# Patient Record
Sex: FEMALE | Race: WHITE | Employment: FULL TIME | URBAN - NONMETROPOLITAN AREA
[De-identification: names, ages, dates, MRNs, and addresses within clinical notes are randomized per-mention and may not be internally consistent; named-entity substitution may affect disease eponyms.]

---

## 2017-01-19 ENCOUNTER — TELEPHONE (OUTPATIENT)
Dept: FAMILY MEDICINE CLINIC | Age: 32
End: 2017-01-19

## 2017-02-01 ENCOUNTER — OFFICE VISIT (OUTPATIENT)
Dept: FAMILY MEDICINE CLINIC | Age: 32
End: 2017-02-01

## 2017-02-01 VITALS
TEMPERATURE: 98.2 F | HEART RATE: 80 BPM | BODY MASS INDEX: 21.9 KG/M2 | DIASTOLIC BLOOD PRESSURE: 61 MMHG | SYSTOLIC BLOOD PRESSURE: 128 MMHG | WEIGHT: 116 LBS | HEIGHT: 61 IN | RESPIRATION RATE: 12 BRPM

## 2017-02-01 DIAGNOSIS — J45.50 UNCOMPLICATED SEVERE PERSISTENT ASTHMA: Primary | ICD-10-CM

## 2017-02-01 DIAGNOSIS — F41.1 GAD (GENERALIZED ANXIETY DISORDER): ICD-10-CM

## 2017-02-01 PROCEDURE — 99214 OFFICE O/P EST MOD 30 MIN: CPT | Performed by: NURSE PRACTITIONER

## 2017-02-01 RX ORDER — BUDESONIDE AND FORMOTEROL FUMARATE DIHYDRATE 160; 4.5 UG/1; UG/1
2 AEROSOL RESPIRATORY (INHALATION) 2 TIMES DAILY
Qty: 1 INHALER | Refills: 3 | Status: SHIPPED | OUTPATIENT
Start: 2017-02-01 | End: 2017-07-13 | Stop reason: SDUPTHER

## 2017-02-01 RX ORDER — ESCITALOPRAM OXALATE 10 MG/1
10 TABLET ORAL DAILY
Qty: 90 TABLET | Refills: 3 | Status: SHIPPED | OUTPATIENT
Start: 2017-02-01 | End: 2017-10-24 | Stop reason: SDUPTHER

## 2017-02-16 ENCOUNTER — TELEPHONE (OUTPATIENT)
Dept: FAMILY MEDICINE CLINIC | Age: 32
End: 2017-02-16

## 2017-02-17 ENCOUNTER — OFFICE VISIT (OUTPATIENT)
Dept: PULMONOLOGY | Age: 32
End: 2017-02-17

## 2017-02-17 VITALS
HEIGHT: 61 IN | TEMPERATURE: 97.6 F | WEIGHT: 116.2 LBS | DIASTOLIC BLOOD PRESSURE: 76 MMHG | SYSTOLIC BLOOD PRESSURE: 110 MMHG | BODY MASS INDEX: 21.94 KG/M2 | HEART RATE: 87 BPM | OXYGEN SATURATION: 98 %

## 2017-02-17 DIAGNOSIS — K21.9 GASTROESOPHAGEAL REFLUX DISEASE WITHOUT ESOPHAGITIS: ICD-10-CM

## 2017-02-17 DIAGNOSIS — F17.218 CIGARETTE NICOTINE DEPENDENCE WITH OTHER NICOTINE-INDUCED DISORDER: ICD-10-CM

## 2017-02-17 DIAGNOSIS — R05.9 COUGH: Primary | ICD-10-CM

## 2017-02-17 DIAGNOSIS — J44.9 CHRONIC OBSTRUCTIVE PULMONARY DISEASE, UNSPECIFIED COPD TYPE (HCC): ICD-10-CM

## 2017-02-17 PROCEDURE — 99204 OFFICE O/P NEW MOD 45 MIN: CPT | Performed by: INTERNAL MEDICINE

## 2017-02-17 RX ORDER — PANTOPRAZOLE SODIUM 40 MG/1
40 TABLET, DELAYED RELEASE ORAL DAILY
Qty: 30 TABLET | Refills: 3 | Status: SHIPPED | OUTPATIENT
Start: 2017-02-17 | End: 2017-07-13 | Stop reason: SDUPTHER

## 2017-02-17 RX ORDER — VARENICLINE TARTRATE 0.5 MG/1
0.5 TABLET, FILM COATED ORAL 2 TIMES DAILY
Qty: 60 TABLET | Refills: 3 | Status: SHIPPED | OUTPATIENT
Start: 2017-02-17 | End: 2017-08-15 | Stop reason: ALTCHOICE

## 2017-02-17 ASSESSMENT — ENCOUNTER SYMPTOMS
CHOKING: 0
WHEEZING: 0
SHORTNESS OF BREATH: 1
RHINORRHEA: 0
CONSTIPATION: 0
PHOTOPHOBIA: 0
VOMITING: 0
FACIAL SWELLING: 0
BACK PAIN: 0
VOICE CHANGE: 0
NAUSEA: 0
SINUS PRESSURE: 0
STRIDOR: 0
ABDOMINAL DISTENTION: 0
APNEA: 0
CHEST TIGHTNESS: 0
ABDOMINAL PAIN: 0
COUGH: 1
BLOOD IN STOOL: 0

## 2017-03-23 LAB
IGE: 111 KU/L
Lab: NORMAL

## 2017-05-12 ENCOUNTER — OFFICE VISIT (OUTPATIENT)
Dept: PULMONOLOGY | Age: 32
End: 2017-05-12

## 2017-05-12 VITALS
OXYGEN SATURATION: 97 % | WEIGHT: 113 LBS | TEMPERATURE: 98.2 F | SYSTOLIC BLOOD PRESSURE: 121 MMHG | HEIGHT: 60 IN | HEART RATE: 90 BPM | DIASTOLIC BLOOD PRESSURE: 76 MMHG | BODY MASS INDEX: 22.19 KG/M2 | RESPIRATION RATE: 12 BRPM

## 2017-05-12 DIAGNOSIS — J45.20 MILD INTERMITTENT ASTHMA WITHOUT COMPLICATION: Primary | ICD-10-CM

## 2017-05-12 PROBLEM — J45.909 ASTHMA: Status: ACTIVE | Noted: 2017-05-12

## 2017-05-12 PROCEDURE — 99213 OFFICE O/P EST LOW 20 MIN: CPT | Performed by: INTERNAL MEDICINE

## 2017-05-12 RX ORDER — ALBUTEROL SULFATE 90 UG/1
2 AEROSOL, METERED RESPIRATORY (INHALATION) EVERY 6 HOURS PRN
Qty: 1 INHALER | Refills: 3 | Status: SHIPPED | OUTPATIENT
Start: 2017-05-12 | End: 2018-04-30 | Stop reason: SDUPTHER

## 2017-07-13 DIAGNOSIS — J45.50 UNCOMPLICATED SEVERE PERSISTENT ASTHMA: ICD-10-CM

## 2017-07-13 DIAGNOSIS — R05.9 COUGH: ICD-10-CM

## 2017-07-13 RX ORDER — BUDESONIDE AND FORMOTEROL FUMARATE DIHYDRATE 160; 4.5 UG/1; UG/1
2 AEROSOL RESPIRATORY (INHALATION) 2 TIMES DAILY
Qty: 1 INHALER | Refills: 3 | Status: SHIPPED | OUTPATIENT
Start: 2017-07-13 | End: 2017-10-30 | Stop reason: SDUPTHER

## 2017-07-13 RX ORDER — PANTOPRAZOLE SODIUM 40 MG/1
40 TABLET, DELAYED RELEASE ORAL DAILY
Qty: 30 TABLET | Refills: 3 | Status: SHIPPED | OUTPATIENT
Start: 2017-07-13 | End: 2017-10-24 | Stop reason: SDUPTHER

## 2017-08-15 ENCOUNTER — OFFICE VISIT (OUTPATIENT)
Dept: FAMILY MEDICINE CLINIC | Age: 32
End: 2017-08-15
Payer: COMMERCIAL

## 2017-08-15 VITALS
SYSTOLIC BLOOD PRESSURE: 118 MMHG | DIASTOLIC BLOOD PRESSURE: 78 MMHG | HEART RATE: 68 BPM | TEMPERATURE: 98.7 F | HEIGHT: 61 IN | WEIGHT: 115.8 LBS | RESPIRATION RATE: 16 BRPM | BODY MASS INDEX: 21.86 KG/M2

## 2017-08-15 DIAGNOSIS — R10.10 UPPER ABDOMINAL PAIN: Primary | ICD-10-CM

## 2017-08-15 DIAGNOSIS — R19.00 ABDOMINAL WALL BULGE: ICD-10-CM

## 2017-08-15 DIAGNOSIS — R11.15 NON-INTRACTABLE CYCLICAL VOMITING WITH NAUSEA: ICD-10-CM

## 2017-08-15 PROCEDURE — 99214 OFFICE O/P EST MOD 30 MIN: CPT | Performed by: NURSE PRACTITIONER

## 2017-08-15 RX ORDER — ONDANSETRON 4 MG/1
4 TABLET, FILM COATED ORAL EVERY 8 HOURS PRN
Qty: 30 TABLET | Refills: 0 | Status: SHIPPED | OUTPATIENT
Start: 2017-08-15 | End: 2017-08-31 | Stop reason: SDUPTHER

## 2017-08-21 ENCOUNTER — TELEPHONE (OUTPATIENT)
Dept: FAMILY MEDICINE CLINIC | Age: 32
End: 2017-08-21

## 2017-08-21 ENCOUNTER — HOSPITAL ENCOUNTER (OUTPATIENT)
Dept: CT IMAGING | Age: 32
Discharge: HOME | End: 2017-08-21
Payer: COMMERCIAL

## 2017-08-21 DIAGNOSIS — R10.13 EPIGASTRIC ABDOMINAL PAIN: Primary | ICD-10-CM

## 2017-08-21 DIAGNOSIS — R11.15 NON-INTRACTABLE CYCLICAL VOMITING WITH NAUSEA: ICD-10-CM

## 2017-08-21 DIAGNOSIS — R10.10 UPPER ABDOMINAL PAIN: ICD-10-CM

## 2017-08-21 DIAGNOSIS — R19.00 ABDOMINAL WALL BULGE: ICD-10-CM

## 2017-08-21 DIAGNOSIS — K21.9 GASTROESOPHAGEAL REFLUX DISEASE, ESOPHAGITIS PRESENCE NOT SPECIFIED: ICD-10-CM

## 2017-08-21 PROCEDURE — 6360000004 HC RX CONTRAST MEDICATION: Performed by: NURSE PRACTITIONER

## 2017-08-21 PROCEDURE — 74177 CT ABD & PELVIS W/CONTRAST: CPT

## 2017-08-21 RX ADMIN — IOPAMIDOL 85 ML: 755 INJECTION, SOLUTION INTRAVENOUS at 07:29

## 2017-08-21 RX ADMIN — IOHEXOL 50 ML: 240 INJECTION, SOLUTION INTRATHECAL; INTRAVASCULAR; INTRAVENOUS; ORAL at 07:30

## 2017-08-31 DIAGNOSIS — R11.15 NON-INTRACTABLE CYCLICAL VOMITING WITH NAUSEA: ICD-10-CM

## 2017-08-31 RX ORDER — ONDANSETRON 4 MG/1
4 TABLET, FILM COATED ORAL EVERY 8 HOURS PRN
Qty: 30 TABLET | Refills: 0 | Status: SHIPPED | OUTPATIENT
Start: 2017-08-31 | End: 2019-03-26 | Stop reason: ALTCHOICE

## 2017-09-18 ENCOUNTER — OFFICE VISIT (OUTPATIENT)
Dept: FAMILY MEDICINE CLINIC | Age: 32
End: 2017-09-18
Payer: COMMERCIAL

## 2017-09-18 ENCOUNTER — TELEPHONE (OUTPATIENT)
Dept: FAMILY MEDICINE CLINIC | Age: 32
End: 2017-09-18

## 2017-09-18 VITALS
WEIGHT: 114 LBS | TEMPERATURE: 99.6 F | DIASTOLIC BLOOD PRESSURE: 82 MMHG | HEIGHT: 60 IN | SYSTOLIC BLOOD PRESSURE: 110 MMHG | BODY MASS INDEX: 22.38 KG/M2 | HEART RATE: 122 BPM

## 2017-09-18 DIAGNOSIS — R50.9 FEVER IN ADULT: Primary | ICD-10-CM

## 2017-09-18 DIAGNOSIS — R52 BODY ACHES: ICD-10-CM

## 2017-09-18 DIAGNOSIS — J40 BRONCHITIS: ICD-10-CM

## 2017-09-18 LAB
INFLUENZA A ANTIBODY: NORMAL
INFLUENZA B ANTIBODY: NORMAL

## 2017-09-18 PROCEDURE — 99213 OFFICE O/P EST LOW 20 MIN: CPT | Performed by: NURSE PRACTITIONER

## 2017-09-18 PROCEDURE — 87804 INFLUENZA ASSAY W/OPTIC: CPT | Performed by: NURSE PRACTITIONER

## 2017-09-18 RX ORDER — DOXYCYCLINE HYCLATE 100 MG
100 TABLET ORAL 2 TIMES DAILY
Qty: 20 TABLET | Refills: 0 | Status: SHIPPED | OUTPATIENT
Start: 2017-09-18 | End: 2017-09-28

## 2017-09-19 ENCOUNTER — TELEPHONE (OUTPATIENT)
Dept: FAMILY MEDICINE CLINIC | Age: 32
End: 2017-09-19

## 2017-09-21 ENCOUNTER — TELEPHONE (OUTPATIENT)
Dept: FAMILY MEDICINE CLINIC | Age: 32
End: 2017-09-21

## 2017-09-21 NOTE — TELEPHONE ENCOUNTER
Patient called and is not feeling much better. Very tired, temp low grade , which is better, still coughing but not coughing anything up. Any instructions?

## 2017-09-22 ENCOUNTER — OFFICE VISIT (OUTPATIENT)
Dept: FAMILY MEDICINE CLINIC | Age: 32
End: 2017-09-22
Payer: COMMERCIAL

## 2017-09-22 VITALS
TEMPERATURE: 98.2 F | SYSTOLIC BLOOD PRESSURE: 106 MMHG | BODY MASS INDEX: 20.43 KG/M2 | HEART RATE: 80 BPM | DIASTOLIC BLOOD PRESSURE: 74 MMHG | WEIGHT: 111 LBS | RESPIRATION RATE: 14 BRPM | HEIGHT: 62 IN

## 2017-09-22 DIAGNOSIS — F41.1 GAD (GENERALIZED ANXIETY DISORDER): ICD-10-CM

## 2017-09-22 DIAGNOSIS — J30.1 SEASONAL ALLERGIC RHINITIS DUE TO POLLEN: ICD-10-CM

## 2017-09-22 DIAGNOSIS — J18.9 LINGULAR PNEUMONIA: Primary | ICD-10-CM

## 2017-09-22 PROCEDURE — 99214 OFFICE O/P EST MOD 30 MIN: CPT | Performed by: NURSE PRACTITIONER

## 2017-09-22 RX ORDER — GUAIFENESIN AND CODEINE PHOSPHATE 100; 10 MG/5ML; MG/5ML
5 SOLUTION ORAL 4 TIMES DAILY PRN
Qty: 120 ML | Refills: 0 | Status: SHIPPED | OUTPATIENT
Start: 2017-09-22 | End: 2017-09-29

## 2017-09-22 RX ORDER — FEXOFENADINE HCL AND PSEUDOEPHEDRINE HCI 180; 240 MG/1; MG/1
1 TABLET, EXTENDED RELEASE ORAL DAILY
Qty: 30 TABLET | Refills: 5 | Status: SHIPPED | OUTPATIENT
Start: 2017-09-22 | End: 2018-04-30 | Stop reason: SDUPTHER

## 2017-09-26 ENCOUNTER — TELEPHONE (OUTPATIENT)
Dept: FAMILY MEDICINE CLINIC | Age: 32
End: 2017-09-26

## 2017-09-26 DIAGNOSIS — M62.838 NECK MUSCLE SPASM: Primary | ICD-10-CM

## 2017-09-26 RX ORDER — DIAZEPAM 2 MG/1
2 TABLET ORAL EVERY 8 HOURS PRN
Qty: 21 TABLET | Refills: 0 | Status: SHIPPED | OUTPATIENT
Start: 2017-09-26 | End: 2017-10-24 | Stop reason: ALTCHOICE

## 2017-09-26 RX ORDER — TIZANIDINE 4 MG/1
4 TABLET ORAL EVERY 8 HOURS PRN
Qty: 30 TABLET | Refills: 3 | Status: SHIPPED | OUTPATIENT
Start: 2017-09-26 | End: 2017-10-24 | Stop reason: ALTCHOICE

## 2017-10-18 ENCOUNTER — PATIENT MESSAGE (OUTPATIENT)
Dept: FAMILY MEDICINE CLINIC | Age: 32
End: 2017-10-18

## 2017-10-24 ENCOUNTER — OFFICE VISIT (OUTPATIENT)
Dept: FAMILY MEDICINE CLINIC | Age: 32
End: 2017-10-24
Payer: COMMERCIAL

## 2017-10-24 VITALS
RESPIRATION RATE: 12 BRPM | SYSTOLIC BLOOD PRESSURE: 104 MMHG | HEART RATE: 88 BPM | WEIGHT: 112 LBS | HEIGHT: 61 IN | TEMPERATURE: 98.6 F | DIASTOLIC BLOOD PRESSURE: 72 MMHG | BODY MASS INDEX: 21.14 KG/M2

## 2017-10-24 DIAGNOSIS — K21.9 GASTROESOPHAGEAL REFLUX DISEASE, ESOPHAGITIS PRESENCE NOT SPECIFIED: Primary | ICD-10-CM

## 2017-10-24 DIAGNOSIS — F41.1 GAD (GENERALIZED ANXIETY DISORDER): ICD-10-CM

## 2017-10-24 DIAGNOSIS — Z23 NEED FOR INFLUENZA VACCINATION: ICD-10-CM

## 2017-10-24 DIAGNOSIS — Z23 NEED FOR PNEUMOCOCCAL VACCINATION: ICD-10-CM

## 2017-10-24 PROCEDURE — 99214 OFFICE O/P EST MOD 30 MIN: CPT | Performed by: NURSE PRACTITIONER

## 2017-10-24 PROCEDURE — G8420 CALC BMI NORM PARAMETERS: HCPCS | Performed by: NURSE PRACTITIONER

## 2017-10-24 PROCEDURE — 90688 IIV4 VACCINE SPLT 0.5 ML IM: CPT | Performed by: NURSE PRACTITIONER

## 2017-10-24 PROCEDURE — 90471 IMMUNIZATION ADMIN: CPT | Performed by: NURSE PRACTITIONER

## 2017-10-24 PROCEDURE — G8484 FLU IMMUNIZE NO ADMIN: HCPCS | Performed by: NURSE PRACTITIONER

## 2017-10-24 PROCEDURE — 90472 IMMUNIZATION ADMIN EACH ADD: CPT | Performed by: NURSE PRACTITIONER

## 2017-10-24 PROCEDURE — 90732 PPSV23 VACC 2 YRS+ SUBQ/IM: CPT | Performed by: NURSE PRACTITIONER

## 2017-10-24 PROCEDURE — 1036F TOBACCO NON-USER: CPT | Performed by: NURSE PRACTITIONER

## 2017-10-24 PROCEDURE — G8427 DOCREV CUR MEDS BY ELIG CLIN: HCPCS | Performed by: NURSE PRACTITIONER

## 2017-10-24 RX ORDER — ESCITALOPRAM OXALATE 10 MG/1
10 TABLET ORAL DAILY
Qty: 90 TABLET | Refills: 3 | Status: SHIPPED | OUTPATIENT
Start: 2017-10-24 | End: 2018-04-30 | Stop reason: SDUPTHER

## 2017-10-24 RX ORDER — PANTOPRAZOLE SODIUM 40 MG/1
40 TABLET, DELAYED RELEASE ORAL DAILY
Qty: 30 TABLET | Refills: 3 | Status: SHIPPED | OUTPATIENT
Start: 2017-10-24 | End: 2017-10-24 | Stop reason: ALTCHOICE

## 2017-10-24 RX ORDER — PANTOPRAZOLE SODIUM 40 MG/1
40 TABLET, DELAYED RELEASE ORAL DAILY
Qty: 90 TABLET | Refills: 3 | Status: SHIPPED | OUTPATIENT
Start: 2017-10-24 | End: 2018-04-30 | Stop reason: SDUPTHER

## 2017-10-24 RX ORDER — CLONAZEPAM 0.5 MG/1
0.5 TABLET ORAL 2 TIMES DAILY PRN
Qty: 60 TABLET | Refills: 0 | Status: SHIPPED | OUTPATIENT
Start: 2017-10-24 | End: 2018-03-14 | Stop reason: SDUPTHER

## 2017-10-24 NOTE — PROGRESS NOTES
After obtaining consent, and per orders of Elly Foster CNP , injection of  Flu Vaccine . 5 ml and Pneumovax 23 .5 ml given in the right deltoid by Christi Ruiz. Patient instructed to report any adverse reaction to me immediately. Most recent Vaccine Information sheet dated 8/7/15 and 4/24/15 given to patient.
Visit Information    Have you changed or started any medications since your last visit including any over-the-counter medicines, vitamins, or herbal medicines? no   Are you having any side effects from any of your medications? -  no  Have you stopped taking any of your medications? Is so, why? -  no    Have you seen any other physician or provider since your last visit? Yes - Records Obtained  Have you had any other diagnostic tests since your last visit? Yes - Records Obtained  Have you been seen in the emergency room and/or had an admission to a hospital since we last saw you? Yes - Records Obtained  Have you had your routine dental cleaning in the past 6 months? yes    Have you activated your [employer] account? If not, what are your barriers? Yes     Patient Care Team:  Kellen Oscar CNP as PCP - General (Family Medicine)    Medical History Review  Past Medical, Family, and Social History     Defer to provider.
abuse or diversion identified. Sebastian Carmen CNP)    DISPOSITION    Return if symptoms worsen or fail to improve. Anna Yousif released without restrictions. PATIENT COUNSELING    Counseling was provided today regarding the following topics: Healthy eating habits, Regular exercise, substance abuse and healthy sleep habits. Anna Yousif received counseling on the following healthy behaviors: tobacco cessation    Patient given educational materials on: See Attached    I have instructed Anna Yousif to complete a self tracking handout on none and instructed them to bring it with them to her next appointment. Barriers to learning and self management: none    Discussed use, benefit, and side effects of prescribed medications. Barriers to medication compliance addressed. All patient questions answered. Pt voiced understanding.        Electronically signed by Sebastian Carmen CNP on 10/24/2017 at 12:01 PM

## 2017-10-30 DIAGNOSIS — J45.50 UNCOMPLICATED SEVERE PERSISTENT ASTHMA: ICD-10-CM

## 2017-10-30 RX ORDER — BUDESONIDE AND FORMOTEROL FUMARATE DIHYDRATE 160; 4.5 UG/1; UG/1
2 AEROSOL RESPIRATORY (INHALATION) 2 TIMES DAILY
Qty: 1 INHALER | Refills: 1 | Status: SHIPPED | OUTPATIENT
Start: 2017-10-30 | End: 2017-12-13 | Stop reason: SDUPTHER

## 2017-12-12 DIAGNOSIS — J44.9 CHRONIC OBSTRUCTIVE PULMONARY DISEASE, UNSPECIFIED COPD TYPE (HCC): ICD-10-CM

## 2017-12-12 RX ORDER — IPRATROPIUM BROMIDE AND ALBUTEROL SULFATE 2.5; .5 MG/3ML; MG/3ML
1 SOLUTION RESPIRATORY (INHALATION) EVERY 4 HOURS PRN
Qty: 360 ML | Refills: 3 | Status: SHIPPED | OUTPATIENT
Start: 2017-12-12 | End: 2018-03-15 | Stop reason: SDUPTHER

## 2017-12-12 NOTE — TELEPHONE ENCOUNTER
From: Devante Wu  Sent: 12/12/2017 10:28 AM EST  Subject: Medication Renewal Request    Lucy Caruso.  Conradocailinamaury would like a refill of the following medications:  ipratropium-albuterol (DUONEB) 0.5-2.5 (3) MG/3ML SOLN nebulizer solution Mikie Ly CNP]    Preferred pharmacy: [address] [place] [address], [address] [place] [address] [phone] - f [phone]    Comment:      Medication renewals requested in this message routed separately:  budesonide-formoterol (SYMBICORT) 160-4.5 MCG/ACT KEVINO Roddy Sharif MD]

## 2017-12-13 DIAGNOSIS — J45.50 UNCOMPLICATED SEVERE PERSISTENT ASTHMA: ICD-10-CM

## 2017-12-14 RX ORDER — BUDESONIDE AND FORMOTEROL FUMARATE DIHYDRATE 160; 4.5 UG/1; UG/1
2 AEROSOL RESPIRATORY (INHALATION) 2 TIMES DAILY
Qty: 1 INHALER | Refills: 1 | Status: SHIPPED | OUTPATIENT
Start: 2017-12-14 | End: 2018-03-15 | Stop reason: SDUPTHER

## 2018-03-14 DIAGNOSIS — F41.1 GAD (GENERALIZED ANXIETY DISORDER): ICD-10-CM

## 2018-03-14 RX ORDER — CLONAZEPAM 0.5 MG/1
0.5 TABLET ORAL 2 TIMES DAILY PRN
Qty: 60 TABLET | Refills: 0 | Status: SHIPPED | OUTPATIENT
Start: 2018-03-14 | End: 2018-09-18 | Stop reason: SDUPTHER

## 2018-03-14 NOTE — TELEPHONE ENCOUNTER
From: Vikas Esqueda  Sent: 3/14/2018 12:00 PM EDT  Subject: Medication Renewal Request    Yeni Patel would like a refill of the following medications:     clonazePAM (KLONOPIN) 0.5 MG tablet Hue Esquivel CNP]    Preferred pharmacy: [address] [place] [address] [phone] - F [phone]    Comment:      Medication renewals requested in this message routed separately:     budesonide-formoterol (SYMBICORT) 160-4.5 MCG/ACT KEVINO Nelly Olguin MD]

## 2018-03-15 DIAGNOSIS — J45.50 UNCOMPLICATED SEVERE PERSISTENT ASTHMA: ICD-10-CM

## 2018-03-15 DIAGNOSIS — R05.9 COUGH: ICD-10-CM

## 2018-03-15 DIAGNOSIS — J44.9 CHRONIC OBSTRUCTIVE PULMONARY DISEASE, UNSPECIFIED COPD TYPE (HCC): ICD-10-CM

## 2018-03-15 RX ORDER — IPRATROPIUM BROMIDE AND ALBUTEROL SULFATE 2.5; .5 MG/3ML; MG/3ML
1 SOLUTION RESPIRATORY (INHALATION) EVERY 4 HOURS PRN
Qty: 360 ML | Refills: 3 | Status: SHIPPED | OUTPATIENT
Start: 2018-03-15 | End: 2019-06-30 | Stop reason: SDUPTHER

## 2018-03-20 RX ORDER — BUDESONIDE AND FORMOTEROL FUMARATE DIHYDRATE 160; 4.5 UG/1; UG/1
2 AEROSOL RESPIRATORY (INHALATION) 2 TIMES DAILY
Qty: 1 INHALER | Refills: 0 | Status: SHIPPED | OUTPATIENT
Start: 2018-03-20 | End: 2018-04-30 | Stop reason: SDUPTHER

## 2018-04-30 DIAGNOSIS — J45.20 MILD INTERMITTENT ASTHMA WITHOUT COMPLICATION: ICD-10-CM

## 2018-04-30 DIAGNOSIS — K21.9 GASTROESOPHAGEAL REFLUX DISEASE, ESOPHAGITIS PRESENCE NOT SPECIFIED: ICD-10-CM

## 2018-04-30 DIAGNOSIS — J30.1 SEASONAL ALLERGIC RHINITIS DUE TO POLLEN: ICD-10-CM

## 2018-04-30 DIAGNOSIS — J45.50 UNCOMPLICATED SEVERE PERSISTENT ASTHMA: ICD-10-CM

## 2018-04-30 DIAGNOSIS — F41.1 GAD (GENERALIZED ANXIETY DISORDER): ICD-10-CM

## 2018-04-30 RX ORDER — BUDESONIDE AND FORMOTEROL FUMARATE DIHYDRATE 160; 4.5 UG/1; UG/1
2 AEROSOL RESPIRATORY (INHALATION) 2 TIMES DAILY
Qty: 1 INHALER | Refills: 0 | Status: SHIPPED | OUTPATIENT
Start: 2018-04-30 | End: 2019-03-26

## 2018-04-30 RX ORDER — ALBUTEROL SULFATE 90 UG/1
2 AEROSOL, METERED RESPIRATORY (INHALATION) EVERY 6 HOURS PRN
Qty: 1 INHALER | Refills: 2 | Status: SHIPPED | OUTPATIENT
Start: 2018-04-30 | End: 2019-07-03 | Stop reason: SDUPTHER

## 2018-04-30 RX ORDER — FEXOFENADINE HCL AND PSEUDOEPHEDRINE HCI 180; 240 MG/1; MG/1
1 TABLET, EXTENDED RELEASE ORAL DAILY
Qty: 30 TABLET | Refills: 5 | Status: SHIPPED | OUTPATIENT
Start: 2018-04-30 | End: 2019-06-30 | Stop reason: SDUPTHER

## 2018-04-30 RX ORDER — ESCITALOPRAM OXALATE 10 MG/1
10 TABLET ORAL DAILY
Qty: 90 TABLET | Refills: 3 | Status: SHIPPED | OUTPATIENT
Start: 2018-04-30 | End: 2019-03-26 | Stop reason: SDUPTHER

## 2018-04-30 RX ORDER — PANTOPRAZOLE SODIUM 40 MG/1
40 TABLET, DELAYED RELEASE ORAL DAILY
Qty: 90 TABLET | Refills: 3 | Status: SHIPPED | OUTPATIENT
Start: 2018-04-30 | End: 2019-03-26 | Stop reason: SDUPTHER

## 2018-06-29 ENCOUNTER — TELEPHONE (OUTPATIENT)
Dept: FAMILY MEDICINE CLINIC | Age: 33
End: 2018-06-29

## 2018-08-01 ENCOUNTER — HOSPITAL ENCOUNTER (OUTPATIENT)
Dept: CT IMAGING | Age: 33
Discharge: HOME | End: 2018-08-01
Payer: COMMERCIAL

## 2018-08-01 DIAGNOSIS — S42.021A CLOSED DISPLACED FRACTURE OF SHAFT OF RIGHT CLAVICLE, INITIAL ENCOUNTER: ICD-10-CM

## 2018-08-01 PROCEDURE — 73200 CT UPPER EXTREMITY W/O DYE: CPT

## 2018-09-10 ENCOUNTER — OFFICE VISIT (OUTPATIENT)
Dept: FAMILY MEDICINE CLINIC | Age: 33
End: 2018-09-10
Payer: COMMERCIAL

## 2018-09-10 VITALS
BODY MASS INDEX: 21.71 KG/M2 | RESPIRATION RATE: 24 BRPM | DIASTOLIC BLOOD PRESSURE: 84 MMHG | WEIGHT: 118 LBS | SYSTOLIC BLOOD PRESSURE: 126 MMHG | HEIGHT: 62 IN | TEMPERATURE: 99 F | HEART RATE: 100 BPM

## 2018-09-10 DIAGNOSIS — J01.90 ACUTE RHINOSINUSITIS: Primary | ICD-10-CM

## 2018-09-10 PROCEDURE — 99213 OFFICE O/P EST LOW 20 MIN: CPT | Performed by: NURSE PRACTITIONER

## 2018-09-10 RX ORDER — FLUCONAZOLE 150 MG/1
150 TABLET ORAL ONCE
Qty: 2 TABLET | Refills: 0 | Status: SHIPPED | OUTPATIENT
Start: 2018-09-10 | End: 2018-09-10

## 2018-09-10 RX ORDER — AMOXICILLIN AND CLAVULANATE POTASSIUM 875; 125 MG/1; MG/1
1 TABLET, FILM COATED ORAL 2 TIMES DAILY
Qty: 20 TABLET | Refills: 0 | Status: SHIPPED | OUTPATIENT
Start: 2018-09-10 | End: 2018-09-20

## 2018-09-10 RX ORDER — FLUTICASONE PROPIONATE 50 MCG
2 SPRAY, SUSPENSION (ML) NASAL DAILY
Qty: 1 BOTTLE | Refills: 3 | Status: SHIPPED | OUTPATIENT
Start: 2018-09-10 | End: 2019-09-11 | Stop reason: ALTCHOICE

## 2018-09-10 ASSESSMENT — PATIENT HEALTH QUESTIONNAIRE - PHQ9
2. FEELING DOWN, DEPRESSED OR HOPELESS: 0
SUM OF ALL RESPONSES TO PHQ QUESTIONS 1-9: 0
SUM OF ALL RESPONSES TO PHQ9 QUESTIONS 1 & 2: 0
SUM OF ALL RESPONSES TO PHQ QUESTIONS 1-9: 0
1. LITTLE INTEREST OR PLEASURE IN DOING THINGS: 0

## 2018-09-10 NOTE — PROGRESS NOTES
Have you changed or started any medications since your last visit including any over-the-counter medicines, vitamins, or herbal medicines? no   Are you having any side effects from any of your medications? -  no  Have you stopped taking any of your medications? Is so, why? -  no    Have you seen any other physician or provider since your last visit? Yes - Records Obtained  Have you had any other diagnostic tests since your last visit? No  Have you been seen in the emergency room and/or had an admission to a hospital since we last saw you? No  Have you had your routine dental cleaning in the past 6 months? no    Have you activated your [employer] account? If not, what are your barriers? Yes     Patient Care Team:  AMY Tyson CNP as PCP - General (Family Medicine)  AMY Tyson CNP as PCP - [facility] Attributed Provider    Medical History Review  Past Medical, Family, and Social History     Defer to provider.

## 2018-09-10 NOTE — PROGRESS NOTES
01/06/2010   BMI 21.93 kg/m²   General appearance: alert, well appearing, and in no distress. ENT exam reveals - right TM fluid noted, neck has right anterior cervical nodes enlarged, throat normal without erythema or exudate and nasal mucosa congested. CVS exam: normal rate, regular rhythm, normal S1, S2, no murmurs, rubs, clicks or gallops. Chest:clear to auscultation, no wheezes, rales or rhonchi, symmetric air entry. Abdominal exam: soft, nontender, nondistended, no masses or organomegaly. Extremities:  No clubbing, cyanosis or edema  Skin exam - normal coloration and turgor, no rashes, no suspicious skin lesions noted. Psych -  Affect appropriate. Thought process is normal without evidence of depression or psychosis. Good insight and appropriae interaction. Cognition and memory appear to be intact. ASSESSMENT & PLAN  Italia Merrill was seen today for sinus problem, otalgia and pharyngitis. Diagnoses and all orders for this visit:    Acute rhinosinusitis  -     amoxicillin-clavulanate (AUGMENTIN) 875-125 MG per tablet; Take 1 tablet by mouth 2 times daily for 10 days  -     fluticasone (FLONASE) 50 MCG/ACT nasal spray; 2 sprays by Nasal route daily    Other orders  -     fluconazole (DIFLUCAN) 150 MG tablet; Take 1 tablet by mouth once for 1 dose And take 1 tablet by mouth once again in 10 days      - con't Allegra D   - add Flonase, discussed administration technique  - diflucan for vaginal yeast prevention    Return if symptoms worsen or fail to improve.     -Patient advised to call immediately or go to ER if any worsening of symptoms  -Patient counseled on conservative care including fluids, rest and OTC meds    Italia Merrill received counseling on the following healthy behaviors: medication adherence  Reviewed prior labs and health maintenance. Continue current medications, diet and exercise. Discussed use, benefit, and side effects of prescribed medications.  Barriers to medication compliance addressed. Patient given educational materials - see patient instructions. All patient questions answered. Patient voiced understanding. I have reviewed this patient's history, habits, and medication list and have updated the chart where appropriate.

## 2018-09-11 ENCOUNTER — TELEPHONE (OUTPATIENT)
Dept: FAMILY MEDICINE CLINIC | Age: 33
End: 2018-09-11

## 2018-09-11 DIAGNOSIS — B37.0 THRUSH: Primary | ICD-10-CM

## 2018-09-18 DIAGNOSIS — F41.1 GAD (GENERALIZED ANXIETY DISORDER): ICD-10-CM

## 2018-09-18 RX ORDER — CLONAZEPAM 0.5 MG/1
0.5 TABLET ORAL 2 TIMES DAILY PRN
Qty: 60 TABLET | Refills: 0 | Status: SHIPPED | OUTPATIENT
Start: 2018-09-18 | End: 2018-12-03 | Stop reason: SDUPTHER

## 2018-09-18 NOTE — TELEPHONE ENCOUNTER
From: Sultana Lugo  Sent: 9/18/2018 10:20 AM EDT  Subject: Medication Renewal Request    Taylor Powell.  Jesus would like a refill of the following medications:     clonazePAM (KLONOPIN) 0.5 MG tablet AMY Robb - CNP]    Preferred pharmacy: [address] [place] [address] [phone] - F [phone]    Comment:

## 2018-11-19 ENCOUNTER — HOSPITAL ENCOUNTER (OUTPATIENT)
Dept: CT IMAGING | Age: 33
Discharge: HOME | End: 2018-11-19
Payer: COMMERCIAL

## 2018-12-03 DIAGNOSIS — F41.1 GAD (GENERALIZED ANXIETY DISORDER): ICD-10-CM

## 2018-12-04 RX ORDER — CLONAZEPAM 0.5 MG/1
0.5 TABLET ORAL 2 TIMES DAILY PRN
Qty: 60 TABLET | Refills: 0 | Status: SHIPPED | OUTPATIENT
Start: 2018-12-04 | End: 2019-02-07 | Stop reason: SDUPTHER

## 2019-02-07 DIAGNOSIS — F41.1 GAD (GENERALIZED ANXIETY DISORDER): ICD-10-CM

## 2019-02-07 RX ORDER — CLONAZEPAM 0.5 MG/1
0.5 TABLET ORAL 2 TIMES DAILY PRN
Qty: 60 TABLET | Refills: 0 | Status: SHIPPED | OUTPATIENT
Start: 2019-02-07 | End: 2019-02-26 | Stop reason: SDUPTHER

## 2019-02-22 ENCOUNTER — TELEPHONE (OUTPATIENT)
Dept: FAMILY MEDICINE CLINIC | Age: 34
End: 2019-02-22

## 2019-02-26 DIAGNOSIS — F41.1 GAD (GENERALIZED ANXIETY DISORDER): ICD-10-CM

## 2019-02-26 RX ORDER — CLONAZEPAM 0.5 MG/1
0.5 TABLET ORAL 2 TIMES DAILY PRN
Qty: 60 TABLET | Refills: 0 | OUTPATIENT
Start: 2019-02-26 | End: 2019-04-27

## 2019-02-26 RX ORDER — CLONAZEPAM 0.5 MG/1
1 TABLET ORAL 2 TIMES DAILY PRN
Qty: 120 TABLET | Refills: 0 | Status: SHIPPED | OUTPATIENT
Start: 2019-02-26 | End: 2019-03-26 | Stop reason: ALTCHOICE

## 2019-03-26 ENCOUNTER — OFFICE VISIT (OUTPATIENT)
Dept: FAMILY MEDICINE CLINIC | Age: 34
End: 2019-03-26
Payer: COMMERCIAL

## 2019-03-26 VITALS
DIASTOLIC BLOOD PRESSURE: 74 MMHG | HEIGHT: 61 IN | TEMPERATURE: 98.3 F | RESPIRATION RATE: 16 BRPM | HEART RATE: 80 BPM | BODY MASS INDEX: 22.13 KG/M2 | WEIGHT: 117.2 LBS | SYSTOLIC BLOOD PRESSURE: 116 MMHG

## 2019-03-26 DIAGNOSIS — A60.04 HERPES SIMPLEX VULVOVAGINITIS: Primary | ICD-10-CM

## 2019-03-26 DIAGNOSIS — F41.1 GAD (GENERALIZED ANXIETY DISORDER): ICD-10-CM

## 2019-03-26 DIAGNOSIS — F43.21 GRIEF: ICD-10-CM

## 2019-03-26 DIAGNOSIS — K21.9 GASTROESOPHAGEAL REFLUX DISEASE, ESOPHAGITIS PRESENCE NOT SPECIFIED: ICD-10-CM

## 2019-03-26 PROCEDURE — 99214 OFFICE O/P EST MOD 30 MIN: CPT | Performed by: NURSE PRACTITIONER

## 2019-03-26 RX ORDER — VALACYCLOVIR HYDROCHLORIDE 1 G/1
1000 TABLET, FILM COATED ORAL DAILY
COMMUNITY
End: 2019-03-26 | Stop reason: SDUPTHER

## 2019-03-26 RX ORDER — VALACYCLOVIR HYDROCHLORIDE 1 G/1
1000 TABLET, FILM COATED ORAL DAILY
Qty: 90 TABLET | Refills: 1 | Status: SHIPPED | OUTPATIENT
Start: 2019-03-26 | End: 2019-10-16 | Stop reason: SDUPTHER

## 2019-03-26 RX ORDER — PANTOPRAZOLE SODIUM 40 MG/1
40 TABLET, DELAYED RELEASE ORAL DAILY
Qty: 90 TABLET | Refills: 3 | Status: SHIPPED | OUTPATIENT
Start: 2019-03-26 | End: 2019-07-25 | Stop reason: SDUPTHER

## 2019-03-26 RX ORDER — CLONAZEPAM 1 MG/1
1 TABLET ORAL 2 TIMES DAILY PRN
Qty: 60 TABLET | Refills: 1 | Status: SHIPPED | OUTPATIENT
Start: 2019-03-26 | End: 2019-06-10 | Stop reason: SDUPTHER

## 2019-03-26 RX ORDER — ESCITALOPRAM OXALATE 20 MG/1
20 TABLET ORAL DAILY
Qty: 90 TABLET | Refills: 1 | Status: SHIPPED | OUTPATIENT
Start: 2019-03-26 | End: 2019-09-11 | Stop reason: SDUPTHER

## 2019-03-26 ASSESSMENT — PATIENT HEALTH QUESTIONNAIRE - PHQ9: DEPRESSION UNABLE TO ASSESS: URGENT/EMERGENT SITUATION

## 2019-05-08 ENCOUNTER — PATIENT MESSAGE (OUTPATIENT)
Dept: FAMILY MEDICINE CLINIC | Age: 34
End: 2019-05-08

## 2019-05-08 NOTE — TELEPHONE ENCOUNTER
From: Ramona Cadena  To: Briseida Vigil APRN - CNP  Sent: 5/8/2019 10:48 AM EDT  Subject: Prescription Question    I take advil cold and sinus behind the counter and allegra D and my drug test for my employer read positive because of the psedoepherine could you fax my employer stating those medications could make that positive if taken daily there fax number is [phone]    Thank you   [facility]

## 2019-05-08 NOTE — LETTER
[address]  [address] [address]  Phone: [phone]  Fax: [phone] [facility], AMY - CNP        May 8, 2019     Patient: Sandra Duckworth   YOB: 1985   Date of Visit: 5/8/2019       To Whom It May Concern: It is my medical opinion that Shelby Murphy does take OTC decongestants (pseudoephedrine) to help with her seasonal allergies which can show up in a urine drug screen as amphetamines. If you have any questions or concerns, please don't hesitate to call.     Sincerely,        Tanvir Lara, AMY - CNP

## 2019-06-10 ENCOUNTER — TELEPHONE (OUTPATIENT)
Dept: FAMILY MEDICINE CLINIC | Age: 34
End: 2019-06-10

## 2019-06-10 ENCOUNTER — OFFICE VISIT (OUTPATIENT)
Dept: FAMILY MEDICINE CLINIC | Age: 34
End: 2019-06-10
Payer: COMMERCIAL

## 2019-06-10 ENCOUNTER — NURSE ONLY (OUTPATIENT)
Dept: LAB | Age: 34
End: 2019-06-10

## 2019-06-10 VITALS
SYSTOLIC BLOOD PRESSURE: 118 MMHG | RESPIRATION RATE: 16 BRPM | DIASTOLIC BLOOD PRESSURE: 88 MMHG | BODY MASS INDEX: 20.24 KG/M2 | WEIGHT: 110 LBS | HEIGHT: 62 IN | HEART RATE: 84 BPM | TEMPERATURE: 98.7 F

## 2019-06-10 DIAGNOSIS — K21.9 GASTROESOPHAGEAL REFLUX DISEASE, ESOPHAGITIS PRESENCE NOT SPECIFIED: ICD-10-CM

## 2019-06-10 DIAGNOSIS — R10.10 UPPER ABDOMINAL PAIN: ICD-10-CM

## 2019-06-10 DIAGNOSIS — F41.1 GAD (GENERALIZED ANXIETY DISORDER): Primary | ICD-10-CM

## 2019-06-10 LAB
ALBUMIN SERPL-MCNC: 4.7 G/DL (ref 3.5–5.1)
ALP BLD-CCNC: 67 U/L (ref 38–126)
ALT SERPL-CCNC: 14 U/L (ref 11–66)
AMYLASE: 39 U/L (ref 20–104)
ANION GAP SERPL CALCULATED.3IONS-SCNC: 14 MEQ/L (ref 8–16)
AST SERPL-CCNC: 22 U/L (ref 5–40)
BILIRUB SERPL-MCNC: 0.2 MG/DL (ref 0.3–1.2)
BUN BLDV-MCNC: 7 MG/DL (ref 7–22)
CALCIUM SERPL-MCNC: 9.3 MG/DL (ref 8.5–10.5)
CHLORIDE BLD-SCNC: 101 MEQ/L (ref 98–111)
CO2: 22 MEQ/L (ref 23–33)
CREAT SERPL-MCNC: 0.5 MG/DL (ref 0.4–1.2)
GFR SERPL CREATININE-BSD FRML MDRD: > 90 ML/MIN/1.73M2
GLUCOSE BLD-MCNC: 78 MG/DL (ref 70–108)
LIPASE: 27.2 U/L (ref 5.6–51.3)
POTASSIUM SERPL-SCNC: 4.6 MEQ/L (ref 3.5–5.2)
SODIUM BLD-SCNC: 137 MEQ/L (ref 135–145)
TOTAL PROTEIN: 7.4 G/DL (ref 6.1–8)

## 2019-06-10 PROCEDURE — 99214 OFFICE O/P EST MOD 30 MIN: CPT | Performed by: NURSE PRACTITIONER

## 2019-06-10 RX ORDER — CLONAZEPAM 1 MG/1
1 TABLET ORAL 2 TIMES DAILY PRN
Qty: 60 TABLET | Refills: 2 | Status: SHIPPED | OUTPATIENT
Start: 2019-06-10 | End: 2019-09-11 | Stop reason: SDUPTHER

## 2019-06-10 ASSESSMENT — PATIENT HEALTH QUESTIONNAIRE - PHQ9
SUM OF ALL RESPONSES TO PHQ QUESTIONS 1-9: 0
1. LITTLE INTEREST OR PLEASURE IN DOING THINGS: 0
2. FEELING DOWN, DEPRESSED OR HOPELESS: 0
SUM OF ALL RESPONSES TO PHQ QUESTIONS 1-9: 0
SUM OF ALL RESPONSES TO PHQ9 QUESTIONS 1 & 2: 0

## 2019-06-10 NOTE — TELEPHONE ENCOUNTER
----- Message from AMY Edwards CNP sent at 6/10/2019  1:22 PM EDT -----  Let Jam Seals know her kidney and liver function is normal and her pancreatic enzymes are also normal.

## 2019-06-10 NOTE — PROGRESS NOTES
Visit Information    Have you changed or started any medications since your last visit including any over-the-counter medicines, vitamins, or herbal medicines? no   Are you having any side effects from any of your medications? -  no  Have you stopped taking any of your medications? Is so, why? -  no    Have you seen any other physician or provider since your last visit? No  Have you had any other diagnostic tests since your last visit? No  Have you been seen in the emergency room and/or had an admission to a hospital since we last saw you? No  Have you had your routine dental cleaning in the past 6 months? yes - routine    Have you activated your [employer] account? If not, what are your barriers?  Yes     Patient Care Team:  AMY Valenzuela CNP as PCP - General (Family Medicine)  AMY Valenzuela CNP as PCP - [facility] Provider    Medical History Review  Past Medical, Family, and Social History reviewed and does contribute to the patient presenting condition    Health Maintenance   Topic Date Due    Varicella Vaccine (1 of 2 - 13+ 2-dose series) 07/24/1997    HIV screen  07/24/1999    Cervical cancer screen  06/21/2019    Flu vaccine (Season Ended) 09/01/2019    DTaP/Tdap/Td vaccine (2 - Td) 01/01/2022    Pneumococcal 0-64 years Vaccine  Completed

## 2019-06-10 NOTE — PROGRESS NOTES
Chief Complaint   Patient presents with    Follow-up     Pt presents for a 2 month f/u for anxiety. Pt states she is doing about the same. She has been going to group therapy once a week and has seen some improvement.  Abdominal Pain     Pt is c/o epigastric pain, diarrhea, and bloating that started about 2 weeks ago. She states this occurs after she eats greasy foods. She believes it may be rel;ated to her gallbladder as she has an extensice FHx of gallbladder removal.        History obtained from chart review and the patient. SUBJECTIVE:  Hodan Daniel is a 28 y.o. female that presents today for follow up anxiety     Anxiety     HPI:  Nat Sprague has been doing grief counseling since last visit. She feels like it helps a little. Anxiety is doing a little better. She does feel like the Lexapro increased dose does help. She is taking the klonopin twice a day and it does help. Inciting events or triggers for anxiety - anything and everything   Frequency of anxiety - daily  Panic attacks?  no  Symptoms of panic attacks -  feelings of losing control, psychomotor agitation and shortness of breath  Sleep Disturbances? Yes - has a hard time falling asleep and staying asleep  Impaired concentration? Yes - mind is just racing  Substance abuse? No  Suicidal/Homicidal Ideation? No    Compliant with meds: yes  Med side effects: No    Sees therapist?: no  Family History of Mental Illness? Yes    Abdominal Pain  Has been having epigastric abdominal pain for the past 1-2 weeks. She only gets the pain when she eats greasy fatty food. Pain is sharp and shooting and it does radiate through to her back. She also gets horrible diarrhea, is very liquidy. No melena, no bright red blood in her stools. She is taking Protonix 40 mg daily and she is still complaining of heartburn. She will take TUMS as well.  She has seen Dr. Spring Mixon in the past.    Age/Gender Health Maintenance    Lipid -   Lab Results   Component Value Date    CHOL 139 11/02/2015     Lab Results   Component Value Date    TRIG 132 11/02/2015     Lab Results   Component Value Date    HDL 30 (L) 11/02/2015     No results found for: Deann Manrique [address]  Lab Results   Component Value Date    VLDL 26 11/02/2015     DM Screen - 2015  Colon Cancer Screening - 2012  Lung Cancer Screening (Age 54 to [de-identified] with 30 pack year hx, current smoker or quit within past 15 years) - n/a    Tetanus - needs  Influenza Vaccine - next due 8/16  Pneumonia Vaccine - 65  Zostavax - 50  HPV Vaccine - n/a    Breast Cancer Screening - 50  Cervical Cancer Screening - n/a, has had hysterectomy  Osteoporosis Screening - 71  Chlamydia Screen - n/a    AAA Screening - n/a    Falls screening - n/a    Current Outpatient Medications   Medication Sig Dispense Refill    clonazePAM (KLONOPIN) 1 MG tablet Take 1 tablet by mouth 2 times daily as needed for Anxiety (panic attacks) for up to 60 days.  60 tablet 2    pantoprazole (PROTONIX) 40 MG tablet Take 1 tablet by mouth daily 90 tablet 3    escitalopram (LEXAPRO) 20 MG tablet Take 1 tablet by mouth daily 90 tablet 1    valACYclovir (VALTREX) 1 g tablet Take 1 tablet by mouth daily 90 tablet 1    fluticasone (FLONASE) 50 MCG/ACT nasal spray 2 sprays by Nasal route daily (Patient taking differently: 2 sprays by Nasal route daily as needed ) 1 Bottle 3    fexofenadine-pseudoephedrine (ALLEGRA-D ALLERGY & CONGESTION) 180-240 MG per extended release tablet Take 1 tablet by mouth daily (Patient taking differently: Take 1 tablet by mouth daily as needed ) 30 tablet 5    albuterol sulfate HFA (PROAIR HFA) 108 (90 Base) MCG/ACT inhaler Inhale 2 puffs into the lungs every 6 hours as needed for Wheezing 1 Inhaler 2    ipratropium-albuterol (DUONEB) 0.5-2.5 (3) MG/3ML SOLN nebulizer solution Inhale 3 mLs into the lungs every 4 hours as needed for Shortness of Breath 360 mL 3    Respiratory Therapy Supplies (NEBULIZER/TUBING/MOUTHPIECE) KIT 1 kit by Does not apply route Occupational History    Not on file   Social Needs    Financial resource strain: Not on file    Food insecurity:     Worry: Not on file     Inability: Not on file    Transportation needs:     Medical: Not on file     Non-medical: Not on file   Tobacco Use    Smoking status: Former Smoker     Packs/day: 0.50     Years: 15.00     Pack years: 7.50     Types: Cigarettes     Last attempt to quit: 2016     Years since quittin.4    Smokeless tobacco: Never Used    Tobacco comment: 5 cigarettes per day   Substance and Sexual Activity    Alcohol use: Yes     Comment: occasional    Drug use: No    Sexual activity: Never   Lifestyle    Physical activity:     Days per week: Not on file     Minutes per session: Not on file    Stress: Not on file   Relationships    Social connections:     Talks on phone: Not on file     Gets together: Not on file     Attends Confucianist service: Not on file     Active member of club or organization: Not on file     Attends meetings of clubs or organizations: Not on file     Relationship status: Not on file    Intimate partner violence:     Fear of current or ex partner: Not on file     Emotionally abused: Not on file     Physically abused: Not on file     Forced sexual activity: Not on file   Other Topics Concern    Not on file   Social History Narrative    Not on file       Family History   Problem Relation Age of Onset    Diabetes Mother     High Blood Pressure Mother     Other Mother         Lactic Acidosis    Cancer Father         lymphoma    High Blood Pressure Maternal Grandmother     Heart Disease Maternal Grandfather         CABG    High Blood Pressure Maternal Grandfather     Colon Cancer Maternal Grandfather     Stroke Neg Hx          I have reviewed the patient's past medical history, past surgical history, allergies, medications, social and family history and I have made updates where appropriate.       Review of Systems  Positive responses are spleen without enlargement. Extremities: no cyanosis, clubbing or edema of the lower extremities  Musculoskeletal: No joint swelling or gross deformity   Neuro:  Alert, 5/5 strength globally and symmetrically  Psych: Affect appropriate. Mood normal. Thought process is normal without evidence of depression or psychosis. Good insight and appropriate interaction. Cognition and memory appear to be intact. Skin: warm and dry, no rash  Lymph:  No cervical, auricular or supraclavicular lymph nodes palpated    ASSESSMENT & PLAN  Samina Apodaca was seen today for follow-up and abdominal pain. Diagnoses and all orders for this visit:    JESUSITA (generalized anxiety disorder)  -     clonazePAM (KLONOPIN) 1 MG tablet; Take 1 tablet by mouth 2 times daily as needed for Anxiety (panic attacks) for up to 60 days. Upper abdominal pain  -     US GALLBLADDER RUQ; Future  -     Comprehensive Metabolic Panel; Future  -     Amylase; Future  -     Lipase; Future    Gastroesophageal reflux disease, esophagitis presence not specified       - con't Lexapro 20 mg  - con't Klonopin  - con't grief counseling  - con't PPI  - obtain labs and US gallbladder  - discussed if US normal, she needs to see GI, also consider HIDA scan      Controlled Substances Monitoring:     Periodic Controlled Substance Monitoring: No signs of potential drug abuse or diversion identified. Krystal Laws, APRN - CNP)    DISPOSITION    Return in about 3 months (around 9/10/2019) for JESUSITA. Samina Apodaca released without restrictions. PATIENT COUNSELING    Counseling was provided today regarding the following topics: Healthy eating habits, Regular exercise, substance abuse and healthy sleep habits.     Samina Apodaca received counseling on the following healthy behaviors: tobacco cessation    Patient given educational materials on: See Attached    I have instructed Samina Apodaca to complete a self tracking handout on none and instructed them to bring it with them to her next appointment. Barriers to learning and self management: none    Discussed use, benefit, and side effects of prescribed medications. Barriers to medication compliance addressed. All patient questions answered. Pt voiced understanding.        Electronically signed by AMY Samaniego CNP on 6/10/2019 at 11:23 AM

## 2019-06-12 ENCOUNTER — PATIENT MESSAGE (OUTPATIENT)
Dept: FAMILY MEDICINE CLINIC | Age: 34
End: 2019-06-12

## 2019-06-12 ENCOUNTER — TELEPHONE (OUTPATIENT)
Dept: FAMILY MEDICINE CLINIC | Age: 34
End: 2019-06-12

## 2019-06-12 ENCOUNTER — HOSPITAL ENCOUNTER (OUTPATIENT)
Dept: INTERVENTIONAL RADIOLOGY/VASCULAR | Age: 34
Discharge: HOME | End: 2019-06-14
Payer: COMMERCIAL

## 2019-06-12 DIAGNOSIS — R11.0 NAUSEA WITHOUT VOMITING: ICD-10-CM

## 2019-06-12 DIAGNOSIS — R10.10 UPPER ABDOMINAL PAIN: ICD-10-CM

## 2019-06-12 DIAGNOSIS — R10.10 UPPER ABDOMINAL PAIN: Primary | ICD-10-CM

## 2019-06-12 PROCEDURE — 76705 ECHO EXAM OF ABDOMEN: CPT

## 2019-06-12 NOTE — TELEPHONE ENCOUNTER
RUMA, ok per [facility].   Notified of future testing, advised to call back for any additional questions or concerns       **orders placed up front in tray

## 2019-06-12 NOTE — TELEPHONE ENCOUNTER
From: Dequan Harris  To: Keshia Woods, APRN - CNP  Sent: 6/12/2019 1:31 PM EDT  Subject: Test Results Question    Since my gallbladder came back as normal could you refer me to a GI doctor in [place] with [facility].     Thank you   Marcos Arriaga

## 2019-06-12 NOTE — TELEPHONE ENCOUNTER
----- Message from AMY Valenzuela CNP sent at 6/12/2019 12:30 PM EDT -----  Let Sherly know the US of her gallbladder is normal,no evidence of gallstones. I would like to get a HIDA scan, which is a nuclear medicine test which measures the function of the gallbladder. If this comes back normal, then I'd recommend she f/u with GI.  Order placed

## 2019-06-30 DIAGNOSIS — J30.1 SEASONAL ALLERGIC RHINITIS DUE TO POLLEN: ICD-10-CM

## 2019-06-30 DIAGNOSIS — J44.9 CHRONIC OBSTRUCTIVE PULMONARY DISEASE, UNSPECIFIED COPD TYPE (HCC): ICD-10-CM

## 2019-07-01 RX ORDER — FEXOFENADINE HCL AND PSEUDOEPHEDRINE HCI 180; 240 MG/1; MG/1
1 TABLET, EXTENDED RELEASE ORAL DAILY
Qty: 30 TABLET | Refills: 5 | Status: SHIPPED | OUTPATIENT
Start: 2019-07-01 | End: 2020-03-13 | Stop reason: SDUPTHER

## 2019-07-01 RX ORDER — IPRATROPIUM BROMIDE AND ALBUTEROL SULFATE 2.5; .5 MG/3ML; MG/3ML
1 SOLUTION RESPIRATORY (INHALATION) EVERY 4 HOURS PRN
Qty: 360 ML | Refills: 3 | Status: SHIPPED | OUTPATIENT
Start: 2019-07-01 | End: 2019-09-11 | Stop reason: ALTCHOICE

## 2019-07-03 DIAGNOSIS — J45.20 MILD INTERMITTENT ASTHMA WITHOUT COMPLICATION: ICD-10-CM

## 2019-07-10 RX ORDER — ALBUTEROL SULFATE 90 UG/1
2 AEROSOL, METERED RESPIRATORY (INHALATION) EVERY 6 HOURS PRN
Qty: 1 INHALER | Refills: 2 | Status: SHIPPED | OUTPATIENT
Start: 2019-07-10 | End: 2020-02-10 | Stop reason: SDUPTHER

## 2019-07-12 ENCOUNTER — HOSPITAL ENCOUNTER (OUTPATIENT)
Dept: NUCLEAR MEDICINE | Age: 34
Discharge: HOME | End: 2019-07-14
Payer: COMMERCIAL

## 2019-07-12 ENCOUNTER — TELEPHONE (OUTPATIENT)
Dept: FAMILY MEDICINE CLINIC | Age: 34
End: 2019-07-12

## 2019-07-12 DIAGNOSIS — K21.9 GASTROESOPHAGEAL REFLUX DISEASE, ESOPHAGITIS PRESENCE NOT SPECIFIED: ICD-10-CM

## 2019-07-12 DIAGNOSIS — R11.0 NAUSEA WITHOUT VOMITING: ICD-10-CM

## 2019-07-12 DIAGNOSIS — R10.10 UPPER ABDOMINAL PAIN: Primary | ICD-10-CM

## 2019-07-12 DIAGNOSIS — R10.10 UPPER ABDOMINAL PAIN: ICD-10-CM

## 2019-07-12 PROCEDURE — 78227 HEPATOBIL SYST IMAGE W/DRUG: CPT

## 2019-07-12 PROCEDURE — A9537 TC99M MEBROFENIN: HCPCS | Performed by: NURSE PRACTITIONER

## 2019-07-12 PROCEDURE — 3430000000 HC RX DIAGNOSTIC RADIOPHARMACEUTICAL: Performed by: NURSE PRACTITIONER

## 2019-07-12 RX ADMIN — Medication 6 MILLICURIE: at 12:23

## 2019-07-25 DIAGNOSIS — K21.9 GASTROESOPHAGEAL REFLUX DISEASE, ESOPHAGITIS PRESENCE NOT SPECIFIED: ICD-10-CM

## 2019-07-25 RX ORDER — PANTOPRAZOLE SODIUM 40 MG/1
TABLET, DELAYED RELEASE ORAL
Qty: 90 TABLET | Refills: 0 | Status: SHIPPED | OUTPATIENT
Start: 2019-07-25 | End: 2019-09-11 | Stop reason: SDUPTHER

## 2019-08-09 ENCOUNTER — HOSPITAL ENCOUNTER (OUTPATIENT)
Dept: LAB | Age: 34
Discharge: HOME | End: 2019-08-09
Payer: COMMERCIAL

## 2019-08-09 LAB
ABSOLUTE EOS #: 0.1 K/UL (ref 0–0.4)
ABSOLUTE IMMATURE GRANULOCYTE: ABNORMAL K/UL (ref 0–0.3)
ABSOLUTE LYMPH #: 2.4 K/UL (ref 1–4.8)
ABSOLUTE MONO #: 0.8 K/UL (ref 0.1–1.2)
ALBUMIN SERPL-MCNC: 4.5 G/DL (ref 3.5–5.2)
ALBUMIN/GLOBULIN RATIO: 1.8 (ref 1–2.5)
ALP BLD-CCNC: 57 U/L (ref 35–104)
ALT SERPL-CCNC: 14 U/L (ref 5–33)
ANION GAP SERPL CALCULATED.3IONS-SCNC: 10 MMOL/L (ref 9–17)
AST SERPL-CCNC: 16 U/L
BASOPHILS # BLD: 1 % (ref 0–1)
BASOPHILS ABSOLUTE: 0.1 K/UL (ref 0–0.2)
BILIRUB SERPL-MCNC: 0.39 MG/DL (ref 0.3–1.2)
BUN BLDV-MCNC: 9 MG/DL (ref 6–20)
BUN/CREAT BLD: 15 (ref 9–20)
CALCIUM SERPL-MCNC: 9.1 MG/DL (ref 8.6–10.4)
CHLORIDE BLD-SCNC: 101 MMOL/L (ref 98–107)
CO2: 25 MMOL/L (ref 20–31)
CREAT SERPL-MCNC: 0.59 MG/DL (ref 0.5–0.9)
DIFFERENTIAL TYPE: ABNORMAL
EOSINOPHILS RELATIVE PERCENT: 1 % (ref 1–7)
GFR AFRICAN AMERICAN: >60 ML/MIN
GFR NON-AFRICAN AMERICAN: >60 ML/MIN
GFR SERPL CREATININE-BSD FRML MDRD: NORMAL ML/MIN/{1.73_M2}
GFR SERPL CREATININE-BSD FRML MDRD: NORMAL ML/MIN/{1.73_M2}
GLUCOSE BLD-MCNC: 81 MG/DL (ref 70–99)
HCT VFR BLD CALC: 39.4 % (ref 36–46)
HEMOGLOBIN: 13.6 G/DL (ref 12–16)
IGA: 194 MG/DL (ref 70–400)
IMMATURE GRANULOCYTES: ABNORMAL %
LYMPHOCYTES # BLD: 26 % (ref 16–46)
MCH RBC QN AUTO: 34.4 PG (ref 26–34)
MCHC RBC AUTO-ENTMCNC: 34.5 G/DL (ref 31–37)
MCV RBC AUTO: 99.5 FL (ref 80–100)
MONOCYTES # BLD: 9 % (ref 4–11)
NRBC AUTOMATED: ABNORMAL PER 100 WBC
PDW BLD-RTO: 13 % (ref 11–14.5)
PLATELET # BLD: 292 K/UL (ref 140–450)
PLATELET ESTIMATE: ABNORMAL
PMV BLD AUTO: 7.5 FL (ref 6–12)
POTASSIUM SERPL-SCNC: 4.1 MMOL/L (ref 3.7–5.3)
RBC # BLD: 3.95 M/UL (ref 4–5.2)
RBC # BLD: ABNORMAL 10*6/UL
SEG NEUTROPHILS: 63 % (ref 43–77)
SEGMENTED NEUTROPHILS ABSOLUTE COUNT: 5.8 K/UL (ref 1.8–7.7)
SODIUM BLD-SCNC: 136 MMOL/L (ref 135–144)
TOTAL PROTEIN: 7 G/DL (ref 6.4–8.3)
TSH SERPL DL<=0.05 MIU/L-ACNC: 1 MIU/L (ref 0.3–5)
WBC # BLD: 9.1 K/UL (ref 3.5–11)
WBC # BLD: ABNORMAL 10*3/UL

## 2019-08-09 PROCEDURE — 85025 COMPLETE CBC W/AUTO DIFF WBC: CPT

## 2019-08-09 PROCEDURE — 82784 ASSAY IGA/IGD/IGG/IGM EACH: CPT

## 2019-08-09 PROCEDURE — 80053 COMPREHEN METABOLIC PANEL: CPT

## 2019-08-09 PROCEDURE — 84443 ASSAY THYROID STIM HORMONE: CPT

## 2019-08-09 PROCEDURE — 83516 IMMUNOASSAY NONANTIBODY: CPT

## 2019-08-09 PROCEDURE — 36415 COLL VENOUS BLD VENIPUNCTURE: CPT

## 2019-08-12 LAB
TISSUE TRANSGLUTAMINASE ANTIBODY IGG: <0.6 U/ML
TISSUE TRANSGLUTAMINASE IGA: 15 U/ML

## 2019-08-14 ENCOUNTER — HOSPITAL ENCOUNTER (OUTPATIENT)
Age: 34
Setting detail: SPECIMEN
Discharge: HOME | End: 2019-08-14
Payer: COMMERCIAL

## 2019-08-14 PROCEDURE — 83520 IMMUNOASSAY QUANT NOS NONAB: CPT

## 2019-08-14 PROCEDURE — 83630 LACTOFERRIN FECAL (QUAL): CPT

## 2019-08-15 LAB — LACTOFERRIN, QUAL: NORMAL

## 2019-08-17 LAB — FECAL PANCREATIC ELASTASE-1: >500 UG/G

## 2019-09-01 ENCOUNTER — OFFICE VISIT (OUTPATIENT)
Dept: PRIMARY CARE CLINIC | Age: 34
End: 2019-09-01

## 2019-09-01 ENCOUNTER — HOSPITAL ENCOUNTER (EMERGENCY)
Age: 34
Discharge: HOME | End: 2019-09-01
Attending: SPECIALIST
Payer: COMMERCIAL

## 2019-09-01 ENCOUNTER — APPOINTMENT (OUTPATIENT)
Dept: GENERAL RADIOLOGY | Age: 34
End: 2019-09-01
Payer: COMMERCIAL

## 2019-09-01 VITALS
HEART RATE: 88 BPM | WEIGHT: 108 LBS | RESPIRATION RATE: 16 BRPM | HEIGHT: 60 IN | BODY MASS INDEX: 21.2 KG/M2 | TEMPERATURE: 97.9 F | OXYGEN SATURATION: 98 %

## 2019-09-01 VITALS
BODY MASS INDEX: 21.77 KG/M2 | RESPIRATION RATE: 16 BRPM | TEMPERATURE: 97.2 F | HEIGHT: 59 IN | OXYGEN SATURATION: 100 % | HEART RATE: 71 BPM | WEIGHT: 108 LBS | SYSTOLIC BLOOD PRESSURE: 124 MMHG | DIASTOLIC BLOOD PRESSURE: 77 MMHG

## 2019-09-01 DIAGNOSIS — S61.412A LACERATION OF LEFT HAND WITHOUT FOREIGN BODY, INITIAL ENCOUNTER: Primary | ICD-10-CM

## 2019-09-01 PROCEDURE — 2500000003 HC RX 250 WO HCPCS: Performed by: SPECIALIST

## 2019-09-01 PROCEDURE — 99283 EMERGENCY DEPT VISIT LOW MDM: CPT

## 2019-09-01 PROCEDURE — 73130 X-RAY EXAM OF HAND: CPT

## 2019-09-01 PROCEDURE — 12001 RPR S/N/AX/GEN/TRNK 2.5CM/<: CPT

## 2019-09-01 PROCEDURE — 99999 PR OFFICE/OUTPT VISIT,PROCEDURE ONLY: CPT | Performed by: FAMILY MEDICINE

## 2019-09-01 PROCEDURE — 6370000000 HC RX 637 (ALT 250 FOR IP): Performed by: SPECIALIST

## 2019-09-01 RX ORDER — DIAPER,BRIEF,INFANT-TODD,DISP
EACH MISCELLANEOUS ONCE
Status: COMPLETED | OUTPATIENT
Start: 2019-09-01 | End: 2019-09-01

## 2019-09-01 RX ORDER — LIDOCAINE HYDROCHLORIDE 10 MG/ML
5 INJECTION, SOLUTION INFILTRATION; PERINEURAL ONCE
Status: COMPLETED | OUTPATIENT
Start: 2019-09-01 | End: 2019-09-01

## 2019-09-01 RX ADMIN — LIDOCAINE HYDROCHLORIDE 5 ML: 10 INJECTION, SOLUTION INFILTRATION; PERINEURAL at 18:36

## 2019-09-01 RX ADMIN — BACITRACIN ZINC 1 G: 500 OINTMENT TOPICAL at 18:37

## 2019-09-01 ASSESSMENT — PAIN SCALES - GENERAL
PAINLEVEL_OUTOF10: 8

## 2019-09-01 ASSESSMENT — PAIN DESCRIPTION - DESCRIPTORS: DESCRIPTORS: SHARP

## 2019-09-01 ASSESSMENT — PAIN DESCRIPTION - ORIENTATION: ORIENTATION: LEFT

## 2019-09-01 ASSESSMENT — PAIN DESCRIPTION - PAIN TYPE: TYPE: ACUTE PAIN

## 2019-09-01 ASSESSMENT — PAIN DESCRIPTION - LOCATION: LOCATION: HAND

## 2019-09-01 NOTE — PROGRESS NOTES
1735 P/W/D, easy RR, NAD noted, Pt politely refuses BP d/t holding pressure on hand lac. & being in great pain.

## 2019-09-01 NOTE — ED PROVIDER NOTES
Notes    Patient is feeling much better and resting comfortably. CRITICAL CARE:   None        CONSULTS:      PROCEDURES:  Laceration Repair Procedure Note    Indication: Laceration    Procedure: The patient was placed in the appropriate position and anesthesia around the laceration was obtained by infiltration using 1% Lidocaine without epinephrine. The area was then cleansed with betadine and draped in a sterile fashion. The laceration was closed with 4-0 Prolene using interrupted sutures. There were no additional lacerations requiring repair. The wound area was then dressed with bacitracin and a sterile dressing. Total repaired wound length: 2 cm. Other Items: Suture count: 4    The patient tolerated the procedure well. Complications: None        FINAL IMPRESSION      1. Laceration of left hand without foreign body, initial encounter          DISPOSITION/PLAN   DISPOSITION Decision To Discharge    Condition on Disposition    Stable    PATIENT REFERRED TO:  AMY Padilla CNP  [address] [address] [phone]    Call in 2 days  For wound re-check    AMY Padilla CNP  [address] [facility] [address]   [phone]    Call in 10 days  For suture removal    [address] [place] [address].  [phone]    If symptoms worsen      DISCHARGE MEDICATIONS:  New Prescriptions    No medications on file       (Please note that portions of this note were completed with a voice recognition program.  Efforts were made to edit the dictations but occasionally words are mis-transcribed.)    Alejandro MD, F.A.C.E.P.   Attending Emergency Physician                         Odilon Gustafson MD  09/01/19 [id number]

## 2019-09-11 ENCOUNTER — OFFICE VISIT (OUTPATIENT)
Dept: FAMILY MEDICINE CLINIC | Age: 34
End: 2019-09-11
Payer: COMMERCIAL

## 2019-09-11 VITALS
RESPIRATION RATE: 12 BRPM | TEMPERATURE: 99.4 F | BODY MASS INDEX: 20.06 KG/M2 | WEIGHT: 109 LBS | HEIGHT: 62 IN | DIASTOLIC BLOOD PRESSURE: 54 MMHG | HEART RATE: 94 BPM | SYSTOLIC BLOOD PRESSURE: 108 MMHG

## 2019-09-11 DIAGNOSIS — K21.9 GASTROESOPHAGEAL REFLUX DISEASE, ESOPHAGITIS PRESENCE NOT SPECIFIED: ICD-10-CM

## 2019-09-11 DIAGNOSIS — Z23 NEED FOR INFLUENZA VACCINATION: Primary | ICD-10-CM

## 2019-09-11 DIAGNOSIS — Z48.02 ENCOUNTER FOR REMOVAL OF SUTURES: ICD-10-CM

## 2019-09-11 DIAGNOSIS — S61.412A LACERATION OF LEFT HAND WITHOUT FOREIGN BODY, INITIAL ENCOUNTER: ICD-10-CM

## 2019-09-11 DIAGNOSIS — F41.1 GAD (GENERALIZED ANXIETY DISORDER): ICD-10-CM

## 2019-09-11 PROCEDURE — 90471 IMMUNIZATION ADMIN: CPT | Performed by: NURSE PRACTITIONER

## 2019-09-11 PROCEDURE — 99213 OFFICE O/P EST LOW 20 MIN: CPT | Performed by: NURSE PRACTITIONER

## 2019-09-11 PROCEDURE — 90688 IIV4 VACCINE SPLT 0.5 ML IM: CPT | Performed by: NURSE PRACTITIONER

## 2019-09-11 RX ORDER — PANTOPRAZOLE SODIUM 40 MG/1
TABLET, DELAYED RELEASE ORAL
Qty: 90 TABLET | Refills: 3 | Status: SHIPPED | OUTPATIENT
Start: 2019-09-11 | End: 2019-12-26 | Stop reason: SDUPTHER

## 2019-09-11 RX ORDER — VANCOMYCIN HYDROCHLORIDE 250 MG/1
500 CAPSULE ORAL EVERY OTHER DAY
COMMUNITY
End: 2020-03-11

## 2019-09-11 RX ORDER — CLONAZEPAM 1 MG/1
1 TABLET ORAL 2 TIMES DAILY PRN
Qty: 60 TABLET | Refills: 2 | Status: SHIPPED | OUTPATIENT
Start: 2019-09-11 | End: 2019-12-26 | Stop reason: SDUPTHER

## 2019-09-11 RX ORDER — ESCITALOPRAM OXALATE 20 MG/1
20 TABLET ORAL DAILY
Qty: 90 TABLET | Refills: 1 | Status: SHIPPED | OUTPATIENT
Start: 2019-09-11 | End: 2020-03-13 | Stop reason: SDUPTHER

## 2019-10-04 ENCOUNTER — HOSPITAL ENCOUNTER (OUTPATIENT)
Dept: CT IMAGING | Age: 34
Discharge: HOME | End: 2019-10-06
Payer: COMMERCIAL

## 2019-10-04 DIAGNOSIS — K90.0 CELIAC DISEASE: ICD-10-CM

## 2019-10-04 PROCEDURE — 2709999900 CT ABDOMEN PELVIS W IV CONTRAST

## 2019-10-04 PROCEDURE — 6360000004 HC RX CONTRAST MEDICATION: Performed by: INTERNAL MEDICINE

## 2019-10-04 RX ADMIN — IOHEXOL 50 ML: 240 INJECTION, SOLUTION INTRATHECAL; INTRAVASCULAR; INTRAVENOUS; ORAL at 09:35

## 2019-10-04 RX ADMIN — IOPAMIDOL 100 ML: 755 INJECTION, SOLUTION INTRAVENOUS at 09:36

## 2019-10-15 ENCOUNTER — PATIENT MESSAGE (OUTPATIENT)
Dept: FAMILY MEDICINE CLINIC | Age: 34
End: 2019-10-15

## 2019-10-15 DIAGNOSIS — A60.04 HERPES SIMPLEX VULVOVAGINITIS: ICD-10-CM

## 2019-10-16 RX ORDER — VALACYCLOVIR HYDROCHLORIDE 1 G/1
1000 TABLET, FILM COATED ORAL DAILY
Qty: 90 TABLET | Refills: 1 | Status: SHIPPED | OUTPATIENT
Start: 2019-10-16 | End: 2020-03-13 | Stop reason: SDUPTHER

## 2019-10-18 ENCOUNTER — HOSPITAL ENCOUNTER (OUTPATIENT)
Dept: LAB | Age: 34
Discharge: HOME | End: 2019-10-18
Payer: COMMERCIAL

## 2019-10-18 LAB
HCT VFR BLD CALC: 39.7 % (ref 36–46)
HEMOGLOBIN: 13.5 G/DL (ref 12–16)
IRON SATURATION: 46 % (ref 20–55)
IRON: 119 UG/DL (ref 37–145)
MCH RBC QN AUTO: 33.8 PG (ref 26–34)
MCHC RBC AUTO-ENTMCNC: 34 G/DL (ref 31–37)
MCV RBC AUTO: 99.5 FL (ref 80–100)
NRBC AUTOMATED: ABNORMAL PER 100 WBC
PDW BLD-RTO: 12.6 % (ref 11–14.5)
PLATELET # BLD: 361 K/UL (ref 140–450)
PMV BLD AUTO: 7.4 FL (ref 6–12)
RBC # BLD: 3.99 M/UL (ref 4–5.2)
TOTAL IRON BINDING CAPACITY: 260 UG/DL (ref 250–450)
UNSATURATED IRON BINDING CAPACITY: 141 UG/DL (ref 112–347)
WBC # BLD: 7.5 K/UL (ref 3.5–11)

## 2019-10-18 PROCEDURE — 85027 COMPLETE CBC AUTOMATED: CPT

## 2019-10-18 PROCEDURE — 36415 COLL VENOUS BLD VENIPUNCTURE: CPT

## 2019-10-18 PROCEDURE — 83540 ASSAY OF IRON: CPT

## 2019-10-18 PROCEDURE — 83550 IRON BINDING TEST: CPT

## 2019-12-26 DIAGNOSIS — K21.9 GASTROESOPHAGEAL REFLUX DISEASE, ESOPHAGITIS PRESENCE NOT SPECIFIED: ICD-10-CM

## 2019-12-26 DIAGNOSIS — F41.1 GAD (GENERALIZED ANXIETY DISORDER): ICD-10-CM

## 2019-12-26 RX ORDER — PANTOPRAZOLE SODIUM 40 MG/1
TABLET, DELAYED RELEASE ORAL
Qty: 90 TABLET | Refills: 3 | Status: SHIPPED | OUTPATIENT
Start: 2019-12-26 | End: 2020-03-13 | Stop reason: SDUPTHER

## 2019-12-26 RX ORDER — CLONAZEPAM 1 MG/1
1 TABLET ORAL 2 TIMES DAILY PRN
Qty: 60 TABLET | Refills: 2 | Status: SHIPPED | OUTPATIENT
Start: 2019-12-26 | End: 2020-03-28

## 2020-01-21 ENCOUNTER — TELEPHONE (OUTPATIENT)
Dept: FAMILY MEDICINE CLINIC | Age: 35
End: 2020-01-21

## 2020-02-13 RX ORDER — ALBUTEROL SULFATE 90 UG/1
2 AEROSOL, METERED RESPIRATORY (INHALATION) EVERY 6 HOURS PRN
Qty: 1 INHALER | Refills: 2 | Status: SHIPPED | OUTPATIENT
Start: 2020-02-13

## 2020-03-11 ENCOUNTER — OFFICE VISIT (OUTPATIENT)
Dept: PRIMARY CARE CLINIC | Age: 35
End: 2020-03-11
Payer: COMMERCIAL

## 2020-03-11 VITALS
SYSTOLIC BLOOD PRESSURE: 102 MMHG | WEIGHT: 117.4 LBS | HEIGHT: 60 IN | BODY MASS INDEX: 23.05 KG/M2 | DIASTOLIC BLOOD PRESSURE: 60 MMHG | OXYGEN SATURATION: 98 % | RESPIRATION RATE: 16 BRPM | TEMPERATURE: 97.2 F | HEART RATE: 78 BPM

## 2020-03-11 LAB
INFLUENZA A ANTIBODY: NORMAL
INFLUENZA B ANTIBODY: NORMAL

## 2020-03-11 PROCEDURE — 99213 OFFICE O/P EST LOW 20 MIN: CPT | Performed by: FAMILY MEDICINE

## 2020-03-11 PROCEDURE — 87804 INFLUENZA ASSAY W/OPTIC: CPT | Performed by: FAMILY MEDICINE

## 2020-03-11 RX ORDER — BUDESONIDE 3 MG/1
CAPSULE, COATED PELLETS ORAL
COMMUNITY
Start: 2019-12-26 | End: 2020-03-25

## 2020-03-11 NOTE — PROGRESS NOTES
[facility]             [address] [place] [address] [place], [address]                        Telephone [phone]             Fax [phone]       Steve Blanco  1985  MRN:  [id number]  Date of visit:  3/11/2020     Subjective:    Steve Blanco is a 27 y.o.  female who presents to [facility] today (3/11/2020) for evaluation of:  Fever (saturday- fever, diarrhea, fatigue, cough, congestion, sore throat, dizzy, body aches)      She states that she has had fever, diarrhea, cough, and fatigue for the past 3-4 days. She also reports sore throat and body aches. She states that several of her co-workers have been ill. She denies recent travel. Current medications are:  Current Outpatient Medications   Medication Sig Dispense Refill    budesonide (ENTOCORT EC) 3 MG extended release capsule Take by mouth      albuterol sulfate HFA (PROAIR HFA) 108 (90 Base) MCG/ACT inhaler Inhale 2 puffs into the lungs every 6 hours as needed for Wheezing 1 Inhaler 2    clonazePAM (KLONOPIN) 1 MG tablet Take 1 tablet by mouth 2 times daily as needed for Anxiety (panic attacks) for up to 93 days. 60 tablet 2    pantoprazole (PROTONIX) 40 MG tablet take 1 tablet by mouth once daily 90 tablet 3    valACYclovir (VALTREX) 1 g tablet Take 1 tablet by mouth daily 90 tablet 1    escitalopram (LEXAPRO) 20 MG tablet Take 1 tablet by mouth daily 90 tablet 1    fexofenadine-pseudoephedrine (ALLEGRA-D ALLERGY & CONGESTION) 180-240 MG per extended release tablet Take 1 tablet by mouth daily 30 tablet 5    Respiratory Therapy Supplies (NEBULIZER/TUBING/MOUTHPIECE) KIT 1 kit by Does not apply route daily as needed (shortness of breath) 1 kit 0    EPINEPHrine HCl, Anaphylaxis, (EPIPEN IM) Inject  into the muscle as needed. No current facility-administered medications for this visit.         She is allergic to latex; peanuts [peanut oil];

## 2020-03-13 ENCOUNTER — OFFICE VISIT (OUTPATIENT)
Dept: FAMILY MEDICINE CLINIC | Age: 35
End: 2020-03-13
Payer: COMMERCIAL

## 2020-03-13 VITALS
RESPIRATION RATE: 16 BRPM | TEMPERATURE: 98.4 F | HEIGHT: 61 IN | DIASTOLIC BLOOD PRESSURE: 70 MMHG | WEIGHT: 122.6 LBS | SYSTOLIC BLOOD PRESSURE: 102 MMHG | BODY MASS INDEX: 23.15 KG/M2 | HEART RATE: 95 BPM

## 2020-03-13 PROBLEM — Z79.899 CONTROLLED SUBSTANCE AGREEMENT SIGNED: Status: ACTIVE | Noted: 2020-03-13

## 2020-03-13 LAB
AMPHETAMINE+METHAMPHETAMINE URINE SCREEN: NEGATIVE
BARBITURATE QUANTITATIVE URINE: NEGATIVE
BENZODIAZEPINE QUANTITATIVE URINE: NEGATIVE
CANNABINOID QUANTITATIVE URINE: POSITIVE
COCAINE METABOLITE QUANTITATIVE URINE: NEGATIVE
OPIATES, URINE: NEGATIVE
OXYCODONE: POSITIVE
PHENCYCLIDINE QUANTITATIVE URINE: NEGATIVE

## 2020-03-13 PROCEDURE — 99214 OFFICE O/P EST MOD 30 MIN: CPT | Performed by: NURSE PRACTITIONER

## 2020-03-13 RX ORDER — BUSPIRONE HYDROCHLORIDE 7.5 MG/1
7.5 TABLET ORAL 2 TIMES DAILY
Qty: 60 TABLET | Refills: 3 | Status: SHIPPED | OUTPATIENT
Start: 2020-03-13

## 2020-03-13 RX ORDER — FEXOFENADINE HCL AND PSEUDOEPHEDRINE HCI 180; 240 MG/1; MG/1
1 TABLET, EXTENDED RELEASE ORAL DAILY
Qty: 30 TABLET | Refills: 5 | Status: SHIPPED | OUTPATIENT
Start: 2020-03-13

## 2020-03-13 RX ORDER — ESCITALOPRAM OXALATE 20 MG/1
20 TABLET ORAL DAILY
Qty: 90 TABLET | Refills: 1 | Status: SHIPPED | OUTPATIENT
Start: 2020-03-13

## 2020-03-13 RX ORDER — PANTOPRAZOLE SODIUM 40 MG/1
TABLET, DELAYED RELEASE ORAL
Qty: 90 TABLET | Refills: 3 | Status: SHIPPED | OUTPATIENT
Start: 2020-03-13

## 2020-03-13 RX ORDER — VALACYCLOVIR HYDROCHLORIDE 1 G/1
1000 TABLET, FILM COATED ORAL DAILY
Qty: 90 TABLET | Refills: 1 | Status: SHIPPED | OUTPATIENT
Start: 2020-03-13

## 2020-03-13 RX ORDER — CLONAZEPAM 1 MG/1
1 TABLET ORAL 2 TIMES DAILY PRN
Qty: 60 TABLET | Refills: 2 | Status: CANCELLED | OUTPATIENT
Start: 2020-03-13 | End: 2020-06-14

## 2020-03-13 ASSESSMENT — PATIENT HEALTH QUESTIONNAIRE - PHQ9
SUM OF ALL RESPONSES TO PHQ QUESTIONS 1-9: 0
2. FEELING DOWN, DEPRESSED OR HOPELESS: 0
SUM OF ALL RESPONSES TO PHQ QUESTIONS 1-9: 0
1. LITTLE INTEREST OR PLEASURE IN DOING THINGS: 0
SUM OF ALL RESPONSES TO PHQ9 QUESTIONS 1 & 2: 0

## 2020-03-13 NOTE — PROGRESS NOTES
Cancer Screening (Age 54 to [de-identified] with 27 pack year hx, current smoker or quit within past 15 years) - n/a    Tetanus - needs  Influenza Vaccine - next due 8/16  Pneumonia Vaccine - 64  Zostavax - 49  HPV Vaccine - n/a    Breast Cancer Screening - 49  Cervical Cancer Screening - n/a, has had hysterectomy  Osteoporosis Screening - 71  Chlamydia Screen - n/a    AAA Screening - n/a    Falls screening - n/a    Current Outpatient Medications   Medication Sig Dispense Refill    escitalopram (LEXAPRO) 20 MG tablet Take 1 tablet by mouth daily 90 tablet 1    fexofenadine-pseudoephedrine (ALLEGRA-D ALLERGY & CONGESTION) 180-240 MG per extended release tablet Take 1 tablet by mouth daily 30 tablet 5    pantoprazole (PROTONIX) 40 MG tablet take 1 tablet by mouth once daily 90 tablet 3    valACYclovir (VALTREX) 1 g tablet Take 1 tablet by mouth daily 90 tablet 1    busPIRone (BUSPAR) 7.5 MG tablet Take 1 tablet by mouth 2 times daily 60 tablet 3    albuterol sulfate HFA (PROAIR HFA) 108 (90 Base) MCG/ACT inhaler Inhale 2 puffs into the lungs every 6 hours as needed for Wheezing 1 Inhaler 2    clonazePAM (KLONOPIN) 1 MG tablet Take 1 tablet by mouth 2 times daily as needed for Anxiety (panic attacks) for up to 93 days. 60 tablet 2    Respiratory Therapy Supplies (NEBULIZER/TUBING/MOUTHPIECE) KIT 1 kit by Does not apply route daily as needed (shortness of breath) 1 kit 0    EPINEPHrine HCl, Anaphylaxis, (EPIPEN IM) Inject  into the muscle as needed.  budesonide (ENTOCORT EC) 3 MG extended release capsule Take by mouth       No current facility-administered medications for this visit.       Orders Placed This Encounter   Medications    escitalopram (LEXAPRO) 20 MG tablet     Sig: Take 1 tablet by mouth daily     Dispense:  90 tablet     Refill:  1    fexofenadine-pseudoephedrine (ALLEGRA-D ALLERGY & CONGESTION) 180-240 MG per extended release tablet     Sig: Take 1 tablet by mouth daily     Dispense:  30 tablet restrictions. PATIENT COUNSELING    Counseling was provided today regarding the following topics: Healthy eating habits, Regular exercise, substance abuse and healthy sleep habits. Sherly received counseling on the following healthy behaviors: tobacco cessation    Patient given educational materials on: See Attached    I have instructed Sherly to complete a self tracking handout on none and instructed them to bring it with them to her next appointment. Barriers to learning and self management: none    Discussed use, benefit, and side effects of prescribed medications. Barriers to medication compliance addressed. All patient questions answered. Pt voiced understanding.        Electronically signed by AMY Jolley CNP on 3/13/2020 at 12:03 PM

## 2020-03-16 ENCOUNTER — TELEPHONE (OUTPATIENT)
Dept: FAMILY MEDICINE CLINIC | Age: 35
End: 2020-03-16

## 2020-03-16 NOTE — TELEPHONE ENCOUNTER
----- Message from AMY Loera CNP sent at 3/16/2020  7:40 AM EDT -----  Let Mike Arechiga know her UDS was NEGATIVE for klonopin, and POSITIVE for oxycodone and marijuana. This violates her controlled substance agreement. I will not be prescribing the klonopin further and she will be dismissed as a patient.  Letter written

## 2020-04-25 RX ORDER — CLONAZEPAM 1 MG/1
TABLET ORAL
Qty: 60 TABLET | OUTPATIENT
Start: 2020-04-25

## 2024-09-06 ENCOUNTER — APPOINTMENT (OUTPATIENT)
Dept: CT IMAGING | Age: 39
DRG: 947 | End: 2024-09-06
Payer: COMMERCIAL

## 2024-09-06 ENCOUNTER — HOSPITAL ENCOUNTER (EMERGENCY)
Age: 39
Discharge: HOME | DRG: 947 | End: 2024-09-06
Attending: EMERGENCY MEDICINE
Payer: COMMERCIAL

## 2024-09-06 VITALS
TEMPERATURE: 98.8 F | HEIGHT: 61 IN | HEART RATE: 81 BPM | DIASTOLIC BLOOD PRESSURE: 77 MMHG | RESPIRATION RATE: 16 BRPM | WEIGHT: 107 LBS | SYSTOLIC BLOOD PRESSURE: 114 MMHG | BODY MASS INDEX: 20.2 KG/M2 | OXYGEN SATURATION: 100 %

## 2024-09-06 DIAGNOSIS — R10.9 ABDOMINAL PAIN, UNSPECIFIED ABDOMINAL LOCATION: Primary | ICD-10-CM

## 2024-09-06 LAB
ALBUMIN SERPL BCG-MCNC: 4.5 G/DL (ref 3.5–5.1)
ALP SERPL-CCNC: 64 U/L (ref 38–126)
ALT SERPL W/O P-5'-P-CCNC: 38 U/L (ref 11–66)
ANION GAP SERPL CALC-SCNC: 8 MEQ/L (ref 8–16)
AST SERPL-CCNC: 24 U/L (ref 5–40)
BASOPHILS ABSOLUTE: 0 THOU/MM3 (ref 0–0.1)
BASOPHILS NFR BLD AUTO: 0.6 %
BILIRUB SERPL-MCNC: 0.4 MG/DL (ref 0.3–1.2)
BILIRUB UR QL STRIP.AUTO: NEGATIVE
BUN SERPL-MCNC: 9 MG/DL (ref 7–22)
CALCIUM SERPL-MCNC: 8.9 MG/DL (ref 8.5–10.5)
CHARACTER UR: CLEAR
CHLORIDE SERPL-SCNC: 106 MEQ/L (ref 98–111)
CO2 SERPL-SCNC: 23 MEQ/L (ref 23–33)
COLOR, UA: YELLOW
CREAT SERPL-MCNC: 0.8 MG/DL (ref 0.4–1.2)
DEPRECATED RDW RBC AUTO: 39 FL (ref 35–45)
EOSINOPHIL NFR BLD AUTO: 0.5 %
EOSINOPHILS ABSOLUTE: 0 THOU/MM3 (ref 0–0.4)
ERYTHROCYTE [DISTWIDTH] IN BLOOD BY AUTOMATED COUNT: 11.7 % (ref 11.5–14.5)
GFR SERPL CREATININE-BSD FRML MDRD: > 90 ML/MIN/1.73M2
GLUCOSE SERPL-MCNC: 91 MG/DL (ref 70–108)
GLUCOSE UR QL STRIP.AUTO: NEGATIVE MG/DL
HCT VFR BLD AUTO: 38.7 % (ref 37–47)
HGB BLD-MCNC: 13.2 GM/DL (ref 12–16)
HGB UR QL STRIP.AUTO: NEGATIVE
IMM GRANULOCYTES # BLD AUTO: 0.02 THOU/MM3 (ref 0–0.07)
IMM GRANULOCYTES NFR BLD AUTO: 0.3 %
KETONES UR QL STRIP.AUTO: NEGATIVE
LIPASE SERPL-CCNC: 28.5 U/L (ref 5.6–51.3)
LYMPHOCYTES ABSOLUTE: 2.6 THOU/MM3 (ref 1–4.8)
LYMPHOCYTES NFR BLD AUTO: 33.8 %
MCH RBC QN AUTO: 31.1 PG (ref 26–33)
MCHC RBC AUTO-ENTMCNC: 34.1 GM/DL (ref 32.2–35.5)
MCV RBC AUTO: 91.1 FL (ref 81–99)
MONOCYTES ABSOLUTE: 0.5 THOU/MM3 (ref 0.4–1.3)
MONOCYTES NFR BLD AUTO: 6.9 %
NEUTROPHILS ABSOLUTE: 4.5 THOU/MM3 (ref 1.8–7.7)
NEUTROPHILS NFR BLD AUTO: 57.9 %
NITRITE UR QL STRIP: NEGATIVE
NRBC BLD AUTO-RTO: 0 /100 WBC
OSMOLALITY SERPL CALC.SUM OF ELEC: 272.1 MOSMOL/KG (ref 275–300)
PH UR STRIP.AUTO: 6 [PH] (ref 5–9)
PLATELET # BLD AUTO: 271 THOU/MM3 (ref 130–400)
PMV BLD AUTO: 9.2 FL (ref 9.4–12.4)
POTASSIUM SERPL-SCNC: 3.9 MEQ/L (ref 3.5–5.2)
PROT SERPL-MCNC: 6.6 G/DL (ref 6.1–8)
PROT UR STRIP.AUTO-MCNC: NEGATIVE MG/DL
RBC # BLD AUTO: 4.25 MILL/MM3 (ref 4.2–5.4)
SODIUM SERPL-SCNC: 137 MEQ/L (ref 135–145)
SP GR UR REFRACT.AUTO: 1.01 (ref 1–1.03)
UROBILINOGEN, URINE: 0.2 EU/DL (ref 0–1)
WBC # BLD AUTO: 7.7 THOU/MM3 (ref 4.8–10.8)
WBC #/AREA URNS HPF: NEGATIVE /[HPF]

## 2024-09-06 PROCEDURE — 36415 COLL VENOUS BLD VENIPUNCTURE: CPT

## 2024-09-06 PROCEDURE — 74177 CT ABD & PELVIS W/CONTRAST: CPT

## 2024-09-06 PROCEDURE — 6360000004 HC RX CONTRAST MEDICATION: Performed by: EMERGENCY MEDICINE

## 2024-09-06 PROCEDURE — 81003 URINALYSIS AUTO W/O SCOPE: CPT

## 2024-09-06 PROCEDURE — 80053 COMPREHEN METABOLIC PANEL: CPT

## 2024-09-06 PROCEDURE — 96375 TX/PRO/DX INJ NEW DRUG ADDON: CPT

## 2024-09-06 PROCEDURE — 96374 THER/PROPH/DIAG INJ IV PUSH: CPT

## 2024-09-06 PROCEDURE — 6370000000 HC RX 637 (ALT 250 FOR IP)

## 2024-09-06 PROCEDURE — 83690 ASSAY OF LIPASE: CPT

## 2024-09-06 PROCEDURE — 2580000003 HC RX 258

## 2024-09-06 PROCEDURE — 6360000002 HC RX W HCPCS

## 2024-09-06 PROCEDURE — 85025 COMPLETE CBC W/AUTO DIFF WBC: CPT

## 2024-09-06 PROCEDURE — 99285 EMERGENCY DEPT VISIT HI MDM: CPT

## 2024-09-06 RX ORDER — HYDROCODONE BITARTRATE AND ACETAMINOPHEN 5; 325 MG/1; MG/1
1 TABLET ORAL EVERY 12 HOURS
Qty: 4 TABLET | Refills: 0 | Status: SHIPPED | OUTPATIENT
Start: 2024-09-06 | End: 2024-09-07 | Stop reason: ALTCHOICE

## 2024-09-06 RX ORDER — MORPHINE SULFATE 2 MG/ML
2 INJECTION, SOLUTION INTRAMUSCULAR; INTRAVENOUS ONCE
Status: COMPLETED | OUTPATIENT
Start: 2024-09-06 | End: 2024-09-06

## 2024-09-06 RX ORDER — 0.9 % SODIUM CHLORIDE 0.9 %
1000 INTRAVENOUS SOLUTION INTRAVENOUS ONCE
Status: COMPLETED | OUTPATIENT
Start: 2024-09-06 | End: 2024-09-06

## 2024-09-06 RX ORDER — FENTANYL CITRATE 50 UG/ML
50 INJECTION, SOLUTION INTRAMUSCULAR; INTRAVENOUS ONCE
Status: COMPLETED | OUTPATIENT
Start: 2024-09-06 | End: 2024-09-06

## 2024-09-06 RX ORDER — IOPAMIDOL 755 MG/ML
80 INJECTION, SOLUTION INTRAVASCULAR
Status: COMPLETED | OUTPATIENT
Start: 2024-09-06 | End: 2024-09-06

## 2024-09-06 RX ORDER — ONDANSETRON 2 MG/ML
4 INJECTION INTRAMUSCULAR; INTRAVENOUS ONCE
Status: COMPLETED | OUTPATIENT
Start: 2024-09-06 | End: 2024-09-06

## 2024-09-06 RX ORDER — IOPAMIDOL 755 MG/ML
80 INJECTION, SOLUTION INTRAVASCULAR
Status: DISCONTINUED | OUTPATIENT
Start: 2024-09-06 | End: 2024-09-06

## 2024-09-06 RX ORDER — HYDROCODONE BITARTRATE AND ACETAMINOPHEN 5; 325 MG/1; MG/1
1 TABLET ORAL ONCE
Status: COMPLETED | OUTPATIENT
Start: 2024-09-06 | End: 2024-09-06

## 2024-09-06 RX ADMIN — SODIUM CHLORIDE 1000 ML: 9 INJECTION, SOLUTION INTRAVENOUS at 18:49

## 2024-09-06 RX ADMIN — FENTANYL CITRATE 50 MCG: 50 INJECTION, SOLUTION INTRAMUSCULAR; INTRAVENOUS at 20:11

## 2024-09-06 RX ADMIN — ONDANSETRON 4 MG: 2 INJECTION INTRAMUSCULAR; INTRAVENOUS at 18:50

## 2024-09-06 RX ADMIN — MORPHINE SULFATE 2 MG: 2 INJECTION, SOLUTION INTRAMUSCULAR; INTRAVENOUS at 18:50

## 2024-09-06 RX ADMIN — HYDROCODONE BITARTRATE AND ACETAMINOPHEN 1 TABLET: 5; 325 TABLET ORAL at 21:58

## 2024-09-06 RX ADMIN — IOPAMIDOL 80 ML: 755 INJECTION, SOLUTION INTRAVENOUS at 19:21

## 2024-09-06 ASSESSMENT — PAIN SCALES - GENERAL
PAINLEVEL_OUTOF10: 7
PAINLEVEL_OUTOF10: 9
PAINLEVEL_OUTOF10: 7
PAINLEVEL_OUTOF10: 6
PAINLEVEL_OUTOF10: 9

## 2024-09-06 ASSESSMENT — PAIN - FUNCTIONAL ASSESSMENT
PAIN_FUNCTIONAL_ASSESSMENT: 0-10

## 2024-09-06 ASSESSMENT — PAIN DESCRIPTION - ORIENTATION: ORIENTATION: LEFT

## 2024-09-06 ASSESSMENT — PAIN DESCRIPTION - LOCATION: LOCATION: ABDOMEN

## 2024-09-06 NOTE — ED PROVIDER NOTES

## 2024-09-06 NOTE — ED NOTES
ED nurse-to-nurse bedside report    Chief Complaint   Patient presents with    Abdominal Pain      LOC: alert and orientated to name, place, date  Vital signs   Vitals:    09/06/24 1706 09/06/24 1812 09/06/24 1849 09/06/24 1909   BP: 122/84 124/79 112/72 113/78   Pulse: 75 77 65 69   Resp: 18 18 18 16   Temp: 98.8 °F (37.1 °C)      TempSrc: Oral      SpO2: 99% 99% 98% 97%   Weight: 48.5 kg (107 lb)      Height: 1.549 m (5' 1\")         Pain:    Pain Interventions: morphine  Pain Goal: 3  Oxygen: No    Current needs required RA   Telemetry: No  LDAs:   Peripheral IV 09/06/24 Left Antecubital (Active)     Continuous Infusions:   Mobility: Independent  Gomez Fall Risk Score:        No data to display              Fall Interventions: side rails up, call light in reach, wheels locked  Report given to: Lakshmi PAYNE

## 2024-09-06 NOTE — ED NOTES
Pt and vs reassessed. RR easy and unlabored. Pt resting in bed alert and medicated per MAR. Pt stable at this time

## 2024-09-06 NOTE — ED TRIAGE NOTES
Pt presents to the ED through lobby with c/o abdominal pain. Pt states she had a colonoscopy earlier today and is now having abdominal pain. Denies any difficulties with the procedure but states \"he just kept saying my sigmoid colon was weird and had a hard time getting through it\". States she was told to \"eat a little to see if it helps the pain\" but pain worsened after she ate. She has had a small, loose bowel movement since the procedure. States pain is 9 out of 10. Reports history of celiac disease, appendectomy, partial hysterectomy and cholecystectomy.

## 2024-09-06 NOTE — ED NOTES
Pt and vs reassessed. RR easy and unlabored. Pt resting in bed alert and Dr. Hubbard at bedside to assess pt. Pt stable at this time

## 2024-09-06 NOTE — ED PROVIDER NOTES
[facility] EMERGENCY DEPT  EMERGENCY DEPARTMENT ENCOUNTER          Pt Name: Ana Puente  MRN: [id number]  Birthdate 1985  Date of evaluation: 9/6/2024  Physician: Kylie Hubbard MD  Supervising Attending Physician: Ya att. providers found       CHIEF COMPLAINT       Chief Complaint   Patient presents with    Abdominal Pain         HISTORY OF PRESENT ILLNESS    HPI  Ana Puente is a 39 y.o. past medical history of stroke, celiac's disease, asthma and superior mesenteric artery syndrome, female who presents to the emergency department from home for evaluation of left-sided abdominal pain status post a colonoscopy today by , gastroenterologist.  Patient relates that Dr. Dahl told her that her sigmoid colon was difficult to assess.  Patient rates her pain as a 9 out of 10 with radiation to the right side of her abdomen.  Relates that she has not had a bowel movement since the colonoscopy.  She has urinated once and describes it as dark in color.  Relates having a low-grade fever of 99 °F at home.  Patient had the colonoscopy today because of weight loss.  States she is had a decreased appetite for several weeks and has had weight loss.  Relates having paresthesias on her left side that are residual from her stroke.  She also states that she has had double vision for the past 90 days and is being evaluated by Dr. Davidson and ophthalmologist who is trying to get her into a neurological ophthalmologist.  Denies nausea, vomiting, dysuria, urinary frequency, chest pain, shortness of breath, chills.  Patient has had multiple abdominal surgeries including an appendectomy, cholecystectomy and hysterectomy.  Patient is a former smoker.  The patient has no other acute complaints at this time.      PAST MEDICAL AND SURGICAL HISTORY     Past Medical History:   Diagnosis Date    Anxiety     Celiac sprue     Paralysis gastric     Paralysis of colon (HCC)     Pneumonia 2010    Superior mesenteric  artery syndrome (HCC)     Uncomplicated severe persistent asthma 11/28/2016     Past Surgical History:   Procedure Laterality Date    ANTERIOR CRUCIATE LIGAMENT REPAIR      CLAVICLE EXCISION Left     COLONOSCOPY  2002    Dr. Dahl    HYSTERECTOMY, TOTAL ABDOMINAL (CERVIX REMOVED)      partial maintains ovaries    INCISIONAL HERNIA REPAIR Right 08/22/2014    [facility]    LAPAROSCOPY      LAPAROSCOPY  03/07/2014    Robotic Assisted-Lysis of Adhesions-drainage of ovarian cyst    OTHER SURGICAL HISTORY      procedure for superior mesenteric artery syndrome    OTHER SURGICAL HISTORY  7/30/2015    RIGHT WRIST HARDWARE REMOVAL         MEDICATIONS   No current facility-administered medications for this encounter.    Current Outpatient Medications:     HYDROcodone-acetaminophen (NORCO) 5-325 MG per tablet, Take 1 tablet by mouth in the morning and 1 tablet in the evening. Do all this for 2 days. Intended supply: 3 days. Take lowest dose possible to manage pain. Max Daily Amount: 2 tablets., Disp: 4 tablet, Rfl: 0    sodium-potassium-mag sulfate (SUPREP BOWEL PREP KIT) 17.5-3.13-1.6 GM/177ML SOLN solution, Take 1 kit by mouth See Admin Instructions Use as directed, Disp: 1 each, Rfl: 0    atorvastatin (LIPITOR) 40 MG tablet, Take 1 tablet by mouth daily, Disp: , Rfl:     buPROPion (WELLBUTRIN XL) 300 MG extended release tablet, Take 1 tablet by mouth daily, Disp: , Rfl:     clopidogrel (PLAVIX) 75 MG tablet, Take 1 tablet by mouth daily, Disp: , Rfl:     lisinopril (PRINIVIL;ZESTRIL) 5 MG tablet, Take 1 tablet by mouth daily, Disp: , Rfl:     NURTEC 75 MG TBDP, Take 1 tablet by mouth every 48 hours, Disp: , Rfl:     tiZANidine (ZANAFLEX) 4 MG tablet, Take 1 tablet by mouth in the morning and at bedtime, Disp: , Rfl:     vitamin B-12 (CYANOCOBALAMIN) 500 MCG tablet, Take 1 tablet by mouth Twice a Week, Disp: , Rfl:     escitalopram (LEXAPRO) 20 MG tablet, Take 1 tablet by mouth daily (Patient not taking: Reported on 8/5/2024),  9/6/24 2158)       ED Course as of 09/06/24 2214   Fri Sep 06, 2024   2022 CT ABDOMEN PELVIS W IV CONTRAST Additional Contrast? None [EP]   2128 Consulted Dr. Dahl gastroenterologist regarding the patient's case.  He relates that the colonoscopy was more difficult difficult as the sigmoid colon is more twisted.  He reviewed the CT scan and lab workup.  He recommended the patient be discharged with instructions to use Tylenol for pain and to call his office tomorrow if the pain persist or worsens and also to be given strict return precautions. [EP]   2135 Rechecked patient and she has had moderate improvement of her pain after the fentanyl.  Updated her with results and plan to call Dr. Dahl tomorrow for follow-up.  Discussed plan to discharge with a short course of Norco to be used as needed for pain.  Strict return precautions given.  Instructed her to return if she developed new or worsening abdominal pain, fevers, chills, intractable nausea or vomiting, bloody bowel movements or any other new or worsening symptoms.  Patient understands and agrees with plan.  All questions answered [EP]      ED Course User Index  [EP] Kylie Hubbard MD       PROCEDURES: (None if blank)  Procedures:     CRITICAL CARE:  None    MEDICATION CHANGES     Discharge Medication List as of 9/6/2024  9:53 PM          FINAL DISPOSITION   Shared Decision-Making was performed, disposition discussed with the patient/family and questions answered.    Outpatient follow up (If applicable):  Jorge Dahl MD  [address] [place] [address]  [phone]    Schedule an appointment as soon as possible for a visit       The results of pertinent diagnostic studies and exam findings were discussed with the patient/surrogate. The patient’s provisional diagnosis and plan of care were discussed with the patient and present family who expressed understanding. Medications were reviewed and indications and risks of medications were discussed with  the patient/family present. Strict return precautions and follow up instructions were discussed with the patient prior to discharge, with which the patient agrees.          FINAL DIAGNOSES:  Final diagnoses:   Abdominal pain, unspecified abdominal location       Condition: condition: stable  Dispo: Discharge to home  DISPOSITION Decision To Discharge 09/06/2024 09:54:23 PM  Condition at Disposition: Stable      This transcription was electronically signed. It was dictated by use of voice recognition software and electronically transcribed. The transcription may contain errors not detected in proofreading.

## 2024-09-06 NOTE — ED NOTES
Upon first contact with patient this RN receives bedside shift report from Elisa PAYNE. VS stable. Patient resting on cot. IV fluids infusing. Patient states \"that pain medication the other nurse gave me only helped a little. I'm going to need more than that.\" Patient informed to give medication time to work and will reevaluate pain level shortly.

## 2024-09-07 RX ORDER — OXYCODONE AND ACETAMINOPHEN 5; 325 MG/1; MG/1
1 TABLET ORAL EVERY 8 HOURS PRN
Qty: 4 TABLET | Refills: 0 | Status: ON HOLD | OUTPATIENT
Start: 2024-09-07 | End: 2024-09-08

## 2024-09-07 NOTE — ED NOTES
Patient medicated per MAR. VS stable. IV fluids infusing. Call light in reach. Family at bedside. Patient denies any needs at this time.

## 2024-09-07 NOTE — DISCHARGE INSTRUCTIONS
You were seen in the ED today for abdominal pain after having a colonoscopy.  Your lab workup was reassuring with no signs of infection.  CT of your abdomen/pelvis did not have any perforation in your bowels.  Please call Dr. Dahl tomorrow for follow-up.  Return if you develop new or worsening abdominal pain, intractable nausea or vomiting, fevers, chills, blood in your stool or any other new or worsening symptoms.    If given narcotics (opiates) during this Emergency Department visit, please do not drink, drive or operate any machinery for at least 4 - 6 hours.    Avoid eating any spicy food, milk type products or drinks that have caffeine in it.  Take all medications as prescribed.  For pain use ibuprofen (Motrin) or acetaminophen (Tylenol), unless prescribed medications that have acetaminophen in it.  You can take over the counter acetaminophen tablets (1 - 2 tablets of the 500-mg strength every 6 hours) or ibuprofen tablets (2 tablets every 4 hours).    PLEASE RETURN TO THE EMERGENCY DEPARTMENT IMMEDIATELY for worsening symptoms, or if you develop any concerning symptoms such as: high fever not relieved by acetaminophen (Tylenol) and/or ibuprofen (Motrin), chills, shortness of breath, chest pain, persistent nausea and/or vomiting, numbness, weakness or tingling in the arms or legs or change in color of the extremities, changes in mental status, persistent headache, blurry vision.    Return within 8 - 12 hours if you have any of the following: worsening of pain in your abdomen, no food sounds good to you, you continue to vomit, pain goes to your back, have pain in the abdomen when going over a bump in the car or when you jump up and down, develop vaginal bleeding or discharge, inability to urinate, unable to follow up with your physician, or other any other care or concern.

## 2024-09-08 ENCOUNTER — HOSPITAL ENCOUNTER (INPATIENT)
Age: 39
LOS: 7 days | Discharge: ANOTHER HOSPITAL | DRG: 947 | End: 2024-09-15
Attending: EMERGENCY MEDICINE
Payer: COMMERCIAL

## 2024-09-08 ENCOUNTER — APPOINTMENT (OUTPATIENT)
Dept: GENERAL RADIOLOGY | Age: 39
DRG: 947 | End: 2024-09-08
Payer: COMMERCIAL

## 2024-09-08 DIAGNOSIS — R10.30 LOWER ABDOMINAL PAIN: Primary | ICD-10-CM

## 2024-09-08 PROBLEM — R10.9 ABDOMINAL PAIN: Status: ACTIVE | Noted: 2024-09-08

## 2024-09-08 PROBLEM — Z86.73 HISTORY OF CVA (CEREBROVASCULAR ACCIDENT): Status: ACTIVE | Noted: 2024-09-08

## 2024-09-08 LAB
ALBUMIN SERPL BCG-MCNC: 4.3 G/DL (ref 3.5–5.1)
ALP SERPL-CCNC: 61 U/L (ref 38–126)
ALT SERPL W/O P-5'-P-CCNC: 28 U/L (ref 11–66)
ANION GAP SERPL CALC-SCNC: 9 MEQ/L (ref 8–16)
AST SERPL-CCNC: 18 U/L (ref 5–40)
BASOPHILS ABSOLUTE: 0.1 THOU/MM3 (ref 0–0.1)
BASOPHILS NFR BLD AUTO: 1.2 %
BILIRUB CONJ SERPL-MCNC: < 0.1 MG/DL (ref 0.1–13.8)
BILIRUB SERPL-MCNC: 0.3 MG/DL (ref 0.3–1.2)
BUN SERPL-MCNC: 5 MG/DL (ref 7–22)
CALCIUM SERPL-MCNC: 8.8 MG/DL (ref 8.5–10.5)
CHLORIDE SERPL-SCNC: 103 MEQ/L (ref 98–111)
CO2 SERPL-SCNC: 24 MEQ/L (ref 23–33)
CREAT SERPL-MCNC: 0.8 MG/DL (ref 0.4–1.2)
DEPRECATED RDW RBC AUTO: 39.1 FL (ref 35–45)
EOSINOPHIL NFR BLD AUTO: 0.9 %
EOSINOPHILS ABSOLUTE: 0 THOU/MM3 (ref 0–0.4)
ERYTHROCYTE [DISTWIDTH] IN BLOOD BY AUTOMATED COUNT: 11.5 % (ref 11.5–14.5)
GFR SERPL CREATININE-BSD FRML MDRD: > 90 ML/MIN/1.73M2
GLUCOSE SERPL-MCNC: 110 MG/DL (ref 70–108)
HCT VFR BLD AUTO: 38.5 % (ref 37–47)
HGB BLD-MCNC: 13.1 GM/DL (ref 12–16)
IMM GRANULOCYTES # BLD AUTO: 0.01 THOU/MM3 (ref 0–0.07)
IMM GRANULOCYTES NFR BLD AUTO: 0.2 %
LIPASE SERPL-CCNC: 23.8 U/L (ref 5.6–51.3)
LYMPHOCYTES ABSOLUTE: 1.8 THOU/MM3 (ref 1–4.8)
LYMPHOCYTES NFR BLD AUTO: 40.7 %
MCH RBC QN AUTO: 31.5 PG (ref 26–33)
MCHC RBC AUTO-ENTMCNC: 34 GM/DL (ref 32.2–35.5)
MCV RBC AUTO: 92.5 FL (ref 81–99)
MONOCYTES ABSOLUTE: 0.3 THOU/MM3 (ref 0.4–1.3)
MONOCYTES NFR BLD AUTO: 8 %
NEUTROPHILS ABSOLUTE: 2.1 THOU/MM3 (ref 1.8–7.7)
NEUTROPHILS NFR BLD AUTO: 49 %
NRBC BLD AUTO-RTO: 0 /100 WBC
OSMOLALITY SERPL CALC.SUM OF ELEC: 269.9 MOSMOL/KG (ref 275–300)
PLATELET # BLD AUTO: 238 THOU/MM3 (ref 130–400)
PMV BLD AUTO: 9.3 FL (ref 9.4–12.4)
POTASSIUM SERPL-SCNC: 3.9 MEQ/L (ref 3.5–5.2)
PROT SERPL-MCNC: 6.2 G/DL (ref 6.1–8)
RBC # BLD AUTO: 4.16 MILL/MM3 (ref 4.2–5.4)
SODIUM SERPL-SCNC: 136 MEQ/L (ref 135–145)
WBC # BLD AUTO: 4.3 THOU/MM3 (ref 4.8–10.8)

## 2024-09-08 PROCEDURE — 74018 RADEX ABDOMEN 1 VIEW: CPT

## 2024-09-08 PROCEDURE — 99285 EMERGENCY DEPT VISIT HI MDM: CPT

## 2024-09-08 PROCEDURE — 83690 ASSAY OF LIPASE: CPT

## 2024-09-08 PROCEDURE — 80048 BASIC METABOLIC PNL TOTAL CA: CPT

## 2024-09-08 PROCEDURE — 6360000002 HC RX W HCPCS: Performed by: EMERGENCY MEDICINE

## 2024-09-08 PROCEDURE — 85025 COMPLETE CBC W/AUTO DIFF WBC: CPT

## 2024-09-08 PROCEDURE — 1200000000 HC SEMI PRIVATE

## 2024-09-08 PROCEDURE — 2580000003 HC RX 258: Performed by: INTERNAL MEDICINE

## 2024-09-08 PROCEDURE — 36415 COLL VENOUS BLD VENIPUNCTURE: CPT

## 2024-09-08 PROCEDURE — 80076 HEPATIC FUNCTION PANEL: CPT

## 2024-09-08 PROCEDURE — 6370000000 HC RX 637 (ALT 250 FOR IP): Performed by: INTERNAL MEDICINE

## 2024-09-08 PROCEDURE — 99223 1ST HOSP IP/OBS HIGH 75: CPT | Performed by: INTERNAL MEDICINE

## 2024-09-08 PROCEDURE — 6360000002 HC RX W HCPCS: Performed by: INTERNAL MEDICINE

## 2024-09-08 RX ORDER — VALACYCLOVIR HYDROCHLORIDE 500 MG/1
1000 TABLET, FILM COATED ORAL DAILY
Status: DISCONTINUED | OUTPATIENT
Start: 2024-09-09 | End: 2024-09-15 | Stop reason: HOSPADM

## 2024-09-08 RX ORDER — SODIUM CHLORIDE 0.9 % (FLUSH) 0.9 %
5-40 SYRINGE (ML) INJECTION PRN
Status: DISCONTINUED | OUTPATIENT
Start: 2024-09-08 | End: 2024-09-15 | Stop reason: HOSPADM

## 2024-09-08 RX ORDER — PANTOPRAZOLE SODIUM 40 MG/1
40 TABLET, DELAYED RELEASE ORAL DAILY
Status: DISCONTINUED | OUTPATIENT
Start: 2024-09-09 | End: 2024-09-15 | Stop reason: HOSPADM

## 2024-09-08 RX ORDER — MORPHINE SULFATE 2 MG/ML
2 INJECTION, SOLUTION INTRAMUSCULAR; INTRAVENOUS EVERY 4 HOURS PRN
Status: CANCELLED | OUTPATIENT
Start: 2024-09-08

## 2024-09-08 RX ORDER — ACETAMINOPHEN 325 MG/1
650 TABLET ORAL EVERY 6 HOURS PRN
Status: DISCONTINUED | OUTPATIENT
Start: 2024-09-08 | End: 2024-09-08

## 2024-09-08 RX ORDER — ONDANSETRON 4 MG/1
4 TABLET, ORALLY DISINTEGRATING ORAL EVERY 8 HOURS PRN
Status: DISCONTINUED | OUTPATIENT
Start: 2024-09-08 | End: 2024-09-15 | Stop reason: HOSPADM

## 2024-09-08 RX ORDER — ONDANSETRON 2 MG/ML
4 INJECTION INTRAMUSCULAR; INTRAVENOUS EVERY 6 HOURS PRN
Status: DISCONTINUED | OUTPATIENT
Start: 2024-09-08 | End: 2024-09-15 | Stop reason: HOSPADM

## 2024-09-08 RX ORDER — POTASSIUM CHLORIDE 7.45 MG/ML
10 INJECTION INTRAVENOUS PRN
Status: DISCONTINUED | OUTPATIENT
Start: 2024-09-08 | End: 2024-09-15 | Stop reason: HOSPADM

## 2024-09-08 RX ORDER — POLYETHYLENE GLYCOL 3350 17 G/17G
17 POWDER, FOR SOLUTION ORAL DAILY PRN
Status: DISCONTINUED | OUTPATIENT
Start: 2024-09-08 | End: 2024-09-15 | Stop reason: HOSPADM

## 2024-09-08 RX ORDER — MAGNESIUM SULFATE IN WATER 40 MG/ML
2000 INJECTION, SOLUTION INTRAVENOUS PRN
Status: DISCONTINUED | OUTPATIENT
Start: 2024-09-08 | End: 2024-09-15 | Stop reason: HOSPADM

## 2024-09-08 RX ORDER — OXYCODONE AND ACETAMINOPHEN 5; 325 MG/1; MG/1
1 TABLET ORAL EVERY 4 HOURS PRN
Status: DISCONTINUED | OUTPATIENT
Start: 2024-09-08 | End: 2024-09-09

## 2024-09-08 RX ORDER — SODIUM CHLORIDE 9 MG/ML
INJECTION, SOLUTION INTRAVENOUS PRN
Status: DISCONTINUED | OUTPATIENT
Start: 2024-09-08 | End: 2024-09-15 | Stop reason: HOSPADM

## 2024-09-08 RX ORDER — SODIUM CHLORIDE 0.9 % (FLUSH) 0.9 %
5-40 SYRINGE (ML) INJECTION EVERY 12 HOURS SCHEDULED
Status: DISCONTINUED | OUTPATIENT
Start: 2024-09-08 | End: 2024-09-15 | Stop reason: HOSPADM

## 2024-09-08 RX ORDER — ACETAMINOPHEN 650 MG/1
650 SUPPOSITORY RECTAL EVERY 6 HOURS PRN
Status: DISCONTINUED | OUTPATIENT
Start: 2024-09-08 | End: 2024-09-08

## 2024-09-08 RX ORDER — LISINOPRIL 5 MG/1
5 TABLET ORAL DAILY
Status: DISCONTINUED | OUTPATIENT
Start: 2024-09-09 | End: 2024-09-15 | Stop reason: HOSPADM

## 2024-09-08 RX ORDER — BUPROPION HYDROCHLORIDE 300 MG/1
300 TABLET ORAL EVERY OTHER DAY
Status: DISCONTINUED | OUTPATIENT
Start: 2024-09-10 | End: 2024-09-15 | Stop reason: HOSPADM

## 2024-09-08 RX ORDER — ATORVASTATIN CALCIUM 40 MG/1
40 TABLET, FILM COATED ORAL DAILY
Status: DISCONTINUED | OUTPATIENT
Start: 2024-09-09 | End: 2024-09-15 | Stop reason: HOSPADM

## 2024-09-08 RX ORDER — POTASSIUM CHLORIDE 1500 MG/1
40 TABLET, EXTENDED RELEASE ORAL PRN
Status: DISCONTINUED | OUTPATIENT
Start: 2024-09-08 | End: 2024-09-15 | Stop reason: HOSPADM

## 2024-09-08 RX ORDER — ENOXAPARIN SODIUM 100 MG/ML
30 INJECTION SUBCUTANEOUS DAILY
Status: DISCONTINUED | OUTPATIENT
Start: 2024-09-08 | End: 2024-09-15 | Stop reason: HOSPADM

## 2024-09-08 RX ORDER — CLOPIDOGREL BISULFATE 75 MG/1
75 TABLET ORAL DAILY
Status: DISCONTINUED | OUTPATIENT
Start: 2024-09-09 | End: 2024-09-15 | Stop reason: HOSPADM

## 2024-09-08 RX ADMIN — HYDROMORPHONE HYDROCHLORIDE 0.5 MG: 1 INJECTION, SOLUTION INTRAMUSCULAR; INTRAVENOUS; SUBCUTANEOUS at 19:06

## 2024-09-08 RX ADMIN — HYDROMORPHONE HYDROCHLORIDE 0.5 MG: 1 INJECTION, SOLUTION INTRAMUSCULAR; INTRAVENOUS; SUBCUTANEOUS at 22:08

## 2024-09-08 RX ADMIN — OXYCODONE HYDROCHLORIDE AND ACETAMINOPHEN 1 TABLET: 5; 325 TABLET ORAL at 23:14

## 2024-09-08 RX ADMIN — SODIUM CHLORIDE, PRESERVATIVE FREE 10 ML: 5 INJECTION INTRAVENOUS at 19:49

## 2024-09-08 RX ADMIN — OXYCODONE HYDROCHLORIDE AND ACETAMINOPHEN 1 TABLET: 5; 325 TABLET ORAL at 17:57

## 2024-09-08 RX ADMIN — ONDANSETRON 4 MG: 4 TABLET, ORALLY DISINTEGRATING ORAL at 23:14

## 2024-09-08 RX ADMIN — HYDROMORPHONE HYDROCHLORIDE 0.5 MG: 1 INJECTION, SOLUTION INTRAMUSCULAR; INTRAVENOUS; SUBCUTANEOUS at 15:59

## 2024-09-08 ASSESSMENT — PAIN SCALES - GENERAL
PAINLEVEL_OUTOF10: 9
PAINLEVEL_OUTOF10: 7
PAINLEVEL_OUTOF10: 9
PAINLEVEL_OUTOF10: 8

## 2024-09-08 ASSESSMENT — PAIN DESCRIPTION - LOCATION
LOCATION: ABDOMEN

## 2024-09-08 ASSESSMENT — PAIN DESCRIPTION - ONSET
ONSET: ON-GOING
ONSET: ON-GOING

## 2024-09-08 ASSESSMENT — LIFESTYLE VARIABLES
HOW OFTEN DO YOU HAVE A DRINK CONTAINING ALCOHOL: NEVER
HOW MANY STANDARD DRINKS CONTAINING ALCOHOL DO YOU HAVE ON A TYPICAL DAY: PATIENT DOES NOT DRINK

## 2024-09-08 ASSESSMENT — PAIN DESCRIPTION - DESCRIPTORS
DESCRIPTORS: STABBING;SHARP
DESCRIPTORS: STABBING

## 2024-09-08 ASSESSMENT — PAIN - FUNCTIONAL ASSESSMENT
PAIN_FUNCTIONAL_ASSESSMENT: ACTIVITIES ARE NOT PREVENTED
PAIN_FUNCTIONAL_ASSESSMENT: PREVENTS OR INTERFERES SOME ACTIVE ACTIVITIES AND ADLS
PAIN_FUNCTIONAL_ASSESSMENT: PREVENTS OR INTERFERES SOME ACTIVE ACTIVITIES AND ADLS
PAIN_FUNCTIONAL_ASSESSMENT: ACTIVITIES ARE NOT PREVENTED
PAIN_FUNCTIONAL_ASSESSMENT: 0-10

## 2024-09-08 ASSESSMENT — PAIN DESCRIPTION - PAIN TYPE
TYPE: ACUTE PAIN
TYPE: ACUTE PAIN

## 2024-09-08 ASSESSMENT — PAIN DESCRIPTION - ORIENTATION
ORIENTATION: LEFT
ORIENTATION: LEFT
ORIENTATION: LEFT;RIGHT
ORIENTATION: LEFT
ORIENTATION: LEFT

## 2024-09-08 ASSESSMENT — PAIN DESCRIPTION - FREQUENCY
FREQUENCY: CONTINUOUS
FREQUENCY: CONTINUOUS

## 2024-09-08 ASSESSMENT — PAIN DESCRIPTION - DIRECTION: RADIATING_TOWARDS: RIGHT ABDOMEN

## 2024-09-09 ENCOUNTER — APPOINTMENT (OUTPATIENT)
Dept: CT IMAGING | Age: 39
DRG: 947 | End: 2024-09-09
Payer: COMMERCIAL

## 2024-09-09 ENCOUNTER — APPOINTMENT (OUTPATIENT)
Dept: GENERAL RADIOLOGY | Age: 39
DRG: 947 | End: 2024-09-09
Payer: COMMERCIAL

## 2024-09-09 LAB
ALBUMIN SERPL BCG-MCNC: 4 G/DL (ref 3.5–5.1)
ALP SERPL-CCNC: 54 U/L (ref 38–126)
ALT SERPL W/O P-5'-P-CCNC: 25 U/L (ref 11–66)
ANION GAP SERPL CALC-SCNC: 11 MEQ/L (ref 8–16)
AST SERPL-CCNC: 15 U/L (ref 5–40)
BASOPHILS ABSOLUTE: 0.1 THOU/MM3 (ref 0–0.1)
BASOPHILS NFR BLD AUTO: 0.8 %
BILIRUB SERPL-MCNC: 0.4 MG/DL (ref 0.3–1.2)
BUN SERPL-MCNC: 7 MG/DL (ref 7–22)
CALCIUM SERPL-MCNC: 8.6 MG/DL (ref 8.5–10.5)
CHLORIDE SERPL-SCNC: 105 MEQ/L (ref 98–111)
CO2 SERPL-SCNC: 21 MEQ/L (ref 23–33)
CREAT SERPL-MCNC: 0.6 MG/DL (ref 0.4–1.2)
DEPRECATED RDW RBC AUTO: 39.1 FL (ref 35–45)
EOSINOPHIL NFR BLD AUTO: 1.1 %
EOSINOPHILS ABSOLUTE: 0.1 THOU/MM3 (ref 0–0.4)
ERYTHROCYTE [DISTWIDTH] IN BLOOD BY AUTOMATED COUNT: 11.5 % (ref 11.5–14.5)
GFR SERPL CREATININE-BSD FRML MDRD: > 90 ML/MIN/1.73M2
GLUCOSE BLD STRIP.AUTO-MCNC: 118 MG/DL (ref 70–108)
GLUCOSE SERPL-MCNC: 109 MG/DL (ref 70–108)
HCT VFR BLD AUTO: 35.2 % (ref 37–47)
HGB BLD-MCNC: 12 GM/DL (ref 12–16)
IMM GRANULOCYTES # BLD AUTO: 0.02 THOU/MM3 (ref 0–0.07)
IMM GRANULOCYTES NFR BLD AUTO: 0.3 %
LACTATE SERPL-SCNC: 0.6 MMOL/L (ref 0.5–2)
LYMPHOCYTES ABSOLUTE: 2.3 THOU/MM3 (ref 1–4.8)
LYMPHOCYTES NFR BLD AUTO: 36.1 %
MCH RBC QN AUTO: 31.6 PG (ref 26–33)
MCHC RBC AUTO-ENTMCNC: 34.1 GM/DL (ref 32.2–35.5)
MCV RBC AUTO: 92.6 FL (ref 81–99)
MONOCYTES ABSOLUTE: 0.5 THOU/MM3 (ref 0.4–1.3)
MONOCYTES NFR BLD AUTO: 8.1 %
NEUTROPHILS ABSOLUTE: 3.4 THOU/MM3 (ref 1.8–7.7)
NEUTROPHILS NFR BLD AUTO: 53.6 %
NRBC BLD AUTO-RTO: 0 /100 WBC
PLATELET # BLD AUTO: 230 THOU/MM3 (ref 130–400)
PMV BLD AUTO: 9.5 FL (ref 9.4–12.4)
POTASSIUM SERPL-SCNC: 4.1 MEQ/L (ref 3.5–5.2)
PROT SERPL-MCNC: 6 G/DL (ref 6.1–8)
RBC # BLD AUTO: 3.8 MILL/MM3 (ref 4.2–5.4)
SODIUM SERPL-SCNC: 137 MEQ/L (ref 135–145)
WBC # BLD AUTO: 6.3 THOU/MM3 (ref 4.8–10.8)

## 2024-09-09 PROCEDURE — 6370000000 HC RX 637 (ALT 250 FOR IP): Performed by: NURSE PRACTITIONER

## 2024-09-09 PROCEDURE — 83605 ASSAY OF LACTIC ACID: CPT

## 2024-09-09 PROCEDURE — 6360000002 HC RX W HCPCS: Performed by: INTERNAL MEDICINE

## 2024-09-09 PROCEDURE — 2580000003 HC RX 258: Performed by: INTERNAL MEDICINE

## 2024-09-09 PROCEDURE — 82948 REAGENT STRIP/BLOOD GLUCOSE: CPT

## 2024-09-09 PROCEDURE — 6370000000 HC RX 637 (ALT 250 FOR IP): Performed by: INTERNAL MEDICINE

## 2024-09-09 PROCEDURE — 99232 SBSQ HOSP IP/OBS MODERATE 35: CPT

## 2024-09-09 PROCEDURE — 6360000002 HC RX W HCPCS: Performed by: NURSE PRACTITIONER

## 2024-09-09 PROCEDURE — 74018 RADEX ABDOMEN 1 VIEW: CPT

## 2024-09-09 PROCEDURE — 2580000003 HC RX 258: Performed by: PHYSICIAN ASSISTANT

## 2024-09-09 PROCEDURE — 2580000003 HC RX 258

## 2024-09-09 PROCEDURE — 80053 COMPREHEN METABOLIC PANEL: CPT

## 2024-09-09 PROCEDURE — 85025 COMPLETE CBC W/AUTO DIFF WBC: CPT

## 2024-09-09 PROCEDURE — 1200000000 HC SEMI PRIVATE

## 2024-09-09 PROCEDURE — 36415 COLL VENOUS BLD VENIPUNCTURE: CPT

## 2024-09-09 PROCEDURE — 74178 CT ABD&PLV WO CNTR FLWD CNTR: CPT

## 2024-09-09 PROCEDURE — 6360000004 HC RX CONTRAST MEDICATION

## 2024-09-09 RX ORDER — IOPAMIDOL 755 MG/ML
80 INJECTION, SOLUTION INTRAVASCULAR
Status: COMPLETED | OUTPATIENT
Start: 2024-09-09 | End: 2024-09-09

## 2024-09-09 RX ORDER — SODIUM CHLORIDE, SODIUM LACTATE, POTASSIUM CHLORIDE, AND CALCIUM CHLORIDE .6; .31; .03; .02 G/100ML; G/100ML; G/100ML; G/100ML
500 INJECTION, SOLUTION INTRAVENOUS ONCE
Status: COMPLETED | OUTPATIENT
Start: 2024-09-09 | End: 2024-09-09

## 2024-09-09 RX ORDER — SODIUM CHLORIDE, SODIUM LACTATE, POTASSIUM CHLORIDE, CALCIUM CHLORIDE 600; 310; 30; 20 MG/100ML; MG/100ML; MG/100ML; MG/100ML
INJECTION, SOLUTION INTRAVENOUS CONTINUOUS
Status: ACTIVE | OUTPATIENT
Start: 2024-09-09 | End: 2024-09-09

## 2024-09-09 RX ORDER — OXYCODONE HYDROCHLORIDE 5 MG/1
5 TABLET ORAL EVERY 4 HOURS PRN
Status: DISCONTINUED | OUTPATIENT
Start: 2024-09-09 | End: 2024-09-10

## 2024-09-09 RX ADMIN — VALACYCLOVIR HYDROCHLORIDE 1000 MG: 500 TABLET, FILM COATED ORAL at 09:03

## 2024-09-09 RX ADMIN — HYDROMORPHONE HYDROCHLORIDE 0.5 MG: 1 INJECTION, SOLUTION INTRAMUSCULAR; INTRAVENOUS; SUBCUTANEOUS at 06:41

## 2024-09-09 RX ADMIN — HYDROMORPHONE HYDROCHLORIDE 0.5 MG: 1 INJECTION, SOLUTION INTRAMUSCULAR; INTRAVENOUS; SUBCUTANEOUS at 01:21

## 2024-09-09 RX ADMIN — ENOXAPARIN SODIUM 30 MG: 100 INJECTION SUBCUTANEOUS at 09:04

## 2024-09-09 RX ADMIN — ONDANSETRON 4 MG: 2 INJECTION INTRAMUSCULAR; INTRAVENOUS at 14:36

## 2024-09-09 RX ADMIN — IOPAMIDOL 80 ML: 755 INJECTION, SOLUTION INTRAVENOUS at 10:09

## 2024-09-09 RX ADMIN — OXYCODONE HYDROCHLORIDE 5 MG: 5 TABLET ORAL at 09:47

## 2024-09-09 RX ADMIN — SODIUM CHLORIDE, PRESERVATIVE FREE 10 ML: 5 INJECTION INTRAVENOUS at 09:08

## 2024-09-09 RX ADMIN — HYDROMORPHONE HYDROCHLORIDE 0.5 MG: 1 INJECTION, SOLUTION INTRAMUSCULAR; INTRAVENOUS; SUBCUTANEOUS at 17:00

## 2024-09-09 RX ADMIN — SODIUM CHLORIDE, POTASSIUM CHLORIDE, SODIUM LACTATE AND CALCIUM CHLORIDE: 600; 310; 30; 20 INJECTION, SOLUTION INTRAVENOUS at 09:42

## 2024-09-09 RX ADMIN — ATORVASTATIN CALCIUM 40 MG: 40 TABLET, FILM COATED ORAL at 09:04

## 2024-09-09 RX ADMIN — CLOPIDOGREL BISULFATE 75 MG: 75 TABLET ORAL at 09:03

## 2024-09-09 RX ADMIN — ONDANSETRON 4 MG: 2 INJECTION INTRAMUSCULAR; INTRAVENOUS at 21:48

## 2024-09-09 RX ADMIN — PANTOPRAZOLE SODIUM 40 MG: 40 TABLET, DELAYED RELEASE ORAL at 09:03

## 2024-09-09 RX ADMIN — ONDANSETRON 4 MG: 2 INJECTION INTRAMUSCULAR; INTRAVENOUS at 08:03

## 2024-09-09 RX ADMIN — HYDROMORPHONE HYDROCHLORIDE 0.5 MG: 1 INJECTION, SOLUTION INTRAMUSCULAR; INTRAVENOUS; SUBCUTANEOUS at 23:03

## 2024-09-09 RX ADMIN — HYDROMORPHONE HYDROCHLORIDE 0.5 MG: 1 INJECTION, SOLUTION INTRAMUSCULAR; INTRAVENOUS; SUBCUTANEOUS at 08:04

## 2024-09-09 RX ADMIN — OXYCODONE HYDROCHLORIDE 5 MG: 5 TABLET ORAL at 19:19

## 2024-09-09 RX ADMIN — HYDROMORPHONE HYDROCHLORIDE 0.5 MG: 1 INJECTION, SOLUTION INTRAMUSCULAR; INTRAVENOUS; SUBCUTANEOUS at 04:29

## 2024-09-09 RX ADMIN — OXYCODONE HYDROCHLORIDE AND ACETAMINOPHEN 1 TABLET: 5; 325 TABLET ORAL at 03:28

## 2024-09-09 RX ADMIN — SODIUM CHLORIDE, POTASSIUM CHLORIDE, SODIUM LACTATE AND CALCIUM CHLORIDE 500 ML: 600; 310; 30; 20 INJECTION, SOLUTION INTRAVENOUS at 04:04

## 2024-09-09 RX ADMIN — OXYCODONE HYDROCHLORIDE 5 MG: 5 TABLET ORAL at 14:36

## 2024-09-09 RX ADMIN — HYDROMORPHONE HYDROCHLORIDE 0.5 MG: 1 INJECTION, SOLUTION INTRAMUSCULAR; INTRAVENOUS; SUBCUTANEOUS at 13:13

## 2024-09-09 ASSESSMENT — PAIN DESCRIPTION - ORIENTATION
ORIENTATION: LOWER;LEFT
ORIENTATION: LEFT
ORIENTATION: LOWER;LEFT
ORIENTATION: LEFT
ORIENTATION: LEFT
ORIENTATION: LOWER;LEFT
ORIENTATION: LEFT;LOWER
ORIENTATION: LEFT
ORIENTATION: LEFT
ORIENTATION: LOWER;LEFT
ORIENTATION: LOWER;LEFT

## 2024-09-09 ASSESSMENT — PAIN SCALES - GENERAL
PAINLEVEL_OUTOF10: 6
PAINLEVEL_OUTOF10: 3
PAINLEVEL_OUTOF10: 6
PAINLEVEL_OUTOF10: 8
PAINLEVEL_OUTOF10: 8
PAINLEVEL_OUTOF10: 7
PAINLEVEL_OUTOF10: 6
PAINLEVEL_OUTOF10: 8
PAINLEVEL_OUTOF10: 7
PAINLEVEL_OUTOF10: 0
PAINLEVEL_OUTOF10: 8
PAINLEVEL_OUTOF10: 8
PAINLEVEL_OUTOF10: 6
PAINLEVEL_OUTOF10: 0
PAINLEVEL_OUTOF10: 8
PAINLEVEL_OUTOF10: 7
PAINLEVEL_OUTOF10: 3

## 2024-09-09 ASSESSMENT — PAIN - FUNCTIONAL ASSESSMENT

## 2024-09-09 ASSESSMENT — PAIN DESCRIPTION - DESCRIPTORS
DESCRIPTORS: SHARP
DESCRIPTORS: STABBING
DESCRIPTORS: STABBING
DESCRIPTORS: SHARP
DESCRIPTORS: SHARP
DESCRIPTORS: STABBING
DESCRIPTORS: STABBING
DESCRIPTORS: SHARP
DESCRIPTORS: ACHING
DESCRIPTORS: SHARP
DESCRIPTORS: SHARP

## 2024-09-09 ASSESSMENT — PAIN DESCRIPTION - LOCATION
LOCATION: ABDOMEN

## 2024-09-10 ENCOUNTER — APPOINTMENT (OUTPATIENT)
Dept: CT IMAGING | Age: 39
DRG: 947 | End: 2024-09-10
Payer: COMMERCIAL

## 2024-09-10 PROBLEM — E43 SEVERE MALNUTRITION (HCC): Status: ACTIVE | Noted: 2024-09-10

## 2024-09-10 LAB
ALBUMIN SERPL BCG-MCNC: 3.7 G/DL (ref 3.5–5.1)
ALP SERPL-CCNC: 55 U/L (ref 38–126)
ALT SERPL W/O P-5'-P-CCNC: 23 U/L (ref 11–66)
ANION GAP SERPL CALC-SCNC: 9 MEQ/L (ref 8–16)
AST SERPL-CCNC: 15 U/L (ref 5–40)
BASOPHILS ABSOLUTE: 0 THOU/MM3 (ref 0–0.1)
BASOPHILS NFR BLD AUTO: 1.1 %
BILIRUB SERPL-MCNC: 0.3 MG/DL (ref 0.3–1.2)
BUN SERPL-MCNC: 5 MG/DL (ref 7–22)
CALCIUM SERPL-MCNC: 8.5 MG/DL (ref 8.5–10.5)
CHLORIDE SERPL-SCNC: 110 MEQ/L (ref 98–111)
CO2 SERPL-SCNC: 22 MEQ/L (ref 23–33)
CREAT SERPL-MCNC: 0.7 MG/DL (ref 0.4–1.2)
CRP SERPL-MCNC: < 0.3 MG/DL (ref 0–1)
DEPRECATED RDW RBC AUTO: 39.1 FL (ref 35–45)
EOSINOPHIL NFR BLD AUTO: 1.7 %
EOSINOPHILS ABSOLUTE: 0.1 THOU/MM3 (ref 0–0.4)
ERYTHROCYTE [DISTWIDTH] IN BLOOD BY AUTOMATED COUNT: 11.6 % (ref 11.5–14.5)
GFR SERPL CREATININE-BSD FRML MDRD: > 90 ML/MIN/1.73M2
GLUCOSE SERPL-MCNC: 107 MG/DL (ref 70–108)
HCT VFR BLD AUTO: 32.6 % (ref 37–47)
HGB BLD-MCNC: 11.4 GM/DL (ref 12–16)
IMM GRANULOCYTES # BLD AUTO: 0 THOU/MM3 (ref 0–0.07)
IMM GRANULOCYTES NFR BLD AUTO: 0 %
LYMPHOCYTES ABSOLUTE: 1.6 THOU/MM3 (ref 1–4.8)
LYMPHOCYTES NFR BLD AUTO: 46 %
MCH RBC QN AUTO: 32.1 PG (ref 26–33)
MCHC RBC AUTO-ENTMCNC: 35 GM/DL (ref 32.2–35.5)
MCV RBC AUTO: 91.8 FL (ref 81–99)
MONOCYTES ABSOLUTE: 0.3 THOU/MM3 (ref 0.4–1.3)
MONOCYTES NFR BLD AUTO: 9.7 %
NEUTROPHILS ABSOLUTE: 1.5 THOU/MM3 (ref 1.8–7.7)
NEUTROPHILS NFR BLD AUTO: 41.5 %
NRBC BLD AUTO-RTO: 0 /100 WBC
PLATELET # BLD AUTO: 185 THOU/MM3 (ref 130–400)
PMV BLD AUTO: 9.5 FL (ref 9.4–12.4)
POTASSIUM SERPL-SCNC: 4.1 MEQ/L (ref 3.5–5.2)
PROT SERPL-MCNC: 5.5 G/DL (ref 6.1–8)
RBC # BLD AUTO: 3.55 MILL/MM3 (ref 4.2–5.4)
SODIUM SERPL-SCNC: 141 MEQ/L (ref 135–145)
WBC # BLD AUTO: 3.5 THOU/MM3 (ref 4.8–10.8)

## 2024-09-10 PROCEDURE — 74177 CT ABD & PELVIS W/CONTRAST: CPT

## 2024-09-10 PROCEDURE — APPSS45 APP SPLIT SHARED TIME 31-45 MINUTES: Performed by: NURSE PRACTITIONER

## 2024-09-10 PROCEDURE — 6370000000 HC RX 637 (ALT 250 FOR IP)

## 2024-09-10 PROCEDURE — 85025 COMPLETE CBC W/AUTO DIFF WBC: CPT

## 2024-09-10 PROCEDURE — 6360000002 HC RX W HCPCS: Performed by: NURSE PRACTITIONER

## 2024-09-10 PROCEDURE — 6360000004 HC RX CONTRAST MEDICATION

## 2024-09-10 PROCEDURE — 2580000003 HC RX 258

## 2024-09-10 PROCEDURE — 99232 SBSQ HOSP IP/OBS MODERATE 35: CPT

## 2024-09-10 PROCEDURE — 6360000002 HC RX W HCPCS: Performed by: INTERNAL MEDICINE

## 2024-09-10 PROCEDURE — 6360000002 HC RX W HCPCS

## 2024-09-10 PROCEDURE — 80053 COMPREHEN METABOLIC PANEL: CPT

## 2024-09-10 PROCEDURE — 99222 1ST HOSP IP/OBS MODERATE 55: CPT | Performed by: SURGERY

## 2024-09-10 PROCEDURE — 6370000000 HC RX 637 (ALT 250 FOR IP): Performed by: INTERNAL MEDICINE

## 2024-09-10 PROCEDURE — 36415 COLL VENOUS BLD VENIPUNCTURE: CPT

## 2024-09-10 PROCEDURE — 51798 US URINE CAPACITY MEASURE: CPT

## 2024-09-10 PROCEDURE — 2580000003 HC RX 258: Performed by: INTERNAL MEDICINE

## 2024-09-10 PROCEDURE — 1200000000 HC SEMI PRIVATE

## 2024-09-10 PROCEDURE — 86140 C-REACTIVE PROTEIN: CPT

## 2024-09-10 RX ORDER — IOPAMIDOL 755 MG/ML
80 INJECTION, SOLUTION INTRAVASCULAR
Status: COMPLETED | OUTPATIENT
Start: 2024-09-10 | End: 2024-09-10

## 2024-09-10 RX ORDER — MORPHINE SULFATE 2 MG/ML
2 INJECTION, SOLUTION INTRAMUSCULAR; INTRAVENOUS
Status: DISCONTINUED | OUTPATIENT
Start: 2024-09-10 | End: 2024-09-15 | Stop reason: HOSPADM

## 2024-09-10 RX ORDER — MORPHINE SULFATE 4 MG/ML
4 INJECTION, SOLUTION INTRAMUSCULAR; INTRAVENOUS
Status: DISCONTINUED | OUTPATIENT
Start: 2024-09-10 | End: 2024-09-15 | Stop reason: HOSPADM

## 2024-09-10 RX ORDER — OXYCODONE HYDROCHLORIDE 5 MG/1
10 TABLET ORAL EVERY 4 HOURS PRN
Status: DISCONTINUED | OUTPATIENT
Start: 2024-09-10 | End: 2024-09-15 | Stop reason: HOSPADM

## 2024-09-10 RX ORDER — SODIUM CHLORIDE, SODIUM LACTATE, POTASSIUM CHLORIDE, CALCIUM CHLORIDE 600; 310; 30; 20 MG/100ML; MG/100ML; MG/100ML; MG/100ML
INJECTION, SOLUTION INTRAVENOUS CONTINUOUS
Status: ACTIVE | OUTPATIENT
Start: 2024-09-10 | End: 2024-09-11

## 2024-09-10 RX ORDER — OXYCODONE HYDROCHLORIDE 5 MG/1
5 TABLET ORAL EVERY 4 HOURS PRN
Status: DISCONTINUED | OUTPATIENT
Start: 2024-09-10 | End: 2024-09-15 | Stop reason: HOSPADM

## 2024-09-10 RX ADMIN — OXYCODONE HYDROCHLORIDE 10 MG: 5 TABLET ORAL at 21:20

## 2024-09-10 RX ADMIN — IOPAMIDOL 80 ML: 755 INJECTION, SOLUTION INTRAVENOUS at 17:54

## 2024-09-10 RX ADMIN — ONDANSETRON 4 MG: 2 INJECTION INTRAMUSCULAR; INTRAVENOUS at 05:17

## 2024-09-10 RX ADMIN — MORPHINE SULFATE 4 MG: 4 INJECTION, SOLUTION INTRAMUSCULAR; INTRAVENOUS at 11:41

## 2024-09-10 RX ADMIN — MORPHINE SULFATE 4 MG: 4 INJECTION, SOLUTION INTRAMUSCULAR; INTRAVENOUS at 22:24

## 2024-09-10 RX ADMIN — MORPHINE SULFATE 4 MG: 4 INJECTION, SOLUTION INTRAMUSCULAR; INTRAVENOUS at 19:44

## 2024-09-10 RX ADMIN — HYDROMORPHONE HYDROCHLORIDE 0.5 MG: 1 INJECTION, SOLUTION INTRAMUSCULAR; INTRAVENOUS; SUBCUTANEOUS at 05:17

## 2024-09-10 RX ADMIN — ONDANSETRON 4 MG: 2 INJECTION INTRAMUSCULAR; INTRAVENOUS at 21:20

## 2024-09-10 RX ADMIN — ATORVASTATIN CALCIUM 40 MG: 40 TABLET, FILM COATED ORAL at 07:45

## 2024-09-10 RX ADMIN — CLOPIDOGREL BISULFATE 75 MG: 75 TABLET ORAL at 07:45

## 2024-09-10 RX ADMIN — MORPHINE SULFATE 4 MG: 4 INJECTION, SOLUTION INTRAMUSCULAR; INTRAVENOUS at 09:20

## 2024-09-10 RX ADMIN — BUPROPION HYDROCHLORIDE 300 MG: 300 TABLET, EXTENDED RELEASE ORAL at 07:45

## 2024-09-10 RX ADMIN — MORPHINE SULFATE 4 MG: 4 INJECTION, SOLUTION INTRAMUSCULAR; INTRAVENOUS at 17:24

## 2024-09-10 RX ADMIN — ONDANSETRON 4 MG: 2 INJECTION INTRAMUSCULAR; INTRAVENOUS at 14:30

## 2024-09-10 RX ADMIN — SODIUM CHLORIDE, POTASSIUM CHLORIDE, SODIUM LACTATE AND CALCIUM CHLORIDE: 600; 310; 30; 20 INJECTION, SOLUTION INTRAVENOUS at 10:46

## 2024-09-10 RX ADMIN — SODIUM CHLORIDE, PRESERVATIVE FREE 10 ML: 5 INJECTION INTRAVENOUS at 19:44

## 2024-09-10 RX ADMIN — ENOXAPARIN SODIUM 30 MG: 100 INJECTION SUBCUTANEOUS at 07:45

## 2024-09-10 RX ADMIN — MORPHINE SULFATE 4 MG: 4 INJECTION, SOLUTION INTRAMUSCULAR; INTRAVENOUS at 14:30

## 2024-09-10 RX ADMIN — PANTOPRAZOLE SODIUM 40 MG: 40 TABLET, DELAYED RELEASE ORAL at 07:45

## 2024-09-10 RX ADMIN — VALACYCLOVIR HYDROCHLORIDE 1000 MG: 500 TABLET, FILM COATED ORAL at 07:45

## 2024-09-10 RX ADMIN — OXYCODONE HYDROCHLORIDE 10 MG: 5 TABLET ORAL at 16:05

## 2024-09-10 ASSESSMENT — PAIN SCALES - GENERAL
PAINLEVEL_OUTOF10: 7
PAINLEVEL_OUTOF10: 9
PAINLEVEL_OUTOF10: 7
PAINLEVEL_OUTOF10: 9
PAINLEVEL_OUTOF10: 7
PAINLEVEL_OUTOF10: 8
PAINLEVEL_OUTOF10: 5

## 2024-09-10 ASSESSMENT — PAIN DESCRIPTION - LOCATION
LOCATION: ABDOMEN

## 2024-09-10 ASSESSMENT — PAIN DESCRIPTION - DESCRIPTORS
DESCRIPTORS: ACHING;SHARP;DISCOMFORT
DESCRIPTORS: ACHING;SHARP
DESCRIPTORS: ACHING
DESCRIPTORS: ACHING;SHARP;DISCOMFORT
DESCRIPTORS: ACHING;SHARP;SPASM
DESCRIPTORS: ACHING;SHARP
DESCRIPTORS: ACHING;SHARP;DISCOMFORT
DESCRIPTORS: CRAMPING

## 2024-09-10 ASSESSMENT — PAIN DESCRIPTION - ORIENTATION
ORIENTATION: LEFT
ORIENTATION: LOWER
ORIENTATION: MID
ORIENTATION: LOWER

## 2024-09-10 ASSESSMENT — PAIN - FUNCTIONAL ASSESSMENT
PAIN_FUNCTIONAL_ASSESSMENT: ACTIVITIES ARE NOT PREVENTED

## 2024-09-11 ENCOUNTER — APPOINTMENT (OUTPATIENT)
Dept: GENERAL RADIOLOGY | Age: 39
DRG: 947 | End: 2024-09-11
Payer: COMMERCIAL

## 2024-09-11 PROBLEM — R50.9 LOW GRADE FEVER: Status: ACTIVE | Noted: 2024-09-11

## 2024-09-11 PROBLEM — F41.9 ANXIETY: Status: ACTIVE | Noted: 2024-09-11

## 2024-09-11 PROBLEM — I95.9 HYPOTENSION: Status: ACTIVE | Noted: 2024-09-11

## 2024-09-11 LAB
ANION GAP SERPL CALC-SCNC: 11 MEQ/L (ref 8–16)
BASOPHILS ABSOLUTE: 0 THOU/MM3 (ref 0–0.1)
BASOPHILS NFR BLD AUTO: 1.1 %
BUN SERPL-MCNC: 4 MG/DL (ref 7–22)
CALCIUM SERPL-MCNC: 8.5 MG/DL (ref 8.5–10.5)
CHLORIDE SERPL-SCNC: 105 MEQ/L (ref 98–111)
CO2 SERPL-SCNC: 24 MEQ/L (ref 23–33)
CREAT SERPL-MCNC: 0.7 MG/DL (ref 0.4–1.2)
DEPRECATED RDW RBC AUTO: 39.2 FL (ref 35–45)
EOSINOPHIL NFR BLD AUTO: 3.1 %
EOSINOPHILS ABSOLUTE: 0.1 THOU/MM3 (ref 0–0.4)
ERYTHROCYTE [DISTWIDTH] IN BLOOD BY AUTOMATED COUNT: 11.6 % (ref 11.5–14.5)
GFR SERPL CREATININE-BSD FRML MDRD: > 90 ML/MIN/1.73M2
GLUCOSE SERPL-MCNC: 75 MG/DL (ref 70–108)
HCT VFR BLD AUTO: 33.7 % (ref 37–47)
HGB BLD-MCNC: 11.6 GM/DL (ref 12–16)
IMM GRANULOCYTES # BLD AUTO: 0.01 THOU/MM3 (ref 0–0.07)
IMM GRANULOCYTES NFR BLD AUTO: 0.3 %
LYMPHOCYTES ABSOLUTE: 1.2 THOU/MM3 (ref 1–4.8)
LYMPHOCYTES NFR BLD AUTO: 34.5 %
MCH RBC QN AUTO: 31.6 PG (ref 26–33)
MCHC RBC AUTO-ENTMCNC: 34.4 GM/DL (ref 32.2–35.5)
MCV RBC AUTO: 91.8 FL (ref 81–99)
MONOCYTES ABSOLUTE: 0.4 THOU/MM3 (ref 0.4–1.3)
MONOCYTES NFR BLD AUTO: 9.8 %
NEUTROPHILS ABSOLUTE: 1.8 THOU/MM3 (ref 1.8–7.7)
NEUTROPHILS NFR BLD AUTO: 51.2 %
NRBC BLD AUTO-RTO: 0 /100 WBC
PLATELET # BLD AUTO: 188 THOU/MM3 (ref 130–400)
PMV BLD AUTO: 9.5 FL (ref 9.4–12.4)
POTASSIUM SERPL-SCNC: 4 MEQ/L (ref 3.5–5.2)
RBC # BLD AUTO: 3.67 MILL/MM3 (ref 4.2–5.4)
SODIUM SERPL-SCNC: 140 MEQ/L (ref 135–145)
WBC # BLD AUTO: 3.6 THOU/MM3 (ref 4.8–10.8)

## 2024-09-11 PROCEDURE — 87040 BLOOD CULTURE FOR BACTERIA: CPT

## 2024-09-11 PROCEDURE — 74018 RADEX ABDOMEN 1 VIEW: CPT

## 2024-09-11 PROCEDURE — 99233 SBSQ HOSP IP/OBS HIGH 50: CPT | Performed by: STUDENT IN AN ORGANIZED HEALTH CARE EDUCATION/TRAINING PROGRAM

## 2024-09-11 PROCEDURE — 80048 BASIC METABOLIC PNL TOTAL CA: CPT

## 2024-09-11 PROCEDURE — 6370000000 HC RX 637 (ALT 250 FOR IP): Performed by: INTERNAL MEDICINE

## 2024-09-11 PROCEDURE — 74250 X-RAY XM SM INT 1CNTRST STD: CPT

## 2024-09-11 PROCEDURE — 6370000000 HC RX 637 (ALT 250 FOR IP)

## 2024-09-11 PROCEDURE — 2580000003 HC RX 258

## 2024-09-11 PROCEDURE — 6370000000 HC RX 637 (ALT 250 FOR IP): Performed by: STUDENT IN AN ORGANIZED HEALTH CARE EDUCATION/TRAINING PROGRAM

## 2024-09-11 PROCEDURE — 2500000003 HC RX 250 WO HCPCS: Performed by: STUDENT IN AN ORGANIZED HEALTH CARE EDUCATION/TRAINING PROGRAM

## 2024-09-11 PROCEDURE — 6360000002 HC RX W HCPCS

## 2024-09-11 PROCEDURE — 36415 COLL VENOUS BLD VENIPUNCTURE: CPT

## 2024-09-11 PROCEDURE — 6360000002 HC RX W HCPCS: Performed by: INTERNAL MEDICINE

## 2024-09-11 PROCEDURE — 1200000000 HC SEMI PRIVATE

## 2024-09-11 PROCEDURE — 85025 COMPLETE CBC W/AUTO DIFF WBC: CPT

## 2024-09-11 PROCEDURE — APPSS30 APP SPLIT SHARED TIME 16-30 MINUTES: Performed by: NURSE PRACTITIONER

## 2024-09-11 RX ORDER — ACETAMINOPHEN 325 MG/1
650 TABLET ORAL EVERY 4 HOURS PRN
Status: DISCONTINUED | OUTPATIENT
Start: 2024-09-11 | End: 2024-09-15 | Stop reason: HOSPADM

## 2024-09-11 RX ORDER — ACETAMINOPHEN 325 MG/1
650 TABLET ORAL EVERY 4 HOURS PRN
Status: DISCONTINUED | OUTPATIENT
Start: 2024-09-11 | End: 2024-09-11

## 2024-09-11 RX ORDER — SODIUM CHLORIDE, SODIUM LACTATE, POTASSIUM CHLORIDE, CALCIUM CHLORIDE 600; 310; 30; 20 MG/100ML; MG/100ML; MG/100ML; MG/100ML
INJECTION, SOLUTION INTRAVENOUS CONTINUOUS
Status: ACTIVE | OUTPATIENT
Start: 2024-09-11 | End: 2024-09-12

## 2024-09-11 RX ADMIN — OXYCODONE HYDROCHLORIDE 10 MG: 5 TABLET ORAL at 17:38

## 2024-09-11 RX ADMIN — PANTOPRAZOLE SODIUM 40 MG: 40 TABLET, DELAYED RELEASE ORAL at 08:33

## 2024-09-11 RX ADMIN — ONDANSETRON 4 MG: 2 INJECTION INTRAMUSCULAR; INTRAVENOUS at 08:28

## 2024-09-11 RX ADMIN — MORPHINE SULFATE 4 MG: 4 INJECTION, SOLUTION INTRAMUSCULAR; INTRAVENOUS at 19:30

## 2024-09-11 RX ADMIN — MORPHINE SULFATE 4 MG: 4 INJECTION, SOLUTION INTRAMUSCULAR; INTRAVENOUS at 14:19

## 2024-09-11 RX ADMIN — CLOPIDOGREL BISULFATE 75 MG: 75 TABLET ORAL at 08:33

## 2024-09-11 RX ADMIN — OXYCODONE HYDROCHLORIDE 10 MG: 5 TABLET ORAL at 21:47

## 2024-09-11 RX ADMIN — ONDANSETRON 4 MG: 2 INJECTION INTRAMUSCULAR; INTRAVENOUS at 21:47

## 2024-09-11 RX ADMIN — SODIUM CHLORIDE, POTASSIUM CHLORIDE, SODIUM LACTATE AND CALCIUM CHLORIDE: 600; 310; 30; 20 INJECTION, SOLUTION INTRAVENOUS at 06:09

## 2024-09-11 RX ADMIN — MORPHINE SULFATE 2 MG: 2 INJECTION, SOLUTION INTRAMUSCULAR; INTRAVENOUS at 23:02

## 2024-09-11 RX ADMIN — ONDANSETRON 4 MG: 2 INJECTION INTRAMUSCULAR; INTRAVENOUS at 06:13

## 2024-09-11 RX ADMIN — ENOXAPARIN SODIUM 30 MG: 100 INJECTION SUBCUTANEOUS at 08:33

## 2024-09-11 RX ADMIN — OXYCODONE HYDROCHLORIDE 10 MG: 5 TABLET ORAL at 12:39

## 2024-09-11 RX ADMIN — MORPHINE SULFATE 4 MG: 4 INJECTION, SOLUTION INTRAMUSCULAR; INTRAVENOUS at 16:08

## 2024-09-11 RX ADMIN — VALACYCLOVIR HYDROCHLORIDE 1000 MG: 500 TABLET, FILM COATED ORAL at 08:33

## 2024-09-11 RX ADMIN — MORPHINE SULFATE 4 MG: 4 INJECTION, SOLUTION INTRAMUSCULAR; INTRAVENOUS at 00:38

## 2024-09-11 RX ADMIN — MORPHINE SULFATE 4 MG: 4 INJECTION, SOLUTION INTRAMUSCULAR; INTRAVENOUS at 08:21

## 2024-09-11 RX ADMIN — MORPHINE SULFATE 4 MG: 4 INJECTION, SOLUTION INTRAMUSCULAR; INTRAVENOUS at 11:15

## 2024-09-11 RX ADMIN — ACETAMINOPHEN 650 MG: 325 TABLET ORAL at 08:21

## 2024-09-11 RX ADMIN — MORPHINE SULFATE 4 MG: 4 INJECTION, SOLUTION INTRAMUSCULAR; INTRAVENOUS at 06:12

## 2024-09-11 RX ADMIN — ATORVASTATIN CALCIUM 40 MG: 40 TABLET, FILM COATED ORAL at 08:33

## 2024-09-11 RX ADMIN — BARIUM SULFATE 600 ML: 240 SUSPENSION ORAL at 09:46

## 2024-09-11 ASSESSMENT — PAIN DESCRIPTION - DESCRIPTORS
DESCRIPTORS: ACHING
DESCRIPTORS: ACHING;SHARP;STABBING
DESCRIPTORS: CRAMPING
DESCRIPTORS: ACHING
DESCRIPTORS: ACHING
DESCRIPTORS: ACHING;SHARP
DESCRIPTORS: ACHING;DISCOMFORT
DESCRIPTORS: CRAMPING;HEAVINESS
DESCRIPTORS: ACHING

## 2024-09-11 ASSESSMENT — PAIN DESCRIPTION - LOCATION
LOCATION: ABDOMEN

## 2024-09-11 ASSESSMENT — PAIN - FUNCTIONAL ASSESSMENT
PAIN_FUNCTIONAL_ASSESSMENT: ACTIVITIES ARE NOT PREVENTED
PAIN_FUNCTIONAL_ASSESSMENT: ACTIVITIES ARE NOT PREVENTED
PAIN_FUNCTIONAL_ASSESSMENT: PREVENTS OR INTERFERES SOME ACTIVE ACTIVITIES AND ADLS
PAIN_FUNCTIONAL_ASSESSMENT: ACTIVITIES ARE NOT PREVENTED

## 2024-09-11 ASSESSMENT — PAIN SCALES - GENERAL
PAINLEVEL_OUTOF10: 7
PAINLEVEL_OUTOF10: 5
PAINLEVEL_OUTOF10: 9
PAINLEVEL_OUTOF10: 6
PAINLEVEL_OUTOF10: 7
PAINLEVEL_OUTOF10: 6
PAINLEVEL_OUTOF10: 6
PAINLEVEL_OUTOF10: 8
PAINLEVEL_OUTOF10: 8
PAINLEVEL_OUTOF10: 6
PAINLEVEL_OUTOF10: 7
PAINLEVEL_OUTOF10: 9
PAINLEVEL_OUTOF10: 8

## 2024-09-11 ASSESSMENT — PAIN DESCRIPTION - ORIENTATION
ORIENTATION: LOWER
ORIENTATION: LOWER
ORIENTATION: LOWER;MID
ORIENTATION: LOWER
ORIENTATION: LOWER;MID
ORIENTATION: LOWER
ORIENTATION: LOWER
ORIENTATION: LOWER;MID
ORIENTATION: MID

## 2024-09-12 ENCOUNTER — APPOINTMENT (OUTPATIENT)
Dept: GENERAL RADIOLOGY | Age: 39
DRG: 947 | End: 2024-09-12
Payer: COMMERCIAL

## 2024-09-12 PROBLEM — T73.0XXA STARVATION KETOACIDOSIS: Status: ACTIVE | Noted: 2024-09-12

## 2024-09-12 PROBLEM — E87.29 STARVATION KETOACIDOSIS: Status: ACTIVE | Noted: 2024-09-12

## 2024-09-12 LAB
ANION GAP SERPL CALC-SCNC: 17 MEQ/L (ref 8–16)
B-OH-BUTYR SERPL-MSCNC: 33.03 MG/DL (ref 0.2–2.81)
BASE EXCESS BLDA CALC-SCNC: -3.7 MMOL/L (ref -2–3)
BASOPHILS ABSOLUTE: 0 THOU/MM3 (ref 0–0.1)
BASOPHILS NFR BLD AUTO: 0.9 %
BUN SERPL-MCNC: 8 MG/DL (ref 7–22)
CA-I BLD ISE-SCNC: 1.19 MMOL/L (ref 1.12–1.32)
CALCIUM SERPL-MCNC: 8.6 MG/DL (ref 8.5–10.5)
CHLORIDE SERPL-SCNC: 103 MEQ/L (ref 98–111)
CO2 SERPL-SCNC: 19 MEQ/L (ref 23–33)
COLLECTED BY:: ABNORMAL
CREAT SERPL-MCNC: 0.7 MG/DL (ref 0.4–1.2)
DEPRECATED RDW RBC AUTO: 39.3 FL (ref 35–45)
DEVICE: ABNORMAL
EOSINOPHIL NFR BLD AUTO: 1.6 %
EOSINOPHILS ABSOLUTE: 0.1 THOU/MM3 (ref 0–0.4)
ERYTHROCYTE [DISTWIDTH] IN BLOOD BY AUTOMATED COUNT: 11.5 % (ref 11.5–14.5)
FOLATE SERPL-MCNC: 7.7 NG/ML (ref 4.8–24.2)
GFR SERPL CREATININE-BSD FRML MDRD: > 90 ML/MIN/1.73M2
GLUCOSE BLD STRIP.AUTO-MCNC: 75 MG/DL (ref 70–108)
GLUCOSE SERPL-MCNC: 50 MG/DL (ref 70–108)
HCO3 BLDA-SCNC: 22 MMOL/L (ref 23–28)
HCT VFR BLD AUTO: 35.4 % (ref 37–47)
HGB BLD-MCNC: 12 GM/DL (ref 12–16)
IMM GRANULOCYTES # BLD AUTO: 0.02 THOU/MM3 (ref 0–0.07)
IMM GRANULOCYTES NFR BLD AUTO: 0.5 %
LACTATE SERPL-SCNC: 1 MMOL/L (ref 0.5–2)
LYMPHOCYTES ABSOLUTE: 1.3 THOU/MM3 (ref 1–4.8)
LYMPHOCYTES NFR BLD AUTO: 28.7 %
MAGNESIUM SERPL-MCNC: 1.5 MG/DL (ref 1.6–2.4)
MCH RBC QN AUTO: 31.8 PG (ref 26–33)
MCHC RBC AUTO-ENTMCNC: 33.9 GM/DL (ref 32.2–35.5)
MCV RBC AUTO: 93.9 FL (ref 81–99)
MONOCYTES ABSOLUTE: 0.4 THOU/MM3 (ref 0.4–1.3)
MONOCYTES NFR BLD AUTO: 8.7 %
NEUTROPHILS ABSOLUTE: 2.6 THOU/MM3 (ref 1.8–7.7)
NEUTROPHILS NFR BLD AUTO: 59.6 %
NRBC BLD AUTO-RTO: 0 /100 WBC
PCO2 TEMP ADJ BLDMV: 39 MMHG (ref 41–51)
PH BLDMV: 7.35 [PH] (ref 7.31–7.41)
PHOSPHATE SERPL-MCNC: 3.7 MG/DL (ref 2.4–4.7)
PLATELET # BLD AUTO: 194 THOU/MM3 (ref 130–400)
PMV BLD AUTO: 9.6 FL (ref 9.4–12.4)
PO2 BLDMV: 21 MMHG (ref 25–40)
POTASSIUM SERPL-SCNC: 3.7 MEQ/L (ref 3.5–5.2)
RBC # BLD AUTO: 3.77 MILL/MM3 (ref 4.2–5.4)
SAO2 % BLDMV: 33 %
SITE: ABNORMAL
SODIUM SERPL-SCNC: 139 MEQ/L (ref 135–145)
VENTILATION MODE VENT: ABNORMAL
VIT B12 SERPL-MCNC: > 2000 PG/ML (ref 211–911)
WBC # BLD AUTO: 4.4 THOU/MM3 (ref 4.8–10.8)

## 2024-09-12 PROCEDURE — 76937 US GUIDE VASCULAR ACCESS: CPT

## 2024-09-12 PROCEDURE — 83605 ASSAY OF LACTIC ACID: CPT

## 2024-09-12 PROCEDURE — APPSS30 APP SPLIT SHARED TIME 16-30 MINUTES: Performed by: NURSE PRACTITIONER

## 2024-09-12 PROCEDURE — 2500000003 HC RX 250 WO HCPCS: Performed by: STUDENT IN AN ORGANIZED HEALTH CARE EDUCATION/TRAINING PROGRAM

## 2024-09-12 PROCEDURE — 05HC33Z INSERTION OF INFUSION DEVICE INTO LEFT BASILIC VEIN, PERCUTANEOUS APPROACH: ICD-10-PCS | Performed by: STUDENT IN AN ORGANIZED HEALTH CARE EDUCATION/TRAINING PROGRAM

## 2024-09-12 PROCEDURE — 99233 SBSQ HOSP IP/OBS HIGH 50: CPT | Performed by: STUDENT IN AN ORGANIZED HEALTH CARE EDUCATION/TRAINING PROGRAM

## 2024-09-12 PROCEDURE — 36415 COLL VENOUS BLD VENIPUNCTURE: CPT

## 2024-09-12 PROCEDURE — 6360000002 HC RX W HCPCS

## 2024-09-12 PROCEDURE — 2580000003 HC RX 258: Performed by: INTERNAL MEDICINE

## 2024-09-12 PROCEDURE — 6360000002 HC RX W HCPCS: Performed by: STUDENT IN AN ORGANIZED HEALTH CARE EDUCATION/TRAINING PROGRAM

## 2024-09-12 PROCEDURE — 82607 VITAMIN B-12: CPT

## 2024-09-12 PROCEDURE — 6360000002 HC RX W HCPCS: Performed by: INTERNAL MEDICINE

## 2024-09-12 PROCEDURE — 2580000003 HC RX 258: Performed by: STUDENT IN AN ORGANIZED HEALTH CARE EDUCATION/TRAINING PROGRAM

## 2024-09-12 PROCEDURE — 85025 COMPLETE CBC W/AUTO DIFF WBC: CPT

## 2024-09-12 PROCEDURE — 82746 ASSAY OF FOLIC ACID SERUM: CPT

## 2024-09-12 PROCEDURE — 99233 SBSQ HOSP IP/OBS HIGH 50: CPT | Performed by: SURGERY

## 2024-09-12 PROCEDURE — 82010 KETONE BODYS QUAN: CPT

## 2024-09-12 PROCEDURE — 1200000000 HC SEMI PRIVATE

## 2024-09-12 PROCEDURE — 80048 BASIC METABOLIC PNL TOTAL CA: CPT

## 2024-09-12 PROCEDURE — 82803 BLOOD GASES ANY COMBINATION: CPT

## 2024-09-12 PROCEDURE — 36569 INSJ PICC 5 YR+ W/O IMAGING: CPT

## 2024-09-12 PROCEDURE — 82948 REAGENT STRIP/BLOOD GLUCOSE: CPT

## 2024-09-12 PROCEDURE — 82330 ASSAY OF CALCIUM: CPT

## 2024-09-12 PROCEDURE — 84100 ASSAY OF PHOSPHORUS: CPT

## 2024-09-12 PROCEDURE — 6370000000 HC RX 637 (ALT 250 FOR IP): Performed by: INTERNAL MEDICINE

## 2024-09-12 PROCEDURE — 83735 ASSAY OF MAGNESIUM: CPT

## 2024-09-12 PROCEDURE — 6370000000 HC RX 637 (ALT 250 FOR IP)

## 2024-09-12 PROCEDURE — C1751 CATH, INF, PER/CENT/MIDLINE: HCPCS

## 2024-09-12 PROCEDURE — 82525 ASSAY OF COPPER: CPT

## 2024-09-12 PROCEDURE — 74018 RADEX ABDOMEN 1 VIEW: CPT

## 2024-09-12 RX ORDER — PROCHLORPERAZINE EDISYLATE 5 MG/ML
10 INJECTION INTRAMUSCULAR; INTRAVENOUS EVERY 6 HOURS PRN
Status: DISCONTINUED | OUTPATIENT
Start: 2024-09-12 | End: 2024-09-15 | Stop reason: HOSPADM

## 2024-09-12 RX ORDER — SODIUM CHLORIDE, SODIUM LACTATE, POTASSIUM CHLORIDE, CALCIUM CHLORIDE 600; 310; 30; 20 MG/100ML; MG/100ML; MG/100ML; MG/100ML
INJECTION, SOLUTION INTRAVENOUS CONTINUOUS
Status: ACTIVE | OUTPATIENT
Start: 2024-09-12 | End: 2024-09-13

## 2024-09-12 RX ORDER — LIDOCAINE HYDROCHLORIDE 10 MG/ML
50 INJECTION, SOLUTION EPIDURAL; INFILTRATION; INTRACAUDAL; PERINEURAL ONCE
Status: DISCONTINUED | OUTPATIENT
Start: 2024-09-12 | End: 2024-09-15 | Stop reason: HOSPADM

## 2024-09-12 RX ORDER — SODIUM CHLORIDE 0.9 % (FLUSH) 0.9 %
5-40 SYRINGE (ML) INJECTION PRN
Status: DISCONTINUED | OUTPATIENT
Start: 2024-09-12 | End: 2024-09-15 | Stop reason: HOSPADM

## 2024-09-12 RX ORDER — SODIUM CHLORIDE 9 MG/ML
INJECTION, SOLUTION INTRAVENOUS PRN
Status: DISCONTINUED | OUTPATIENT
Start: 2024-09-12 | End: 2024-09-15 | Stop reason: HOSPADM

## 2024-09-12 RX ORDER — INSULIN LISPRO 100 [IU]/ML
0-4 INJECTION, SOLUTION INTRAVENOUS; SUBCUTANEOUS EVERY 6 HOURS
Status: DISCONTINUED | OUTPATIENT
Start: 2024-09-13 | End: 2024-09-15 | Stop reason: HOSPADM

## 2024-09-12 RX ORDER — SODIUM CHLORIDE 0.9 % (FLUSH) 0.9 %
5-40 SYRINGE (ML) INJECTION EVERY 12 HOURS SCHEDULED
Status: DISCONTINUED | OUTPATIENT
Start: 2024-09-12 | End: 2024-09-15 | Stop reason: HOSPADM

## 2024-09-12 RX ADMIN — PROCHLORPERAZINE EDISYLATE 10 MG: 5 INJECTION INTRAMUSCULAR; INTRAVENOUS at 15:16

## 2024-09-12 RX ADMIN — VALACYCLOVIR HYDROCHLORIDE 1000 MG: 500 TABLET, FILM COATED ORAL at 08:58

## 2024-09-12 RX ADMIN — MORPHINE SULFATE 4 MG: 4 INJECTION, SOLUTION INTRAMUSCULAR; INTRAVENOUS at 07:31

## 2024-09-12 RX ADMIN — LEUCINE, PHENYLALANINE, LYSINE, METHIONINE, ISOLEUCINE, VALINE, HISTIDINE, THREONINE, TRYPTOPHAN, ALANINE, GLYCINE, ARGININE, PROLINE, SERINE, TYROSINE, DEXTROSE: 365; 280; 290; 200; 300; 290; 240; 210; 90; 1035; 515; 575; 340; 250; 20; 15 INJECTION INTRAVENOUS at 19:58

## 2024-09-12 RX ADMIN — CLOPIDOGREL BISULFATE 75 MG: 75 TABLET ORAL at 08:57

## 2024-09-12 RX ADMIN — MAGNESIUM SULFATE HEPTAHYDRATE 2000 MG: 40 INJECTION, SOLUTION INTRAVENOUS at 17:57

## 2024-09-12 RX ADMIN — SODIUM CHLORIDE, PRESERVATIVE FREE 10 ML: 5 INJECTION INTRAVENOUS at 20:02

## 2024-09-12 RX ADMIN — SODIUM CHLORIDE, POTASSIUM CHLORIDE, SODIUM LACTATE AND CALCIUM CHLORIDE: 600; 310; 30; 20 INJECTION, SOLUTION INTRAVENOUS at 17:49

## 2024-09-12 RX ADMIN — ONDANSETRON 4 MG: 2 INJECTION INTRAMUSCULAR; INTRAVENOUS at 10:15

## 2024-09-12 RX ADMIN — OXYCODONE HYDROCHLORIDE 10 MG: 5 TABLET ORAL at 13:56

## 2024-09-12 RX ADMIN — SODIUM CHLORIDE, POTASSIUM CHLORIDE, SODIUM LACTATE AND CALCIUM CHLORIDE: 600; 310; 30; 20 INJECTION, SOLUTION INTRAVENOUS at 02:43

## 2024-09-12 RX ADMIN — MORPHINE SULFATE 4 MG: 4 INJECTION, SOLUTION INTRAMUSCULAR; INTRAVENOUS at 15:16

## 2024-09-12 RX ADMIN — ENOXAPARIN SODIUM 30 MG: 100 INJECTION SUBCUTANEOUS at 08:57

## 2024-09-12 RX ADMIN — MORPHINE SULFATE 4 MG: 4 INJECTION, SOLUTION INTRAMUSCULAR; INTRAVENOUS at 17:47

## 2024-09-12 RX ADMIN — ATORVASTATIN CALCIUM 40 MG: 40 TABLET, FILM COATED ORAL at 08:58

## 2024-09-12 RX ADMIN — SODIUM CHLORIDE, PRESERVATIVE FREE 10 ML: 5 INJECTION INTRAVENOUS at 20:01

## 2024-09-12 RX ADMIN — ONDANSETRON 4 MG: 2 INJECTION INTRAMUSCULAR; INTRAVENOUS at 04:07

## 2024-09-12 RX ADMIN — BUPROPION HYDROCHLORIDE 300 MG: 300 TABLET, EXTENDED RELEASE ORAL at 08:58

## 2024-09-12 RX ADMIN — OXYCODONE HYDROCHLORIDE 10 MG: 5 TABLET ORAL at 19:00

## 2024-09-12 RX ADMIN — MORPHINE SULFATE 4 MG: 4 INJECTION, SOLUTION INTRAMUSCULAR; INTRAVENOUS at 02:42

## 2024-09-12 RX ADMIN — MORPHINE SULFATE 4 MG: 4 INJECTION, SOLUTION INTRAMUSCULAR; INTRAVENOUS at 10:15

## 2024-09-12 RX ADMIN — PANTOPRAZOLE SODIUM 40 MG: 40 TABLET, DELAYED RELEASE ORAL at 08:58

## 2024-09-12 RX ADMIN — OXYCODONE HYDROCHLORIDE 10 MG: 5 TABLET ORAL at 08:57

## 2024-09-12 RX ADMIN — MORPHINE SULFATE 4 MG: 4 INJECTION, SOLUTION INTRAMUSCULAR; INTRAVENOUS at 20:01

## 2024-09-12 RX ADMIN — OXYCODONE HYDROCHLORIDE 10 MG: 5 TABLET ORAL at 04:06

## 2024-09-12 ASSESSMENT — PAIN SCALES - GENERAL
PAINLEVEL_OUTOF10: 7
PAINLEVEL_OUTOF10: 8
PAINLEVEL_OUTOF10: 7
PAINLEVEL_OUTOF10: 8
PAINLEVEL_OUTOF10: 10
PAINLEVEL_OUTOF10: 7
PAINLEVEL_OUTOF10: 8
PAINLEVEL_OUTOF10: 9
PAINLEVEL_OUTOF10: 9
PAINLEVEL_OUTOF10: 10
PAINLEVEL_OUTOF10: 8
PAINLEVEL_OUTOF10: 7
PAINLEVEL_OUTOF10: 7

## 2024-09-12 ASSESSMENT — PAIN DESCRIPTION - DESCRIPTORS
DESCRIPTORS: ACHING
DESCRIPTORS: ACHING;CRAMPING
DESCRIPTORS: ACHING;SHARP;STABBING
DESCRIPTORS: SHARP;STABBING
DESCRIPTORS: ACHING
DESCRIPTORS: ACHING;CRAMPING

## 2024-09-12 ASSESSMENT — PAIN - FUNCTIONAL ASSESSMENT
PAIN_FUNCTIONAL_ASSESSMENT: ACTIVITIES ARE NOT PREVENTED

## 2024-09-12 ASSESSMENT — PAIN DESCRIPTION - LOCATION
LOCATION: ABDOMEN

## 2024-09-12 ASSESSMENT — PAIN DESCRIPTION - ORIENTATION
ORIENTATION: MID
ORIENTATION: LEFT;LOWER
ORIENTATION: LOWER
ORIENTATION: LOWER

## 2024-09-12 ASSESSMENT — PAIN DESCRIPTION - PAIN TYPE: TYPE: ACUTE PAIN

## 2024-09-12 ASSESSMENT — PAIN DESCRIPTION - ONSET: ONSET: AWAKENED FROM SLEEP

## 2024-09-12 ASSESSMENT — PAIN DESCRIPTION - FREQUENCY: FREQUENCY: CONTINUOUS

## 2024-09-13 ENCOUNTER — APPOINTMENT (OUTPATIENT)
Dept: ULTRASOUND IMAGING | Age: 39
DRG: 947 | End: 2024-09-13
Payer: COMMERCIAL

## 2024-09-13 LAB
ALBUMIN SERPL BCG-MCNC: 3.8 G/DL (ref 3.5–5.1)
ANION GAP SERPL CALC-SCNC: 10 MEQ/L (ref 8–16)
BASOPHILS ABSOLUTE: 0 THOU/MM3 (ref 0–0.1)
BASOPHILS NFR BLD AUTO: 0.6 %
BUN SERPL-MCNC: 5 MG/DL (ref 7–22)
CA-I BLD ISE-SCNC: 1.13 MMOL/L (ref 1.12–1.32)
CALCIUM SERPL-MCNC: 8.2 MG/DL (ref 8.5–10.5)
CHLORIDE SERPL-SCNC: 105 MEQ/L (ref 98–111)
CO2 SERPL-SCNC: 23 MEQ/L (ref 23–33)
CREAT SERPL-MCNC: 0.7 MG/DL (ref 0.4–1.2)
DEPRECATED RDW RBC AUTO: 39 FL (ref 35–45)
EOSINOPHIL NFR BLD AUTO: 3 %
EOSINOPHILS ABSOLUTE: 0.1 THOU/MM3 (ref 0–0.4)
ERYTHROCYTE [DISTWIDTH] IN BLOOD BY AUTOMATED COUNT: 11.4 % (ref 11.5–14.5)
GFR SERPL CREATININE-BSD FRML MDRD: > 90 ML/MIN/1.73M2
GLUCOSE BLD STRIP.AUTO-MCNC: 112 MG/DL (ref 70–108)
GLUCOSE BLD STRIP.AUTO-MCNC: 123 MG/DL (ref 70–108)
GLUCOSE BLD STRIP.AUTO-MCNC: 152 MG/DL (ref 70–108)
GLUCOSE BLD STRIP.AUTO-MCNC: 96 MG/DL (ref 70–108)
GLUCOSE SERPL-MCNC: 115 MG/DL (ref 70–108)
HCT VFR BLD AUTO: 32.9 % (ref 37–47)
HGB BLD-MCNC: 11.2 GM/DL (ref 12–16)
IMM GRANULOCYTES # BLD AUTO: 0.01 THOU/MM3 (ref 0–0.07)
IMM GRANULOCYTES NFR BLD AUTO: 0.2 %
LIPASE SERPL-CCNC: 17.5 U/L (ref 5.6–51.3)
LYMPHOCYTES ABSOLUTE: 1.5 THOU/MM3 (ref 1–4.8)
LYMPHOCYTES NFR BLD AUTO: 32.8 %
MAGNESIUM SERPL-MCNC: 1.9 MG/DL (ref 1.6–2.4)
MCH RBC QN AUTO: 31.5 PG (ref 26–33)
MCHC RBC AUTO-ENTMCNC: 34 GM/DL (ref 32.2–35.5)
MCV RBC AUTO: 92.4 FL (ref 81–99)
MONOCYTES ABSOLUTE: 0.5 THOU/MM3 (ref 0.4–1.3)
MONOCYTES NFR BLD AUTO: 10.2 %
NEUTROPHILS ABSOLUTE: 2.5 THOU/MM3 (ref 1.8–7.7)
NEUTROPHILS NFR BLD AUTO: 53.2 %
NRBC BLD AUTO-RTO: 0 /100 WBC
PHOSPHATE SERPL-MCNC: 3.8 MG/DL (ref 2.4–4.7)
PLATELET # BLD AUTO: 200 THOU/MM3 (ref 130–400)
PMV BLD AUTO: 9.4 FL (ref 9.4–12.4)
POTASSIUM SERPL-SCNC: 4.1 MEQ/L (ref 3.5–5.2)
RBC # BLD AUTO: 3.56 MILL/MM3 (ref 4.2–5.4)
SODIUM SERPL-SCNC: 138 MEQ/L (ref 135–145)
WBC # BLD AUTO: 4.7 THOU/MM3 (ref 4.8–10.8)

## 2024-09-13 PROCEDURE — 2500000003 HC RX 250 WO HCPCS: Performed by: STUDENT IN AN ORGANIZED HEALTH CARE EDUCATION/TRAINING PROGRAM

## 2024-09-13 PROCEDURE — 80048 BASIC METABOLIC PNL TOTAL CA: CPT

## 2024-09-13 PROCEDURE — 6360000002 HC RX W HCPCS

## 2024-09-13 PROCEDURE — 82040 ASSAY OF SERUM ALBUMIN: CPT

## 2024-09-13 PROCEDURE — 83690 ASSAY OF LIPASE: CPT

## 2024-09-13 PROCEDURE — 99233 SBSQ HOSP IP/OBS HIGH 50: CPT | Performed by: STUDENT IN AN ORGANIZED HEALTH CARE EDUCATION/TRAINING PROGRAM

## 2024-09-13 PROCEDURE — 84100 ASSAY OF PHOSPHORUS: CPT

## 2024-09-13 PROCEDURE — 85025 COMPLETE CBC W/AUTO DIFF WBC: CPT

## 2024-09-13 PROCEDURE — 6360000002 HC RX W HCPCS: Performed by: INTERNAL MEDICINE

## 2024-09-13 PROCEDURE — 6370000000 HC RX 637 (ALT 250 FOR IP)

## 2024-09-13 PROCEDURE — 2580000003 HC RX 258: Performed by: INTERNAL MEDICINE

## 2024-09-13 PROCEDURE — 76705 ECHO EXAM OF ABDOMEN: CPT

## 2024-09-13 PROCEDURE — 36415 COLL VENOUS BLD VENIPUNCTURE: CPT

## 2024-09-13 PROCEDURE — 82330 ASSAY OF CALCIUM: CPT

## 2024-09-13 PROCEDURE — 2580000003 HC RX 258: Performed by: STUDENT IN AN ORGANIZED HEALTH CARE EDUCATION/TRAINING PROGRAM

## 2024-09-13 PROCEDURE — 83735 ASSAY OF MAGNESIUM: CPT

## 2024-09-13 PROCEDURE — 6370000000 HC RX 637 (ALT 250 FOR IP): Performed by: INTERNAL MEDICINE

## 2024-09-13 PROCEDURE — 6360000002 HC RX W HCPCS: Performed by: STUDENT IN AN ORGANIZED HEALTH CARE EDUCATION/TRAINING PROGRAM

## 2024-09-13 PROCEDURE — 82948 REAGENT STRIP/BLOOD GLUCOSE: CPT

## 2024-09-13 PROCEDURE — 1200000000 HC SEMI PRIVATE

## 2024-09-13 RX ORDER — 0.9 % SODIUM CHLORIDE 0.9 %
1000 INTRAVENOUS SOLUTION INTRAVENOUS ONCE
Status: COMPLETED | OUTPATIENT
Start: 2024-09-13 | End: 2024-09-13

## 2024-09-13 RX ADMIN — MORPHINE SULFATE 4 MG: 4 INJECTION, SOLUTION INTRAMUSCULAR; INTRAVENOUS at 05:50

## 2024-09-13 RX ADMIN — SODIUM CHLORIDE 1000 ML: 9 INJECTION, SOLUTION INTRAVENOUS at 08:01

## 2024-09-13 RX ADMIN — OXYCODONE HYDROCHLORIDE 10 MG: 5 TABLET ORAL at 01:32

## 2024-09-13 RX ADMIN — OXYCODONE HYDROCHLORIDE 10 MG: 5 TABLET ORAL at 21:04

## 2024-09-13 RX ADMIN — SODIUM CHLORIDE, PRESERVATIVE FREE 10 ML: 5 INJECTION INTRAVENOUS at 19:37

## 2024-09-13 RX ADMIN — MORPHINE SULFATE 4 MG: 4 INJECTION, SOLUTION INTRAMUSCULAR; INTRAVENOUS at 00:29

## 2024-09-13 RX ADMIN — SODIUM CHLORIDE 1000 ML: 9 INJECTION, SOLUTION INTRAVENOUS at 09:32

## 2024-09-13 RX ADMIN — OXYCODONE HYDROCHLORIDE 10 MG: 5 TABLET ORAL at 13:26

## 2024-09-13 RX ADMIN — ATORVASTATIN CALCIUM 40 MG: 40 TABLET, FILM COATED ORAL at 07:38

## 2024-09-13 RX ADMIN — PANTOPRAZOLE SODIUM 40 MG: 40 TABLET, DELAYED RELEASE ORAL at 07:36

## 2024-09-13 RX ADMIN — MORPHINE SULFATE 4 MG: 4 INJECTION, SOLUTION INTRAMUSCULAR; INTRAVENOUS at 15:05

## 2024-09-13 RX ADMIN — VALACYCLOVIR HYDROCHLORIDE 1000 MG: 500 TABLET, FILM COATED ORAL at 07:38

## 2024-09-13 RX ADMIN — ONDANSETRON 4 MG: 2 INJECTION INTRAMUSCULAR; INTRAVENOUS at 22:17

## 2024-09-13 RX ADMIN — CALCIUM GLUCONATE: 98 INJECTION, SOLUTION INTRAVENOUS at 19:16

## 2024-09-13 RX ADMIN — MORPHINE SULFATE 4 MG: 4 INJECTION, SOLUTION INTRAMUSCULAR; INTRAVENOUS at 22:17

## 2024-09-13 RX ADMIN — MORPHINE SULFATE 4 MG: 4 INJECTION, SOLUTION INTRAMUSCULAR; INTRAVENOUS at 16:59

## 2024-09-13 RX ADMIN — MORPHINE SULFATE 4 MG: 4 INJECTION, SOLUTION INTRAMUSCULAR; INTRAVENOUS at 19:36

## 2024-09-13 RX ADMIN — ENOXAPARIN SODIUM 30 MG: 100 INJECTION SUBCUTANEOUS at 07:35

## 2024-09-13 RX ADMIN — SODIUM CHLORIDE, POTASSIUM CHLORIDE, SODIUM LACTATE AND CALCIUM CHLORIDE: 600; 310; 30; 20 INJECTION, SOLUTION INTRAVENOUS at 00:33

## 2024-09-13 RX ADMIN — CLOPIDOGREL BISULFATE 75 MG: 75 TABLET ORAL at 07:36

## 2024-09-13 RX ADMIN — MORPHINE SULFATE 4 MG: 4 INJECTION, SOLUTION INTRAMUSCULAR; INTRAVENOUS at 11:28

## 2024-09-13 RX ADMIN — PROCHLORPERAZINE EDISYLATE 10 MG: 5 INJECTION INTRAMUSCULAR; INTRAVENOUS at 07:35

## 2024-09-13 RX ADMIN — OXYCODONE HYDROCHLORIDE 10 MG: 5 TABLET ORAL at 07:35

## 2024-09-13 ASSESSMENT — PAIN DESCRIPTION - DESCRIPTORS
DESCRIPTORS: ACHING;SHARP;STABBING
DESCRIPTORS: ACHING
DESCRIPTORS: ACHING
DESCRIPTORS: ACHING;SHARP
DESCRIPTORS: ACHING;SHARP
DESCRIPTORS: ACHING
DESCRIPTORS: ACHING
DESCRIPTORS: ACHING;SHARP

## 2024-09-13 ASSESSMENT — PAIN DESCRIPTION - LOCATION
LOCATION: ABDOMEN

## 2024-09-13 ASSESSMENT — PAIN - FUNCTIONAL ASSESSMENT
PAIN_FUNCTIONAL_ASSESSMENT: ACTIVITIES ARE NOT PREVENTED

## 2024-09-13 ASSESSMENT — PAIN DESCRIPTION - ORIENTATION
ORIENTATION: LOWER
ORIENTATION: MID
ORIENTATION: MID
ORIENTATION: LEFT
ORIENTATION: MID
ORIENTATION: LEFT;LOWER
ORIENTATION: MID
ORIENTATION: LOWER

## 2024-09-13 ASSESSMENT — PAIN SCALES - GENERAL
PAINLEVEL_OUTOF10: 7
PAINLEVEL_OUTOF10: 7
PAINLEVEL_OUTOF10: 6
PAINLEVEL_OUTOF10: 7
PAINLEVEL_OUTOF10: 8
PAINLEVEL_OUTOF10: 7
PAINLEVEL_OUTOF10: 10
PAINLEVEL_OUTOF10: 8
PAINLEVEL_OUTOF10: 8
PAINLEVEL_OUTOF10: 7
PAINLEVEL_OUTOF10: 7
PAINLEVEL_OUTOF10: 8
PAINLEVEL_OUTOF10: 7
PAINLEVEL_OUTOF10: 7
PAINLEVEL_OUTOF10: 8
PAINLEVEL_OUTOF10: 6
PAINLEVEL_OUTOF10: 7
PAINLEVEL_OUTOF10: 6

## 2024-09-13 ASSESSMENT — PAIN DESCRIPTION - FREQUENCY: FREQUENCY: CONTINUOUS

## 2024-09-13 ASSESSMENT — PAIN DESCRIPTION - PAIN TYPE: TYPE: ACUTE PAIN

## 2024-09-13 ASSESSMENT — PAIN DESCRIPTION - ONSET: ONSET: ON-GOING

## 2024-09-14 ENCOUNTER — APPOINTMENT (OUTPATIENT)
Dept: CT IMAGING | Age: 39
DRG: 947 | End: 2024-09-14
Payer: COMMERCIAL

## 2024-09-14 LAB
ALBUMIN SERPL BCG-MCNC: 3.8 G/DL (ref 3.5–5.1)
ALP SERPL-CCNC: 57 U/L (ref 38–126)
ALT SERPL W/O P-5'-P-CCNC: 36 U/L (ref 11–66)
ANION GAP SERPL CALC-SCNC: 8 MEQ/L (ref 8–16)
AST SERPL-CCNC: 17 U/L (ref 5–40)
BASOPHILS ABSOLUTE: 0.1 THOU/MM3 (ref 0–0.1)
BASOPHILS NFR BLD AUTO: 1.1 %
BILIRUB CONJ SERPL-MCNC: < 0.1 MG/DL (ref 0.1–13.8)
BILIRUB SERPL-MCNC: 0.2 MG/DL (ref 0.3–1.2)
BUN SERPL-MCNC: 5 MG/DL (ref 7–22)
CA-I BLD ISE-SCNC: 1.18 MMOL/L (ref 1.12–1.32)
CALCIUM SERPL-MCNC: 8.5 MG/DL (ref 8.5–10.5)
CHLORIDE SERPL-SCNC: 104 MEQ/L (ref 98–111)
CO2 SERPL-SCNC: 23 MEQ/L (ref 23–33)
COPPER SERPL-MCNC: 68.9 UG/DL (ref 80–155)
CREAT SERPL-MCNC: 0.5 MG/DL (ref 0.4–1.2)
DEPRECATED RDW RBC AUTO: 39.2 FL (ref 35–45)
EOSINOPHIL NFR BLD AUTO: 3.4 %
EOSINOPHILS ABSOLUTE: 0.2 THOU/MM3 (ref 0–0.4)
ERYTHROCYTE [DISTWIDTH] IN BLOOD BY AUTOMATED COUNT: 11.5 % (ref 11.5–14.5)
GFR SERPL CREATININE-BSD FRML MDRD: > 90 ML/MIN/1.73M2
GLUCOSE BLD STRIP.AUTO-MCNC: 110 MG/DL (ref 70–108)
GLUCOSE BLD STRIP.AUTO-MCNC: 88 MG/DL (ref 70–108)
GLUCOSE BLD STRIP.AUTO-MCNC: 96 MG/DL (ref 70–108)
GLUCOSE SERPL-MCNC: 94 MG/DL (ref 70–108)
HCT VFR BLD AUTO: 32.7 % (ref 37–47)
HGB BLD-MCNC: 11.2 GM/DL (ref 12–16)
IMM GRANULOCYTES # BLD AUTO: 0.01 THOU/MM3 (ref 0–0.07)
IMM GRANULOCYTES NFR BLD AUTO: 0.2 %
LYMPHOCYTES ABSOLUTE: 1.7 THOU/MM3 (ref 1–4.8)
LYMPHOCYTES NFR BLD AUTO: 35.9 %
MAGNESIUM SERPL-MCNC: 1.6 MG/DL (ref 1.6–2.4)
MCH RBC QN AUTO: 31.6 PG (ref 26–33)
MCHC RBC AUTO-ENTMCNC: 34.3 GM/DL (ref 32.2–35.5)
MCV RBC AUTO: 92.4 FL (ref 81–99)
MONOCYTES ABSOLUTE: 0.5 THOU/MM3 (ref 0.4–1.3)
MONOCYTES NFR BLD AUTO: 9.6 %
NEUTROPHILS ABSOLUTE: 2.3 THOU/MM3 (ref 1.8–7.7)
NEUTROPHILS NFR BLD AUTO: 49.8 %
NRBC BLD AUTO-RTO: 0 /100 WBC
PHOSPHATE SERPL-MCNC: 4.1 MG/DL (ref 2.4–4.7)
PLATELET # BLD AUTO: 183 THOU/MM3 (ref 130–400)
PMV BLD AUTO: 9.6 FL (ref 9.4–12.4)
POTASSIUM SERPL-SCNC: 4 MEQ/L (ref 3.5–5.2)
PROT SERPL-MCNC: 5.8 G/DL (ref 6.1–8)
RBC # BLD AUTO: 3.54 MILL/MM3 (ref 4.2–5.4)
SODIUM SERPL-SCNC: 135 MEQ/L (ref 135–145)
WBC # BLD AUTO: 4.7 THOU/MM3 (ref 4.8–10.8)

## 2024-09-14 PROCEDURE — 6360000002 HC RX W HCPCS: Performed by: STUDENT IN AN ORGANIZED HEALTH CARE EDUCATION/TRAINING PROGRAM

## 2024-09-14 PROCEDURE — 2580000003 HC RX 258: Performed by: STUDENT IN AN ORGANIZED HEALTH CARE EDUCATION/TRAINING PROGRAM

## 2024-09-14 PROCEDURE — 6360000002 HC RX W HCPCS

## 2024-09-14 PROCEDURE — 74174 CTA ABD&PLVS W/CONTRAST: CPT

## 2024-09-14 PROCEDURE — 6370000000 HC RX 637 (ALT 250 FOR IP)

## 2024-09-14 PROCEDURE — 80053 COMPREHEN METABOLIC PANEL: CPT

## 2024-09-14 PROCEDURE — 1200000000 HC SEMI PRIVATE

## 2024-09-14 PROCEDURE — 85025 COMPLETE CBC W/AUTO DIFF WBC: CPT

## 2024-09-14 PROCEDURE — 6360000002 HC RX W HCPCS: Performed by: PHARMACIST

## 2024-09-14 PROCEDURE — 82248 BILIRUBIN DIRECT: CPT

## 2024-09-14 PROCEDURE — 82330 ASSAY OF CALCIUM: CPT

## 2024-09-14 PROCEDURE — 6370000000 HC RX 637 (ALT 250 FOR IP): Performed by: INTERNAL MEDICINE

## 2024-09-14 PROCEDURE — 84100 ASSAY OF PHOSPHORUS: CPT

## 2024-09-14 PROCEDURE — 82948 REAGENT STRIP/BLOOD GLUCOSE: CPT

## 2024-09-14 PROCEDURE — 99233 SBSQ HOSP IP/OBS HIGH 50: CPT | Performed by: STUDENT IN AN ORGANIZED HEALTH CARE EDUCATION/TRAINING PROGRAM

## 2024-09-14 PROCEDURE — 6360000004 HC RX CONTRAST MEDICATION: Performed by: STUDENT IN AN ORGANIZED HEALTH CARE EDUCATION/TRAINING PROGRAM

## 2024-09-14 PROCEDURE — 2580000003 HC RX 258: Performed by: PHARMACIST

## 2024-09-14 PROCEDURE — 36415 COLL VENOUS BLD VENIPUNCTURE: CPT

## 2024-09-14 PROCEDURE — 83735 ASSAY OF MAGNESIUM: CPT

## 2024-09-14 PROCEDURE — 2580000003 HC RX 258: Performed by: INTERNAL MEDICINE

## 2024-09-14 PROCEDURE — 2500000003 HC RX 250 WO HCPCS: Performed by: PHARMACIST

## 2024-09-14 PROCEDURE — 6360000002 HC RX W HCPCS: Performed by: INTERNAL MEDICINE

## 2024-09-14 RX ORDER — IOPAMIDOL 755 MG/ML
80 INJECTION, SOLUTION INTRAVASCULAR
Status: COMPLETED | OUTPATIENT
Start: 2024-09-14 | End: 2024-09-14

## 2024-09-14 RX ADMIN — ENOXAPARIN SODIUM 30 MG: 100 INJECTION SUBCUTANEOUS at 08:41

## 2024-09-14 RX ADMIN — ONDANSETRON 4 MG: 4 TABLET, ORALLY DISINTEGRATING ORAL at 21:51

## 2024-09-14 RX ADMIN — MORPHINE SULFATE 4 MG: 4 INJECTION, SOLUTION INTRAMUSCULAR; INTRAVENOUS at 10:54

## 2024-09-14 RX ADMIN — OXYCODONE HYDROCHLORIDE 10 MG: 5 TABLET ORAL at 09:29

## 2024-09-14 RX ADMIN — MORPHINE SULFATE 4 MG: 4 INJECTION, SOLUTION INTRAMUSCULAR; INTRAVENOUS at 14:21

## 2024-09-14 RX ADMIN — SODIUM CHLORIDE, PRESERVATIVE FREE 10 ML: 5 INJECTION INTRAVENOUS at 20:29

## 2024-09-14 RX ADMIN — SODIUM CHLORIDE, PRESERVATIVE FREE 10 ML: 5 INJECTION INTRAVENOUS at 08:42

## 2024-09-14 RX ADMIN — PANTOPRAZOLE SODIUM 40 MG: 40 TABLET, DELAYED RELEASE ORAL at 08:42

## 2024-09-14 RX ADMIN — OXYCODONE HYDROCHLORIDE 10 MG: 5 TABLET ORAL at 04:03

## 2024-09-14 RX ADMIN — MORPHINE SULFATE 4 MG: 4 INJECTION, SOLUTION INTRAMUSCULAR; INTRAVENOUS at 16:52

## 2024-09-14 RX ADMIN — MORPHINE SULFATE 4 MG: 4 INJECTION, SOLUTION INTRAMUSCULAR; INTRAVENOUS at 20:17

## 2024-09-14 RX ADMIN — OXYCODONE HYDROCHLORIDE 10 MG: 5 TABLET ORAL at 21:51

## 2024-09-14 RX ADMIN — MAGNESIUM SULFATE HEPTAHYDRATE 2000 MG: 40 INJECTION, SOLUTION INTRAVENOUS at 08:50

## 2024-09-14 RX ADMIN — CALCIUM GLUCONATE: 98 INJECTION, SOLUTION INTRAVENOUS at 17:58

## 2024-09-14 RX ADMIN — ATORVASTATIN CALCIUM 40 MG: 40 TABLET, FILM COATED ORAL at 08:41

## 2024-09-14 RX ADMIN — CLOPIDOGREL BISULFATE 75 MG: 75 TABLET ORAL at 08:41

## 2024-09-14 RX ADMIN — MORPHINE SULFATE 4 MG: 4 INJECTION, SOLUTION INTRAMUSCULAR; INTRAVENOUS at 01:22

## 2024-09-14 RX ADMIN — OXYCODONE HYDROCHLORIDE 10 MG: 5 TABLET ORAL at 13:35

## 2024-09-14 RX ADMIN — BUPROPION HYDROCHLORIDE 300 MG: 300 TABLET, EXTENDED RELEASE ORAL at 08:41

## 2024-09-14 RX ADMIN — VALACYCLOVIR HYDROCHLORIDE 1000 MG: 500 TABLET, FILM COATED ORAL at 08:42

## 2024-09-14 RX ADMIN — MORPHINE SULFATE 4 MG: 4 INJECTION, SOLUTION INTRAMUSCULAR; INTRAVENOUS at 08:31

## 2024-09-14 RX ADMIN — IOPAMIDOL 80 ML: 755 INJECTION, SOLUTION INTRAVENOUS at 11:59

## 2024-09-14 RX ADMIN — PROCHLORPERAZINE EDISYLATE 10 MG: 5 INJECTION INTRAMUSCULAR; INTRAVENOUS at 04:03

## 2024-09-14 RX ADMIN — SODIUM CHLORIDE, PRESERVATIVE FREE 10 ML: 5 INJECTION INTRAVENOUS at 20:18

## 2024-09-14 ASSESSMENT — PAIN DESCRIPTION - PAIN TYPE
TYPE: ACUTE PAIN

## 2024-09-14 ASSESSMENT — PAIN SCALES - GENERAL
PAINLEVEL_OUTOF10: 6
PAINLEVEL_OUTOF10: 8
PAINLEVEL_OUTOF10: 8
PAINLEVEL_OUTOF10: 6
PAINLEVEL_OUTOF10: 0
PAINLEVEL_OUTOF10: 8
PAINLEVEL_OUTOF10: 7
PAINLEVEL_OUTOF10: 0
PAINLEVEL_OUTOF10: 8
PAINLEVEL_OUTOF10: 5
PAINLEVEL_OUTOF10: 7
PAINLEVEL_OUTOF10: 8
PAINLEVEL_OUTOF10: 7
PAINLEVEL_OUTOF10: 7
PAINLEVEL_OUTOF10: 6
PAINLEVEL_OUTOF10: 7
PAINLEVEL_OUTOF10: 8
PAINLEVEL_OUTOF10: 7

## 2024-09-14 ASSESSMENT — PAIN DESCRIPTION - LOCATION
LOCATION: ABDOMEN

## 2024-09-14 ASSESSMENT — PAIN DESCRIPTION - DIRECTION: RADIATING_TOWARDS: RIGHT ABDOMEN

## 2024-09-14 ASSESSMENT — PAIN DESCRIPTION - FREQUENCY
FREQUENCY: INTERMITTENT
FREQUENCY: INTERMITTENT

## 2024-09-14 ASSESSMENT — PAIN - FUNCTIONAL ASSESSMENT
PAIN_FUNCTIONAL_ASSESSMENT: ACTIVITIES ARE NOT PREVENTED

## 2024-09-14 ASSESSMENT — PAIN DESCRIPTION - ORIENTATION
ORIENTATION: MID
ORIENTATION: MID
ORIENTATION: MID;LEFT
ORIENTATION: LEFT;MID
ORIENTATION: LEFT;MID
ORIENTATION: MID
ORIENTATION: MID
ORIENTATION: LEFT;MID

## 2024-09-14 ASSESSMENT — PAIN SCALES - WONG BAKER
WONGBAKER_NUMERICALRESPONSE: NO HURT
WONGBAKER_NUMERICALRESPONSE: NO HURT

## 2024-09-14 ASSESSMENT — PAIN DESCRIPTION - DESCRIPTORS
DESCRIPTORS: DISCOMFORT;STABBING
DESCRIPTORS: STABBING
DESCRIPTORS: ACHING
DESCRIPTORS: STABBING
DESCRIPTORS: STABBING;SHARP;ACHING
DESCRIPTORS: STABBING;TENDER
DESCRIPTORS: ACHING
DESCRIPTORS: SHOOTING;STABBING
DESCRIPTORS: SHARP;JABBING;STABBING

## 2024-09-14 ASSESSMENT — PAIN DESCRIPTION - ONSET
ONSET: ON-GOING
ONSET: ON-GOING

## 2024-09-15 VITALS
HEIGHT: 61 IN | WEIGHT: 104 LBS | RESPIRATION RATE: 16 BRPM | SYSTOLIC BLOOD PRESSURE: 107 MMHG | DIASTOLIC BLOOD PRESSURE: 69 MMHG | HEART RATE: 59 BPM | BODY MASS INDEX: 19.63 KG/M2 | OXYGEN SATURATION: 98 % | TEMPERATURE: 98.7 F

## 2024-09-15 LAB
ANION GAP SERPL CALC-SCNC: 12 MEQ/L (ref 8–16)
BASOPHILS ABSOLUTE: 0 THOU/MM3 (ref 0–0.1)
BASOPHILS NFR BLD AUTO: 0.7 %
BUN SERPL-MCNC: 7 MG/DL (ref 7–22)
CA-I BLD ISE-SCNC: 1.14 MMOL/L (ref 1.12–1.32)
CALCIUM SERPL-MCNC: 7.9 MG/DL (ref 8.5–10.5)
CHLORIDE SERPL-SCNC: 98 MEQ/L (ref 98–111)
CO2 SERPL-SCNC: 21 MEQ/L (ref 23–33)
CREAT SERPL-MCNC: 0.5 MG/DL (ref 0.4–1.2)
DEPRECATED RDW RBC AUTO: 39.6 FL (ref 35–45)
EOSINOPHIL NFR BLD AUTO: 3.7 %
EOSINOPHILS ABSOLUTE: 0.2 THOU/MM3 (ref 0–0.4)
ERYTHROCYTE [DISTWIDTH] IN BLOOD BY AUTOMATED COUNT: 11.6 % (ref 11.5–14.5)
GFR SERPL CREATININE-BSD FRML MDRD: > 90 ML/MIN/1.73M2
GLUCOSE BLD STRIP.AUTO-MCNC: 105 MG/DL (ref 70–108)
GLUCOSE BLD STRIP.AUTO-MCNC: 120 MG/DL (ref 70–108)
GLUCOSE SERPL-MCNC: 430 MG/DL (ref 70–108)
HCT VFR BLD AUTO: 30.9 % (ref 37–47)
HGB BLD-MCNC: 10.6 GM/DL (ref 12–16)
IMM GRANULOCYTES # BLD AUTO: 0.01 THOU/MM3 (ref 0–0.07)
IMM GRANULOCYTES NFR BLD AUTO: 0.2 %
LYMPHOCYTES ABSOLUTE: 1.8 THOU/MM3 (ref 1–4.8)
LYMPHOCYTES NFR BLD AUTO: 42.9 %
MAGNESIUM SERPL-MCNC: 2.1 MG/DL (ref 1.6–2.4)
MCH RBC QN AUTO: 32.4 PG (ref 26–33)
MCHC RBC AUTO-ENTMCNC: 34.3 GM/DL (ref 32.2–35.5)
MCV RBC AUTO: 94.5 FL (ref 81–99)
MONOCYTES ABSOLUTE: 0.5 THOU/MM3 (ref 0.4–1.3)
MONOCYTES NFR BLD AUTO: 11.1 %
NEUTROPHILS ABSOLUTE: 1.7 THOU/MM3 (ref 1.8–7.7)
NEUTROPHILS NFR BLD AUTO: 41.4 %
NRBC BLD AUTO-RTO: 0 /100 WBC
PHOSPHATE SERPL-MCNC: 6.9 MG/DL (ref 2.4–4.7)
PLATELET # BLD AUTO: 170 THOU/MM3 (ref 130–400)
PMV BLD AUTO: 10 FL (ref 9.4–12.4)
POTASSIUM SERPL-SCNC: 5.4 MEQ/L (ref 3.5–5.2)
RBC # BLD AUTO: 3.27 MILL/MM3 (ref 4.2–5.4)
SODIUM SERPL-SCNC: 131 MEQ/L (ref 135–145)
WBC # BLD AUTO: 4.1 THOU/MM3 (ref 4.8–10.8)

## 2024-09-15 PROCEDURE — 6370000000 HC RX 637 (ALT 250 FOR IP)

## 2024-09-15 PROCEDURE — 6360000002 HC RX W HCPCS

## 2024-09-15 PROCEDURE — 85025 COMPLETE CBC W/AUTO DIFF WBC: CPT

## 2024-09-15 PROCEDURE — 84100 ASSAY OF PHOSPHORUS: CPT

## 2024-09-15 PROCEDURE — 80048 BASIC METABOLIC PNL TOTAL CA: CPT

## 2024-09-15 PROCEDURE — 83735 ASSAY OF MAGNESIUM: CPT

## 2024-09-15 PROCEDURE — 99239 HOSP IP/OBS DSCHRG MGMT >30: CPT | Performed by: STUDENT IN AN ORGANIZED HEALTH CARE EDUCATION/TRAINING PROGRAM

## 2024-09-15 PROCEDURE — 82948 REAGENT STRIP/BLOOD GLUCOSE: CPT

## 2024-09-15 PROCEDURE — 82330 ASSAY OF CALCIUM: CPT

## 2024-09-15 PROCEDURE — 6370000000 HC RX 637 (ALT 250 FOR IP): Performed by: INTERNAL MEDICINE

## 2024-09-15 PROCEDURE — 36415 COLL VENOUS BLD VENIPUNCTURE: CPT

## 2024-09-15 PROCEDURE — 6360000002 HC RX W HCPCS: Performed by: STUDENT IN AN ORGANIZED HEALTH CARE EDUCATION/TRAINING PROGRAM

## 2024-09-15 RX ORDER — ONDANSETRON 4 MG/1
4 TABLET, ORALLY DISINTEGRATING ORAL EVERY 8 HOURS PRN
Qty: 30 TABLET | Refills: 0 | Status: ON HOLD
Start: 2024-09-15

## 2024-09-15 RX ORDER — POLYETHYLENE GLYCOL 3350 17 G/17G
17 POWDER, FOR SOLUTION ORAL DAILY PRN
Qty: 527 G | Refills: 1 | Status: ON HOLD
Start: 2024-09-15 | End: 2024-11-16

## 2024-09-15 RX ORDER — INSULIN LISPRO 100 [IU]/ML
0-4 INJECTION, SOLUTION INTRAVENOUS; SUBCUTANEOUS EVERY 6 HOURS
Qty: 10 ML | Refills: 0 | Status: ON HOLD
Start: 2024-09-15

## 2024-09-15 RX ORDER — OXYCODONE HYDROCHLORIDE 5 MG/1
5 TABLET ORAL EVERY 4 HOURS PRN
Qty: 18 TABLET | Refills: 0
Start: 2024-09-15 | End: 2024-09-18

## 2024-09-15 RX ORDER — MORPHINE SULFATE 2 MG/ML
2 INJECTION, SOLUTION INTRAMUSCULAR; INTRAVENOUS
Qty: 1 ML | Refills: 0
Start: 2024-09-15 | End: 2024-09-18

## 2024-09-15 RX ORDER — PROCHLORPERAZINE EDISYLATE 5 MG/ML
10 INJECTION INTRAMUSCULAR; INTRAVENOUS EVERY 6 HOURS PRN
Qty: 240 ML | Refills: 0 | Status: ON HOLD
Start: 2024-09-15 | End: 2024-09-29 | Stop reason: ALTCHOICE

## 2024-09-15 RX ADMIN — PROCHLORPERAZINE EDISYLATE 10 MG: 5 INJECTION INTRAMUSCULAR; INTRAVENOUS at 08:18

## 2024-09-15 RX ADMIN — OXYCODONE HYDROCHLORIDE 10 MG: 5 TABLET ORAL at 06:18

## 2024-09-15 RX ADMIN — MORPHINE SULFATE 4 MG: 4 INJECTION, SOLUTION INTRAMUSCULAR; INTRAVENOUS at 04:58

## 2024-09-15 RX ADMIN — ONDANSETRON 4 MG: 4 TABLET, ORALLY DISINTEGRATING ORAL at 06:18

## 2024-09-15 RX ADMIN — MORPHINE SULFATE 4 MG: 4 INJECTION, SOLUTION INTRAMUSCULAR; INTRAVENOUS at 08:19

## 2024-09-15 ASSESSMENT — PAIN SCALES - GENERAL
PAINLEVEL_OUTOF10: 0
PAINLEVEL_OUTOF10: 8
PAINLEVEL_OUTOF10: 6

## 2024-09-15 ASSESSMENT — PAIN DESCRIPTION - DESCRIPTORS
DESCRIPTORS: ACHING;CRAMPING
DESCRIPTORS: ACHING

## 2024-09-15 ASSESSMENT — PAIN SCALES - WONG BAKER
WONGBAKER_NUMERICALRESPONSE: NO HURT
WONGBAKER_NUMERICALRESPONSE: NO HURT

## 2024-09-15 ASSESSMENT — PAIN DESCRIPTION - ORIENTATION: ORIENTATION: LEFT

## 2024-09-15 ASSESSMENT — PAIN DESCRIPTION - LOCATION
LOCATION: ABDOMEN
LOCATION: ABDOMEN

## 2024-09-16 LAB
BACTERIA BLD AEROBE CULT: NORMAL
BACTERIA BLD AEROBE CULT: NORMAL

## 2024-09-29 ENCOUNTER — APPOINTMENT (OUTPATIENT)
Dept: GENERAL RADIOLOGY | Age: 39
DRG: 641 | End: 2024-09-29
Payer: COMMERCIAL

## 2024-09-29 ENCOUNTER — APPOINTMENT (OUTPATIENT)
Dept: CT IMAGING | Age: 39
DRG: 641 | End: 2024-09-29
Payer: COMMERCIAL

## 2024-09-29 ENCOUNTER — HOSPITAL ENCOUNTER (INPATIENT)
Age: 39
LOS: 8 days | Discharge: HOME HEALTH | DRG: 641 | End: 2024-10-09
Attending: EMERGENCY MEDICINE | Admitting: STUDENT IN AN ORGANIZED HEALTH CARE EDUCATION/TRAINING PROGRAM
Payer: COMMERCIAL

## 2024-09-29 DIAGNOSIS — R10.84 GENERALIZED ABDOMINAL PAIN: ICD-10-CM

## 2024-09-29 DIAGNOSIS — R10.9 INTRACTABLE ABDOMINAL PAIN: ICD-10-CM

## 2024-09-29 DIAGNOSIS — E44.0 MODERATE MALNUTRITION (HCC): Primary | ICD-10-CM

## 2024-09-29 PROBLEM — E46 MALNUTRITION, UNSPECIFIED TYPE (HCC): Status: ACTIVE | Noted: 2024-09-29

## 2024-09-29 LAB
ALBUMIN SERPL BCG-MCNC: 4.2 G/DL (ref 3.5–5.1)
ALP SERPL-CCNC: 66 U/L (ref 38–126)
ALT SERPL W/O P-5'-P-CCNC: 19 U/L (ref 11–66)
ANION GAP SERPL CALC-SCNC: 12 MEQ/L (ref 8–16)
AST SERPL-CCNC: 16 U/L (ref 5–40)
BASOPHILS ABSOLUTE: 0 THOU/MM3 (ref 0–0.1)
BASOPHILS NFR BLD AUTO: 0.8 %
BILIRUB SERPL-MCNC: 0.4 MG/DL (ref 0.3–1.2)
BUN SERPL-MCNC: 6 MG/DL (ref 7–22)
CALCIUM SERPL-MCNC: 8.8 MG/DL (ref 8.5–10.5)
CHLORIDE SERPL-SCNC: 105 MEQ/L (ref 98–111)
CO2 SERPL-SCNC: 20 MEQ/L (ref 23–33)
CREAT SERPL-MCNC: 0.7 MG/DL (ref 0.4–1.2)
DEPRECATED RDW RBC AUTO: 40.4 FL (ref 35–45)
EKG ATRIAL RATE: 82 BPM
EKG P AXIS: 85 DEGREES
EKG P-R INTERVAL: 152 MS
EKG Q-T INTERVAL: 360 MS
EKG QRS DURATION: 72 MS
EKG QTC CALCULATION (BAZETT): 420 MS
EKG R AXIS: 93 DEGREES
EKG T AXIS: 78 DEGREES
EKG VENTRICULAR RATE: 82 BPM
EOSINOPHIL NFR BLD AUTO: 1.6 %
EOSINOPHILS ABSOLUTE: 0.1 THOU/MM3 (ref 0–0.4)
ERYTHROCYTE [DISTWIDTH] IN BLOOD BY AUTOMATED COUNT: 12.1 % (ref 11.5–14.5)
GFR SERPL CREATININE-BSD FRML MDRD: > 90 ML/MIN/1.73M2
GLUCOSE BLD STRIP.AUTO-MCNC: 181 MG/DL (ref 70–108)
GLUCOSE BLD STRIP.AUTO-MCNC: 99 MG/DL (ref 70–108)
GLUCOSE SERPL-MCNC: 176 MG/DL (ref 70–108)
HCT VFR BLD AUTO: 35.3 % (ref 37–47)
HGB BLD-MCNC: 12 GM/DL (ref 12–16)
IMM GRANULOCYTES # BLD AUTO: 0.01 THOU/MM3 (ref 0–0.07)
IMM GRANULOCYTES NFR BLD AUTO: 0.2 %
LACTATE SERPL-SCNC: 0.9 MMOL/L (ref 0.5–2)
LIPASE SERPL-CCNC: 22.7 U/L (ref 5.6–51.3)
LYMPHOCYTES ABSOLUTE: 1.9 THOU/MM3 (ref 1–4.8)
LYMPHOCYTES NFR BLD AUTO: 38.6 %
MAGNESIUM SERPL-MCNC: 1.7 MG/DL (ref 1.6–2.4)
MCH RBC QN AUTO: 31.3 PG (ref 26–33)
MCHC RBC AUTO-ENTMCNC: 34 GM/DL (ref 32.2–35.5)
MCV RBC AUTO: 91.9 FL (ref 81–99)
MONOCYTES ABSOLUTE: 0.3 THOU/MM3 (ref 0.4–1.3)
MONOCYTES NFR BLD AUTO: 6.5 %
NEUTROPHILS ABSOLUTE: 2.6 THOU/MM3 (ref 1.8–7.7)
NEUTROPHILS NFR BLD AUTO: 52.3 %
NRBC BLD AUTO-RTO: 0 /100 WBC
OSMOLALITY SERPL CALC.SUM OF ELEC: 275.7 MOSMOL/KG (ref 275–300)
PLATELET # BLD AUTO: 288 THOU/MM3 (ref 130–400)
PMV BLD AUTO: 9.2 FL (ref 9.4–12.4)
POTASSIUM SERPL-SCNC: 3.5 MEQ/L (ref 3.5–5.2)
PROT SERPL-MCNC: 6.2 G/DL (ref 6.1–8)
RBC # BLD AUTO: 3.84 MILL/MM3 (ref 4.2–5.4)
SODIUM SERPL-SCNC: 137 MEQ/L (ref 135–145)
WBC # BLD AUTO: 4.9 THOU/MM3 (ref 4.8–10.8)

## 2024-09-29 PROCEDURE — 2580000003 HC RX 258: Performed by: EMERGENCY MEDICINE

## 2024-09-29 PROCEDURE — G0378 HOSPITAL OBSERVATION PER HR: HCPCS

## 2024-09-29 PROCEDURE — 83605 ASSAY OF LACTIC ACID: CPT

## 2024-09-29 PROCEDURE — 96372 THER/PROPH/DIAG INJ SC/IM: CPT

## 2024-09-29 PROCEDURE — 93010 ELECTROCARDIOGRAM REPORT: CPT | Performed by: NUCLEAR MEDICINE

## 2024-09-29 PROCEDURE — 99285 EMERGENCY DEPT VISIT HI MDM: CPT

## 2024-09-29 PROCEDURE — 82948 REAGENT STRIP/BLOOD GLUCOSE: CPT

## 2024-09-29 PROCEDURE — 74177 CT ABD & PELVIS W/CONTRAST: CPT

## 2024-09-29 PROCEDURE — 80053 COMPREHEN METABOLIC PANEL: CPT

## 2024-09-29 PROCEDURE — 85025 COMPLETE CBC W/AUTO DIFF WBC: CPT

## 2024-09-29 PROCEDURE — 83735 ASSAY OF MAGNESIUM: CPT

## 2024-09-29 PROCEDURE — 93005 ELECTROCARDIOGRAM TRACING: CPT | Performed by: EMERGENCY MEDICINE

## 2024-09-29 PROCEDURE — 2580000003 HC RX 258: Performed by: PHYSICIAN ASSISTANT

## 2024-09-29 PROCEDURE — 99223 1ST HOSP IP/OBS HIGH 75: CPT | Performed by: PHYSICIAN ASSISTANT

## 2024-09-29 PROCEDURE — 6360000002 HC RX W HCPCS: Performed by: PHYSICIAN ASSISTANT

## 2024-09-29 PROCEDURE — 6360000004 HC RX CONTRAST MEDICATION: Performed by: EMERGENCY MEDICINE

## 2024-09-29 PROCEDURE — 83690 ASSAY OF LIPASE: CPT

## 2024-09-29 PROCEDURE — 36415 COLL VENOUS BLD VENIPUNCTURE: CPT

## 2024-09-29 PROCEDURE — 71045 X-RAY EXAM CHEST 1 VIEW: CPT

## 2024-09-29 PROCEDURE — 6360000002 HC RX W HCPCS: Performed by: EMERGENCY MEDICINE

## 2024-09-29 PROCEDURE — 70450 CT HEAD/BRAIN W/O DYE: CPT

## 2024-09-29 PROCEDURE — 96374 THER/PROPH/DIAG INJ IV PUSH: CPT

## 2024-09-29 PROCEDURE — 6370000000 HC RX 637 (ALT 250 FOR IP): Performed by: PHYSICIAN ASSISTANT

## 2024-09-29 PROCEDURE — 96361 HYDRATE IV INFUSION ADD-ON: CPT

## 2024-09-29 RX ORDER — VALACYCLOVIR HYDROCHLORIDE 500 MG/1
1000 TABLET, FILM COATED ORAL DAILY
Status: DISCONTINUED | OUTPATIENT
Start: 2024-09-30 | End: 2024-10-09 | Stop reason: HOSPADM

## 2024-09-29 RX ORDER — PROMETHAZINE HYDROCHLORIDE 25 MG/ML
12.5 INJECTION, SOLUTION INTRAMUSCULAR; INTRAVENOUS ONCE
Status: COMPLETED | OUTPATIENT
Start: 2024-09-29 | End: 2024-09-29

## 2024-09-29 RX ORDER — ONDANSETRON 2 MG/ML
4 INJECTION INTRAMUSCULAR; INTRAVENOUS ONCE
Status: COMPLETED | OUTPATIENT
Start: 2024-09-29 | End: 2024-09-29

## 2024-09-29 RX ORDER — MAGNESIUM SULFATE IN WATER 40 MG/ML
2000 INJECTION, SOLUTION INTRAVENOUS PRN
Status: DISCONTINUED | OUTPATIENT
Start: 2024-09-29 | End: 2024-10-09 | Stop reason: HOSPADM

## 2024-09-29 RX ORDER — INSULIN LISPRO 100 [IU]/ML
0-4 INJECTION, SOLUTION INTRAVENOUS; SUBCUTANEOUS
Status: DISCONTINUED | OUTPATIENT
Start: 2024-09-29 | End: 2024-10-09 | Stop reason: HOSPADM

## 2024-09-29 RX ORDER — ACETAMINOPHEN 325 MG/1
650 TABLET ORAL EVERY 6 HOURS PRN
Status: DISCONTINUED | OUTPATIENT
Start: 2024-09-29 | End: 2024-10-09 | Stop reason: HOSPADM

## 2024-09-29 RX ORDER — ONDANSETRON 2 MG/ML
4 INJECTION INTRAMUSCULAR; INTRAVENOUS ONCE
Status: DISCONTINUED | OUTPATIENT
Start: 2024-09-29 | End: 2024-09-29

## 2024-09-29 RX ORDER — ACETAMINOPHEN 650 MG/1
650 SUPPOSITORY RECTAL EVERY 6 HOURS PRN
Status: DISCONTINUED | OUTPATIENT
Start: 2024-09-29 | End: 2024-10-09 | Stop reason: HOSPADM

## 2024-09-29 RX ORDER — PANTOPRAZOLE SODIUM 40 MG/1
40 TABLET, DELAYED RELEASE ORAL
Status: DISCONTINUED | OUTPATIENT
Start: 2024-09-30 | End: 2024-10-09 | Stop reason: HOSPADM

## 2024-09-29 RX ORDER — INSULIN LISPRO 100 [IU]/ML
0-4 INJECTION, SOLUTION INTRAVENOUS; SUBCUTANEOUS NIGHTLY
Status: DISCONTINUED | OUTPATIENT
Start: 2024-09-29 | End: 2024-10-09 | Stop reason: HOSPADM

## 2024-09-29 RX ORDER — OXYCODONE AND ACETAMINOPHEN 5; 325 MG/1; MG/1
2 TABLET ORAL EVERY 4 HOURS PRN
Status: ON HOLD | COMMUNITY
End: 2024-10-09 | Stop reason: HOSPADM

## 2024-09-29 RX ORDER — ONDANSETRON 2 MG/ML
4 INJECTION INTRAMUSCULAR; INTRAVENOUS EVERY 6 HOURS PRN
Status: DISCONTINUED | OUTPATIENT
Start: 2024-09-29 | End: 2024-10-09 | Stop reason: HOSPADM

## 2024-09-29 RX ORDER — DEXTROSE MONOHYDRATE 100 MG/ML
INJECTION, SOLUTION INTRAVENOUS CONTINUOUS PRN
Status: DISCONTINUED | OUTPATIENT
Start: 2024-09-29 | End: 2024-10-09 | Stop reason: HOSPADM

## 2024-09-29 RX ORDER — SODIUM CHLORIDE 0.9 % (FLUSH) 0.9 %
5-40 SYRINGE (ML) INJECTION EVERY 12 HOURS SCHEDULED
Status: DISCONTINUED | OUTPATIENT
Start: 2024-09-29 | End: 2024-10-09 | Stop reason: HOSPADM

## 2024-09-29 RX ORDER — MORPHINE SULFATE 2 MG/ML
2 INJECTION, SOLUTION INTRAMUSCULAR; INTRAVENOUS ONCE
Status: DISCONTINUED | OUTPATIENT
Start: 2024-09-29 | End: 2024-09-29

## 2024-09-29 RX ORDER — GLUCAGON 1 MG/ML
1 KIT INJECTION PRN
Status: DISCONTINUED | OUTPATIENT
Start: 2024-09-29 | End: 2024-10-09 | Stop reason: HOSPADM

## 2024-09-29 RX ORDER — POLYETHYLENE GLYCOL 3350 17 G/17G
17 POWDER, FOR SOLUTION ORAL DAILY PRN
Status: DISCONTINUED | OUTPATIENT
Start: 2024-09-29 | End: 2024-10-02

## 2024-09-29 RX ORDER — ATORVASTATIN CALCIUM 40 MG/1
40 TABLET, FILM COATED ORAL NIGHTLY
Status: DISCONTINUED | OUTPATIENT
Start: 2024-09-29 | End: 2024-10-09 | Stop reason: HOSPADM

## 2024-09-29 RX ORDER — IOPAMIDOL 755 MG/ML
80 INJECTION, SOLUTION INTRAVASCULAR
Status: COMPLETED | OUTPATIENT
Start: 2024-09-29 | End: 2024-09-29

## 2024-09-29 RX ORDER — ONDANSETRON 4 MG/1
4 TABLET, ORALLY DISINTEGRATING ORAL EVERY 8 HOURS PRN
Status: DISCONTINUED | OUTPATIENT
Start: 2024-09-29 | End: 2024-10-09 | Stop reason: HOSPADM

## 2024-09-29 RX ORDER — CLOPIDOGREL BISULFATE 75 MG/1
75 TABLET ORAL DAILY
Status: DISCONTINUED | OUTPATIENT
Start: 2024-09-30 | End: 2024-10-09 | Stop reason: HOSPADM

## 2024-09-29 RX ORDER — BUPROPION HYDROCHLORIDE 300 MG/1
300 TABLET ORAL EVERY OTHER DAY
Status: DISCONTINUED | OUTPATIENT
Start: 2024-09-30 | End: 2024-10-09 | Stop reason: HOSPADM

## 2024-09-29 RX ORDER — SUCRALFATE 1 G/1
1 TABLET ORAL
Status: DISCONTINUED | OUTPATIENT
Start: 2024-09-29 | End: 2024-10-09 | Stop reason: HOSPADM

## 2024-09-29 RX ORDER — POTASSIUM CHLORIDE 7.45 MG/ML
10 INJECTION INTRAVENOUS PRN
Status: DISCONTINUED | OUTPATIENT
Start: 2024-09-29 | End: 2024-10-09 | Stop reason: HOSPADM

## 2024-09-29 RX ORDER — SODIUM CHLORIDE 0.9 % (FLUSH) 0.9 %
5-40 SYRINGE (ML) INJECTION PRN
Status: DISCONTINUED | OUTPATIENT
Start: 2024-09-29 | End: 2024-10-09 | Stop reason: HOSPADM

## 2024-09-29 RX ORDER — 0.9 % SODIUM CHLORIDE 0.9 %
1000 INTRAVENOUS SOLUTION INTRAVENOUS ONCE
Status: COMPLETED | OUTPATIENT
Start: 2024-09-29 | End: 2024-09-29

## 2024-09-29 RX ORDER — SUCRALFATE 1 G/1
1 TABLET ORAL 4 TIMES DAILY
COMMUNITY

## 2024-09-29 RX ORDER — FENTANYL CITRATE 50 UG/ML
25 INJECTION, SOLUTION INTRAMUSCULAR; INTRAVENOUS ONCE
Status: COMPLETED | OUTPATIENT
Start: 2024-09-29 | End: 2024-09-29

## 2024-09-29 RX ORDER — POTASSIUM CHLORIDE 1500 MG/1
40 TABLET, EXTENDED RELEASE ORAL PRN
Status: DISCONTINUED | OUTPATIENT
Start: 2024-09-29 | End: 2024-10-09 | Stop reason: HOSPADM

## 2024-09-29 RX ORDER — SODIUM CHLORIDE 9 MG/ML
INJECTION, SOLUTION INTRAVENOUS PRN
Status: DISCONTINUED | OUTPATIENT
Start: 2024-09-29 | End: 2024-10-09 | Stop reason: HOSPADM

## 2024-09-29 RX ORDER — ENOXAPARIN SODIUM 100 MG/ML
40 INJECTION SUBCUTANEOUS EVERY 24 HOURS
Status: DISCONTINUED | OUTPATIENT
Start: 2024-09-29 | End: 2024-09-30 | Stop reason: SDUPTHER

## 2024-09-29 RX ADMIN — SUCRALFATE 1 G: 1 TABLET ORAL at 20:12

## 2024-09-29 RX ADMIN — HYDROMORPHONE HYDROCHLORIDE 0.5 MG: 1 INJECTION, SOLUTION INTRAMUSCULAR; INTRAVENOUS; SUBCUTANEOUS at 21:45

## 2024-09-29 RX ADMIN — PROMETHAZINE HYDROCHLORIDE 12.5 MG: 25 INJECTION INTRAMUSCULAR; INTRAVENOUS at 16:06

## 2024-09-29 RX ADMIN — FENTANYL CITRATE 25 MCG: 50 INJECTION, SOLUTION INTRAMUSCULAR; INTRAVENOUS at 16:08

## 2024-09-29 RX ADMIN — SODIUM CHLORIDE, PRESERVATIVE FREE 10 ML: 5 INJECTION INTRAVENOUS at 20:11

## 2024-09-29 RX ADMIN — ONDANSETRON 4 MG: 2 INJECTION INTRAMUSCULAR; INTRAVENOUS at 17:28

## 2024-09-29 RX ADMIN — SODIUM CHLORIDE 1000 ML: 9 INJECTION, SOLUTION INTRAVENOUS at 16:09

## 2024-09-29 RX ADMIN — ATORVASTATIN CALCIUM 40 MG: 40 TABLET, FILM COATED ORAL at 20:12

## 2024-09-29 RX ADMIN — HYDROMORPHONE HYDROCHLORIDE 0.5 MG: 1 INJECTION, SOLUTION INTRAMUSCULAR; INTRAVENOUS; SUBCUTANEOUS at 17:29

## 2024-09-29 RX ADMIN — IOPAMIDOL 80 ML: 755 INJECTION, SOLUTION INTRAVENOUS at 15:50

## 2024-09-29 RX ADMIN — ENOXAPARIN SODIUM 40 MG: 100 INJECTION SUBCUTANEOUS at 20:11

## 2024-09-29 ASSESSMENT — PAIN DESCRIPTION - LOCATION
LOCATION: ABDOMEN

## 2024-09-29 ASSESSMENT — PAIN DESCRIPTION - FREQUENCY: FREQUENCY: INTERMITTENT

## 2024-09-29 ASSESSMENT — PAIN DESCRIPTION - ORIENTATION
ORIENTATION: ANTERIOR;UPPER;MID
ORIENTATION: ANTERIOR;MID;UPPER
ORIENTATION: UPPER;MID;LEFT

## 2024-09-29 ASSESSMENT — PAIN SCALES - GENERAL
PAINLEVEL_OUTOF10: 8
PAINLEVEL_OUTOF10: 5
PAINLEVEL_OUTOF10: 4
PAINLEVEL_OUTOF10: 7

## 2024-09-29 ASSESSMENT — PAIN - FUNCTIONAL ASSESSMENT
PAIN_FUNCTIONAL_ASSESSMENT: ACTIVITIES ARE NOT PREVENTED
PAIN_FUNCTIONAL_ASSESSMENT: 0-10
PAIN_FUNCTIONAL_ASSESSMENT: ACTIVITIES ARE NOT PREVENTED

## 2024-09-29 ASSESSMENT — PAIN DESCRIPTION - DESCRIPTORS
DESCRIPTORS: ACHING;CRAMPING
DESCRIPTORS: ACHING
DESCRIPTORS: STABBING

## 2024-09-29 ASSESSMENT — PAIN DESCRIPTION - PAIN TYPE: TYPE: ACUTE PAIN

## 2024-09-29 ASSESSMENT — PAIN DESCRIPTION - ONSET: ONSET: ON-GOING

## 2024-09-29 NOTE — H&P
Hospitalist History & Physical    Patient:  Ana Puente    Unit/Bed:5K-03/003-A  YOB: 1985  MRN: [id number]   PCP: Usha Amaya MD  Code Status: Full Code    Date of Service: The patient was seen and examined on 09/29/24 and admitted to Observation with an expected length of stay of less than two midnights due to medical therapy.     Chief Complaint: Abdominal pain, unable to tolerate oral intake    Assessment/Plan:    Intractable abdominal pain  Etiology unclear.  Has undergone extensive workup locally and at [facility].  History of celiac disease and superior manage enteric artery syndrome.  Has been unable to tolerate oral intake for last few months.  Endorses weight loss.  Gastroenterology consulted in emergency department.  Recommend admission for initiation of TPN.  Dilaudid as needed for pain.  Pharmacy and dietitian consulted for TPN management.  Currently on Creon, Protonix, Carafate.  Previously trialed on rifaximin for possible SIBO at [facility] and [facility], but patient denies significant symptomatic improvement with these.  Hyperglycemia  Patient denies history of diabetes.  States she is only hyperglycemic while on TPN.  Accu-Cheks and low-dose sliding scale ordered.  History of stroke  No etiology identified.  Continue Plavix, Lipitor  Celiac disease  Gluten-free diet.  Unspecified anxiety/depression  Continue Wellbutrin  Marijuana use  Encouraged cessation    History of Present Illness:  Ana Puente is a 39 y.o. female with a history of celiac disease, superior mesenteric artery syndrome, stroke, anxiety/depression, and marijuana use who presented to [facility] with chief complaint of abdominal pain and inability to tolerate oral intake.  The patient has a recent history of intractable abdominal pain with inability to tolerate oral intake.  She states her symptoms have been ongoing since about July and she has lost a significant amount of weight since then due to

## 2024-09-29 NOTE — ED NOTES
ED to inpatient nurses report      Chief Complaint:  Chief Complaint   Patient presents with    Fatigue    Reduced Urine Output    Dehydration     Present to ED from: [facility]    MOA:     LOC: alert and orientated to name, place, date  Mobility: Requires assistance * 1  Oxygen Baseline: room air    Current needs required: none     Code Status:   Prior    What abnormal results were found and what did you give/do to treat them? malnutrition  Any procedures or intervention occur? fluids    Mental Status:  Level of Consciousness: Alert (0)    Psych Assessment:        Vitals:  Patient Vitals for the past 24 hrs:   BP Temp Temp src Pulse Resp SpO2 Height Weight   09/29/24 1609 113/80 -- -- 60 16 98 % -- --   09/29/24 1337 -- 97.8 °F (36.6 °C) Oral 80 18 100 % 1.524 m (5') 56.7 kg (125 lb)        LDAs:      Ambulatory Status:  No data recorded    Diagnosis:  DISPOSITION Admitted 09/29/2024 05:09:13 PM   Final diagnoses:   Moderate malnutrition (HCC)   Generalized abdominal pain        Consults:  IP CONSULT TO GI     Pain Score:  Pain Assessment  Pain Assessment: 0-10  Pain Level: 7    C-SSRS:   Risk of Suicide: No Risk    Sepsis Screening:       Finley Fall Risk:       Swallow Screening        Preferred Language:   English      ALLERGIES     Latex, Metoclopramide, Peanuts [peanut oil], Gluten meal, Imitrex [sumatriptan], Toradol [ketorolac tromethamine], and Tramadol    SURGICAL HISTORY       Past Surgical History:   Procedure Laterality Date    ANTERIOR CRUCIATE LIGAMENT REPAIR      APPENDECTOMY      CHOLECYSTECTOMY      CLAVICLE EXCISION Left     COLONOSCOPY  2002    Dr. Dahl    HYSTERECTOMY, TOTAL ABDOMINAL (CERVIX REMOVED)      partial maintains ovaries    INCISIONAL HERNIA REPAIR Right 08/22/2014    [facility]    LAPAROSCOPY      LAPAROSCOPY  03/07/2014    Robotic Assisted-Lysis of Adhesions-drainage of ovarian cyst    OTHER SURGICAL HISTORY      procedure for superior mesenteric artery syndrome    OTHER SURGICAL HISTORY

## 2024-09-29 NOTE — ED PROVIDER NOTES
[facility]  EMERGENCY DEPARTMENT ENCOUNTER          Pt Name: Ana Puente  MRN: [id number]  Birthdate 1985  Date of evaluation: 9/29/2024  Emergency Physician: Karen Villalba MD    CHIEF COMPLAINT       Chief Complaint   Patient presents with    Fatigue    Reduced Urine Output    Dehydration     History obtained from the patient.      HISTORY OF PRESENT ILLNESS    HPI  Ana Puente is a 39 y.o. female who presents to the emergency department for evaluation of nausea, dizziness, fatigue, confusion, and generalized weakness.  Patient states that she has been worked up for malnutrition and TPN has been recommended.  She had an appointment with Dr. Dahl, but missed the appointment because she was not feeling well.  Patient states the next available appointment in [place] is not until April 2025.  Patient states she has generalized weakness, fatigue, she gets dizzy and weak when she stands.  Patient also relates that she is having altered mental status and is confused.  Patient also relates her blood sugar has been high and low.  L  The patient has no other acute complaints at this time.    REVIEW OF SYSTEMS   Review of Systems   Constitutional:  Positive for fatigue. Negative for chills and fever.   HENT:  Negative for ear pain and sore throat.    Respiratory:  Negative for cough and shortness of breath.    Cardiovascular:  Negative for chest pain and palpitations.   Gastrointestinal:  Positive for abdominal pain and nausea. Negative for diarrhea and vomiting.   Genitourinary:  Negative for dysuria.   Musculoskeletal:  Negative for back pain.   Skin:  Negative for rash and wound.   Allergic/Immunologic: Negative for immunocompromised state.   Neurological:  Positive for dizziness and light-headedness. Negative for weakness and headaches.   Psychiatric/Behavioral:  Positive for confusion.          PAST MEDICAL AND SURGICAL HISTORY     Past Medical History:   Diagnosis Date

## 2024-09-29 NOTE — ED TRIAGE NOTES
[facility] with [facility]/o weakness, abdominal pain and dehydration. Pt states she sees Dr. Dahl and states today her blood sugar has been all over the place at home, weakness and has not been able to ear. Pt blood sugar 181 at this time. Pt states she has had a EGD recently and states she is was recommended to get TPN. Pt states pain is a 7 out of 0-10 at this time.

## 2024-09-30 LAB
ANION GAP SERPL CALC-SCNC: 13 MEQ/L (ref 8–16)
BASOPHILS ABSOLUTE: 0 THOU/MM3 (ref 0–0.1)
BASOPHILS NFR BLD AUTO: 1 %
BILIRUB UR QL STRIP.AUTO: NEGATIVE
BUN SERPL-MCNC: 4 MG/DL (ref 7–22)
CA-I BLD ISE-SCNC: 1.27 MMOL/L (ref 1.12–1.32)
CALCIUM SERPL-MCNC: 8.3 MG/DL (ref 8.5–10.5)
CHARACTER UR: CLEAR
CHLORIDE SERPL-SCNC: 106 MEQ/L (ref 98–111)
CO2 SERPL-SCNC: 21 MEQ/L (ref 23–33)
COLOR, UA: YELLOW
CREAT SERPL-MCNC: 0.7 MG/DL (ref 0.4–1.2)
DEPRECATED RDW RBC AUTO: 42.4 FL (ref 35–45)
EOSINOPHIL NFR BLD AUTO: 2.5 %
EOSINOPHILS ABSOLUTE: 0.1 THOU/MM3 (ref 0–0.4)
ERYTHROCYTE [DISTWIDTH] IN BLOOD BY AUTOMATED COUNT: 12.3 % (ref 11.5–14.5)
GFR SERPL CREATININE-BSD FRML MDRD: > 90 ML/MIN/1.73M2
GLUCOSE BLD STRIP.AUTO-MCNC: 112 MG/DL (ref 70–108)
GLUCOSE BLD STRIP.AUTO-MCNC: 62 MG/DL (ref 70–108)
GLUCOSE BLD STRIP.AUTO-MCNC: 81 MG/DL (ref 70–108)
GLUCOSE BLD STRIP.AUTO-MCNC: 83 MG/DL (ref 70–108)
GLUCOSE SERPL-MCNC: 143 MG/DL (ref 70–108)
GLUCOSE UR QL STRIP.AUTO: NEGATIVE MG/DL
HCT VFR BLD AUTO: 31.3 % (ref 37–47)
HGB BLD-MCNC: 10.5 GM/DL (ref 12–16)
HGB UR QL STRIP.AUTO: NEGATIVE
IMM GRANULOCYTES # BLD AUTO: 0 THOU/MM3 (ref 0–0.07)
IMM GRANULOCYTES NFR BLD AUTO: 0 %
KETONES UR QL STRIP.AUTO: ABNORMAL
LYMPHOCYTES ABSOLUTE: 1.9 THOU/MM3 (ref 1–4.8)
LYMPHOCYTES NFR BLD AUTO: 46.9 %
MAGNESIUM SERPL-MCNC: 1.7 MG/DL (ref 1.6–2.4)
MCH RBC QN AUTO: 31.7 PG (ref 26–33)
MCHC RBC AUTO-ENTMCNC: 33.5 GM/DL (ref 32.2–35.5)
MCV RBC AUTO: 94.6 FL (ref 81–99)
MONOCYTES ABSOLUTE: 0.3 THOU/MM3 (ref 0.4–1.3)
MONOCYTES NFR BLD AUTO: 7.2 %
NEUTROPHILS ABSOLUTE: 1.7 THOU/MM3 (ref 1.8–7.7)
NEUTROPHILS NFR BLD AUTO: 42.4 %
NITRITE UR QL STRIP: NEGATIVE
NRBC BLD AUTO-RTO: 0 /100 WBC
PH UR STRIP.AUTO: 5.5 [PH] (ref 5–9)
PHOSPHATE SERPL-MCNC: 3.7 MG/DL (ref 2.4–4.7)
PLATELET # BLD AUTO: 233 THOU/MM3 (ref 130–400)
PMV BLD AUTO: 9.4 FL (ref 9.4–12.4)
POTASSIUM SERPL-SCNC: 3.3 MEQ/L (ref 3.5–5.2)
PROT UR STRIP.AUTO-MCNC: NEGATIVE MG/DL
RBC # BLD AUTO: 3.31 MILL/MM3 (ref 4.2–5.4)
SODIUM SERPL-SCNC: 140 MEQ/L (ref 135–145)
SP GR UR REFRACT.AUTO: > 1.03 (ref 1–1.03)
UROBILINOGEN, URINE: 1 EU/DL (ref 0–1)
WBC # BLD AUTO: 4 THOU/MM3 (ref 4.8–10.8)
WBC #/AREA URNS HPF: NEGATIVE /[HPF]

## 2024-09-30 PROCEDURE — 6360000002 HC RX W HCPCS: Performed by: PHYSICIAN ASSISTANT

## 2024-09-30 PROCEDURE — 81003 URINALYSIS AUTO W/O SCOPE: CPT

## 2024-09-30 PROCEDURE — 2500000003 HC RX 250 WO HCPCS: Performed by: PHYSICIAN ASSISTANT

## 2024-09-30 PROCEDURE — 6370000000 HC RX 637 (ALT 250 FOR IP): Performed by: PHYSICIAN ASSISTANT

## 2024-09-30 PROCEDURE — 82948 REAGENT STRIP/BLOOD GLUCOSE: CPT

## 2024-09-30 PROCEDURE — 2580000003 HC RX 258: Performed by: PHYSICIAN ASSISTANT

## 2024-09-30 PROCEDURE — 85025 COMPLETE CBC W/AUTO DIFF WBC: CPT

## 2024-09-30 PROCEDURE — 84100 ASSAY OF PHOSPHORUS: CPT

## 2024-09-30 PROCEDURE — 80048 BASIC METABOLIC PNL TOTAL CA: CPT

## 2024-09-30 PROCEDURE — 99232 SBSQ HOSP IP/OBS MODERATE 35: CPT | Performed by: PHYSICIAN ASSISTANT

## 2024-09-30 PROCEDURE — G0378 HOSPITAL OBSERVATION PER HR: HCPCS

## 2024-09-30 PROCEDURE — 36415 COLL VENOUS BLD VENIPUNCTURE: CPT

## 2024-09-30 PROCEDURE — 82330 ASSAY OF CALCIUM: CPT

## 2024-09-30 PROCEDURE — 83735 ASSAY OF MAGNESIUM: CPT

## 2024-09-30 RX ORDER — PROMETHAZINE HYDROCHLORIDE 25 MG/1
25 TABLET ORAL EVERY 6 HOURS PRN
Status: DISCONTINUED | OUTPATIENT
Start: 2024-09-30 | End: 2024-10-09 | Stop reason: HOSPADM

## 2024-09-30 RX ORDER — ENOXAPARIN SODIUM 100 MG/ML
30 INJECTION SUBCUTANEOUS EVERY 24 HOURS
Status: DISCONTINUED | OUTPATIENT
Start: 2024-09-30 | End: 2024-10-04 | Stop reason: SDUPTHER

## 2024-09-30 RX ADMIN — POTASSIUM CHLORIDE 40 MEQ: 1500 TABLET, EXTENDED RELEASE ORAL at 08:36

## 2024-09-30 RX ADMIN — SUCRALFATE 1 G: 1 TABLET ORAL at 16:54

## 2024-09-30 RX ADMIN — SUCRALFATE 1 G: 1 TABLET ORAL at 10:45

## 2024-09-30 RX ADMIN — ONDANSETRON 4 MG: 2 INJECTION INTRAMUSCULAR; INTRAVENOUS at 16:54

## 2024-09-30 RX ADMIN — PANCRELIPASE LIPASE, PANCRELIPASE PROTEASE, PANCRELIPASE AMYLASE 5000 UNITS: 5000; 17000; 24000 CAPSULE, DELAYED RELEASE ORAL at 08:41

## 2024-09-30 RX ADMIN — HYDROMORPHONE HYDROCHLORIDE 0.5 MG: 1 INJECTION, SOLUTION INTRAMUSCULAR; INTRAVENOUS; SUBCUTANEOUS at 08:36

## 2024-09-30 RX ADMIN — HYDROMORPHONE HYDROCHLORIDE 0.5 MG: 1 INJECTION, SOLUTION INTRAMUSCULAR; INTRAVENOUS; SUBCUTANEOUS at 11:50

## 2024-09-30 RX ADMIN — ATORVASTATIN CALCIUM 40 MG: 40 TABLET, FILM COATED ORAL at 20:59

## 2024-09-30 RX ADMIN — BUPROPION HYDROCHLORIDE 300 MG: 300 TABLET, EXTENDED RELEASE ORAL at 08:36

## 2024-09-30 RX ADMIN — ONDANSETRON 4 MG: 2 INJECTION INTRAMUSCULAR; INTRAVENOUS at 10:45

## 2024-09-30 RX ADMIN — VALACYCLOVIR HYDROCHLORIDE 1000 MG: 500 TABLET, FILM COATED ORAL at 08:36

## 2024-09-30 RX ADMIN — HYDROMORPHONE HYDROCHLORIDE 0.5 MG: 1 INJECTION, SOLUTION INTRAMUSCULAR; INTRAVENOUS; SUBCUTANEOUS at 21:43

## 2024-09-30 RX ADMIN — PANCRELIPASE LIPASE, PANCRELIPASE PROTEASE, PANCRELIPASE AMYLASE 5000 UNITS: 5000; 17000; 24000 CAPSULE, DELAYED RELEASE ORAL at 12:43

## 2024-09-30 RX ADMIN — SUCRALFATE 1 G: 1 TABLET ORAL at 06:37

## 2024-09-30 RX ADMIN — ONDANSETRON 4 MG: 2 INJECTION INTRAMUSCULAR; INTRAVENOUS at 04:22

## 2024-09-30 RX ADMIN — PANTOPRAZOLE SODIUM 40 MG: 40 TABLET, DELAYED RELEASE ORAL at 06:37

## 2024-09-30 RX ADMIN — HYDROMORPHONE HYDROCHLORIDE 0.5 MG: 1 INJECTION, SOLUTION INTRAMUSCULAR; INTRAVENOUS; SUBCUTANEOUS at 04:23

## 2024-09-30 RX ADMIN — SUCRALFATE 1 G: 1 TABLET ORAL at 19:57

## 2024-09-30 RX ADMIN — SODIUM CHLORIDE: 234 INJECTION INTRAMUSCULAR; INTRAVENOUS; SUBCUTANEOUS at 18:46

## 2024-09-30 RX ADMIN — HYDROMORPHONE HYDROCHLORIDE 0.5 MG: 1 INJECTION, SOLUTION INTRAMUSCULAR; INTRAVENOUS; SUBCUTANEOUS at 15:12

## 2024-09-30 RX ADMIN — SODIUM CHLORIDE, PRESERVATIVE FREE 10 ML: 5 INJECTION INTRAVENOUS at 08:36

## 2024-09-30 RX ADMIN — PANCRELIPASE LIPASE, PANCRELIPASE PROTEASE, PANCRELIPASE AMYLASE 5000 UNITS: 5000; 17000; 24000 CAPSULE, DELAYED RELEASE ORAL at 16:54

## 2024-09-30 RX ADMIN — ENOXAPARIN SODIUM 30 MG: 100 INJECTION SUBCUTANEOUS at 19:57

## 2024-09-30 RX ADMIN — CLOPIDOGREL BISULFATE 75 MG: 75 TABLET ORAL at 08:36

## 2024-09-30 RX ADMIN — HYDROMORPHONE HYDROCHLORIDE 0.5 MG: 1 INJECTION, SOLUTION INTRAMUSCULAR; INTRAVENOUS; SUBCUTANEOUS at 18:38

## 2024-09-30 ASSESSMENT — PAIN DESCRIPTION - LOCATION
LOCATION: ABDOMEN

## 2024-09-30 ASSESSMENT — PAIN SCALES - GENERAL
PAINLEVEL_OUTOF10: 7
PAINLEVEL_OUTOF10: 8
PAINLEVEL_OUTOF10: 7
PAINLEVEL_OUTOF10: 7

## 2024-09-30 ASSESSMENT — PAIN DESCRIPTION - DESCRIPTORS
DESCRIPTORS: STABBING
DESCRIPTORS: STABBING
DESCRIPTORS: STABBING;SHARP

## 2024-09-30 ASSESSMENT — PAIN - FUNCTIONAL ASSESSMENT: PAIN_FUNCTIONAL_ASSESSMENT: ACTIVITIES ARE NOT PREVENTED

## 2024-09-30 ASSESSMENT — PAIN DESCRIPTION - ORIENTATION
ORIENTATION: LEFT
ORIENTATION: LEFT;UPPER
ORIENTATION: UPPER;MID;LEFT

## 2024-09-30 NOTE — CARE COORDINATION
Case Management Assessment Initial Evaluation    Date/Time of Evaluation: 9/30/2024 4:56 PM  Assessment Completed by: Lizbet Sykes RN    If patient is discharged prior to next notation, then this note serves as note for discharge by case management.    Patient Name: Ana Paredes                   YOB: 1985  Diagnosis: Generalized abdominal pain [R10.84]  Moderate malnutrition (HCC) [E44.0]  Malnutrition, unspecified type (HCC) [E46]                   Date / Time: 9/29/2024  1:21 PM  Location: [facility]03/003     Patient Admission Status: Observation   Readmission Risk Low 0-14, Mod 15-19), High > 20: Readmission Risk Score: 12.3    Current PCP: Usha Amaya MD  Health Care Decision Makers:   Primary Decision Maker: EMANUEL PAREDES - Spouse - [phone]    Additional Case Management Notes: Presented to ED with abdominal pain and poor oral intake. Significant amount of weight loss since July with multiple visits for the same issue. Imaging is again negative. Patient had PICC placed last admission for TPN, and was transferred to [facility], where she ended up leaving [place] to come back to [place].     Procedures: n/a     Imaging: No acute, large retained stool only.     Patient Goals/Plan/Treatment Preferences: Ana plans to return home with . Anticipated home infusion for TPN with HH at discharge. 1st choices are [facility] for HH and [facility] in [place] for Home infusion supplies. 2nd choices are to go down the list for acceptance. Reports issues with insurance coverage of HH and HI in [place]. Team will follow needs with GI consult and progressing clinical course.      09/30/24 4201   Service Assessment   Patient Orientation Alert and Oriented   Cognition Alert   History Provided By Patient   Primary Caregiver Self   Accompanied By/Relationship no one   Support Systems Spouse/Significant Other;Family Members   Patient's Healthcare Decision Maker is: Patient Declined (Legal Next of Kin

## 2024-09-30 NOTE — CARE COORDINATION
Patient was transferred to [facility] at discharge. N/a.      09/30/24 1436   Readmission Assessment   Number of Days since last admission? 8-30 days   Previous Disposition Other (comment)  (transferred to OSH, reportedly left AMA)   Who is being Interviewed Patient  (medical record. Patient interview n/a, transferred to  and left AMA)   What was the patient's/caregiver's perception as to why they think they needed to return back to the hospital? Other (Comment)  (pain)   Did you visit your Primary Care Physician after you left the hospital, before you returned this time? Yes   Did you see a specialist, such as Cardiac, Pulmonary, Orthopedic Physician, etc. after you left the hospital? Yes   Who advised the patient to return to the hospital? Self-referral   Does the patient report anything that got in the way of taking their medications? No  (n/a left AMA after transfer)   In our efforts to provide the best possible care to you and others like you, can you think of anything that we could have done to help you after you left the hospital the first time, so that you might not have needed to return so soon? Other (Comment)  (n/a, patient was transferred to [facility])

## 2024-09-30 NOTE — CONSULTS
Patient seen evaluated for consult to follow patient will be started on TPN soon KUB in the morning to rule out remaining contrast

## 2024-10-01 ENCOUNTER — APPOINTMENT (OUTPATIENT)
Dept: GENERAL RADIOLOGY | Age: 39
DRG: 641 | End: 2024-10-01
Payer: COMMERCIAL

## 2024-10-01 PROBLEM — R10.9 INTRACTABLE ABDOMINAL PAIN: Status: ACTIVE | Noted: 2024-10-01

## 2024-10-01 LAB
ANION GAP SERPL CALC-SCNC: 7 MEQ/L (ref 8–16)
BUN SERPL-MCNC: 7 MG/DL (ref 7–22)
CA-I BLD ISE-SCNC: 1.22 MMOL/L (ref 1.12–1.32)
CALCIUM SERPL-MCNC: 8 MG/DL (ref 8.5–10.5)
CHLORIDE SERPL-SCNC: 107 MEQ/L (ref 98–111)
CO2 SERPL-SCNC: 24 MEQ/L (ref 23–33)
CREAT SERPL-MCNC: 0.6 MG/DL (ref 0.4–1.2)
DEPRECATED RDW RBC AUTO: 42.7 FL (ref 35–45)
ERYTHROCYTE [DISTWIDTH] IN BLOOD BY AUTOMATED COUNT: 12.3 % (ref 11.5–14.5)
GFR SERPL CREATININE-BSD FRML MDRD: > 90 ML/MIN/1.73M2
GLUCOSE BLD STRIP.AUTO-MCNC: 87 MG/DL (ref 70–108)
GLUCOSE BLD STRIP.AUTO-MCNC: 89 MG/DL (ref 70–108)
GLUCOSE BLD STRIP.AUTO-MCNC: 91 MG/DL (ref 70–108)
GLUCOSE BLD STRIP.AUTO-MCNC: 97 MG/DL (ref 70–108)
GLUCOSE SERPL-MCNC: 91 MG/DL (ref 70–108)
HCT VFR BLD AUTO: 31 % (ref 37–47)
HGB BLD-MCNC: 10.4 GM/DL (ref 12–16)
MAGNESIUM SERPL-MCNC: 1.8 MG/DL (ref 1.6–2.4)
MCH RBC QN AUTO: 31.8 PG (ref 26–33)
MCHC RBC AUTO-ENTMCNC: 33.5 GM/DL (ref 32.2–35.5)
MCV RBC AUTO: 94.8 FL (ref 81–99)
PHOSPHATE SERPL-MCNC: 4.4 MG/DL (ref 2.4–4.7)
PLATELET # BLD AUTO: 208 THOU/MM3 (ref 130–400)
PMV BLD AUTO: 9.4 FL (ref 9.4–12.4)
POTASSIUM SERPL-SCNC: 4.5 MEQ/L (ref 3.5–5.2)
RBC # BLD AUTO: 3.27 MILL/MM3 (ref 4.2–5.4)
SODIUM SERPL-SCNC: 138 MEQ/L (ref 135–145)
WBC # BLD AUTO: 6.1 THOU/MM3 (ref 4.8–10.8)

## 2024-10-01 PROCEDURE — 74018 RADEX ABDOMEN 1 VIEW: CPT

## 2024-10-01 PROCEDURE — 1200000000 HC SEMI PRIVATE

## 2024-10-01 PROCEDURE — 36415 COLL VENOUS BLD VENIPUNCTURE: CPT

## 2024-10-01 PROCEDURE — 2580000003 HC RX 258

## 2024-10-01 PROCEDURE — 82330 ASSAY OF CALCIUM: CPT

## 2024-10-01 PROCEDURE — 2580000003 HC RX 258: Performed by: PHYSICIAN ASSISTANT

## 2024-10-01 PROCEDURE — 6370000000 HC RX 637 (ALT 250 FOR IP): Performed by: PHYSICIAN ASSISTANT

## 2024-10-01 PROCEDURE — 85027 COMPLETE CBC AUTOMATED: CPT

## 2024-10-01 PROCEDURE — 84100 ASSAY OF PHOSPHORUS: CPT

## 2024-10-01 PROCEDURE — 2500000003 HC RX 250 WO HCPCS: Performed by: PHYSICIAN ASSISTANT

## 2024-10-01 PROCEDURE — 99232 SBSQ HOSP IP/OBS MODERATE 35: CPT | Performed by: PHYSICIAN ASSISTANT

## 2024-10-01 PROCEDURE — 82948 REAGENT STRIP/BLOOD GLUCOSE: CPT

## 2024-10-01 PROCEDURE — 83735 ASSAY OF MAGNESIUM: CPT

## 2024-10-01 PROCEDURE — 6360000002 HC RX W HCPCS: Performed by: PHYSICIAN ASSISTANT

## 2024-10-01 PROCEDURE — 80048 BASIC METABOLIC PNL TOTAL CA: CPT

## 2024-10-01 RX ORDER — 0.9 % SODIUM CHLORIDE 0.9 %
1000 INTRAVENOUS SOLUTION INTRAVENOUS ONCE
Status: COMPLETED | OUTPATIENT
Start: 2024-10-01 | End: 2024-10-01

## 2024-10-01 RX ORDER — 0.9 % SODIUM CHLORIDE 0.9 %
1000 INTRAVENOUS SOLUTION INTRAVENOUS ONCE
Status: DISCONTINUED | OUTPATIENT
Start: 2024-10-01 | End: 2024-10-01

## 2024-10-01 RX ORDER — SODIUM CHLORIDE 9 MG/ML
INJECTION, SOLUTION INTRAVENOUS CONTINUOUS
Status: DISCONTINUED | OUTPATIENT
Start: 2024-10-01 | End: 2024-10-09 | Stop reason: HOSPADM

## 2024-10-01 RX ADMIN — CLOPIDOGREL BISULFATE 75 MG: 75 TABLET ORAL at 08:22

## 2024-10-01 RX ADMIN — ONDANSETRON 4 MG: 2 INJECTION INTRAMUSCULAR; INTRAVENOUS at 17:51

## 2024-10-01 RX ADMIN — HYDROMORPHONE HYDROCHLORIDE 0.5 MG: 1 INJECTION, SOLUTION INTRAMUSCULAR; INTRAVENOUS; SUBCUTANEOUS at 00:51

## 2024-10-01 RX ADMIN — ATORVASTATIN CALCIUM 40 MG: 40 TABLET, FILM COATED ORAL at 20:42

## 2024-10-01 RX ADMIN — SODIUM CHLORIDE 1000 ML: 9 INJECTION, SOLUTION INTRAVENOUS at 04:02

## 2024-10-01 RX ADMIN — SODIUM CHLORIDE: 9 INJECTION, SOLUTION INTRAVENOUS at 09:53

## 2024-10-01 RX ADMIN — SUCRALFATE 1 G: 1 TABLET ORAL at 06:37

## 2024-10-01 RX ADMIN — PANCRELIPASE LIPASE, PANCRELIPASE PROTEASE, PANCRELIPASE AMYLASE 5000 UNITS: 5000; 17000; 24000 CAPSULE, DELAYED RELEASE ORAL at 08:23

## 2024-10-01 RX ADMIN — PANCRELIPASE LIPASE, PANCRELIPASE PROTEASE, PANCRELIPASE AMYLASE 5000 UNITS: 5000; 17000; 24000 CAPSULE, DELAYED RELEASE ORAL at 12:44

## 2024-10-01 RX ADMIN — SODIUM CHLORIDE: 234 INJECTION INTRAMUSCULAR; INTRAVENOUS; SUBCUTANEOUS at 18:37

## 2024-10-01 RX ADMIN — HYDROMORPHONE HYDROCHLORIDE 0.25 MG: 1 INJECTION, SOLUTION INTRAMUSCULAR; INTRAVENOUS; SUBCUTANEOUS at 20:48

## 2024-10-01 RX ADMIN — SUCRALFATE 1 G: 1 TABLET ORAL at 20:42

## 2024-10-01 RX ADMIN — HYDROMORPHONE HYDROCHLORIDE 0.25 MG: 1 INJECTION, SOLUTION INTRAMUSCULAR; INTRAVENOUS; SUBCUTANEOUS at 14:51

## 2024-10-01 RX ADMIN — PANTOPRAZOLE SODIUM 40 MG: 40 TABLET, DELAYED RELEASE ORAL at 06:37

## 2024-10-01 RX ADMIN — SUCRALFATE 1 G: 1 TABLET ORAL at 17:48

## 2024-10-01 RX ADMIN — SODIUM CHLORIDE: 9 INJECTION, SOLUTION INTRAVENOUS at 18:22

## 2024-10-01 RX ADMIN — PROMETHAZINE HYDROCHLORIDE 25 MG: 25 TABLET ORAL at 11:18

## 2024-10-01 RX ADMIN — VALACYCLOVIR HYDROCHLORIDE 1000 MG: 500 TABLET, FILM COATED ORAL at 08:24

## 2024-10-01 RX ADMIN — SUCRALFATE 1 G: 1 TABLET ORAL at 11:17

## 2024-10-01 RX ADMIN — PANCRELIPASE LIPASE, PANCRELIPASE PROTEASE, PANCRELIPASE AMYLASE 5000 UNITS: 5000; 17000; 24000 CAPSULE, DELAYED RELEASE ORAL at 17:48

## 2024-10-01 RX ADMIN — SODIUM CHLORIDE 1000 ML: 9 INJECTION, SOLUTION INTRAVENOUS at 06:37

## 2024-10-01 ASSESSMENT — PAIN DESCRIPTION - LOCATION
LOCATION: ABDOMEN

## 2024-10-01 ASSESSMENT — PAIN DESCRIPTION - DESCRIPTORS
DESCRIPTORS: ACHING;DISCOMFORT
DESCRIPTORS: SHARP
DESCRIPTORS: SHARP;STABBING
DESCRIPTORS: ACHING;DISCOMFORT

## 2024-10-01 ASSESSMENT — PAIN DESCRIPTION - FREQUENCY: FREQUENCY: INTERMITTENT

## 2024-10-01 ASSESSMENT — PAIN DESCRIPTION - ORIENTATION
ORIENTATION: LEFT

## 2024-10-01 ASSESSMENT — PAIN DESCRIPTION - PAIN TYPE: TYPE: ACUTE PAIN

## 2024-10-01 ASSESSMENT — PAIN - FUNCTIONAL ASSESSMENT
PAIN_FUNCTIONAL_ASSESSMENT: ACTIVITIES ARE NOT PREVENTED
PAIN_FUNCTIONAL_ASSESSMENT: ACTIVITIES ARE NOT PREVENTED

## 2024-10-01 ASSESSMENT — PAIN SCALES - GENERAL
PAINLEVEL_OUTOF10: 7
PAINLEVEL_OUTOF10: 8
PAINLEVEL_OUTOF10: 6
PAINLEVEL_OUTOF10: 6

## 2024-10-01 ASSESSMENT — PAIN DESCRIPTION - ONSET: ONSET: ON-GOING

## 2024-10-01 NOTE — CONSULTS
[address] [address]                              CONSULTATION      PATIENT NAME: ELAINE PAREDES              : 1985  MED REC NO: [id number]                       ROOM: [facility]  ACCOUNT NO: [id number]                       ADMIT DATE: 2024  PROVIDER: Jorge Dahl MD    GI CONSULT    CONSULT DATE: 2024      HISTORY OF PRESENT ILLNESS:  The patient is known to the practice, discharged recently from [facility] by transferred to [facility], where she signed out AMA. She presented again with pain weakness and failure to thrive with significant weight loss with postprandial abdominal discomfort not tolerating oral diet now she has significant weakness . She called the office.  She has not been tolerating food.  She has a PICC line.  She was started on TPN which was admitted to [facility] before as she was losing weight significantly, will be started the TPN. She continued to have abdominal discomfort, diffuse, as well as poor appetite with nausea and vomiting.  She has early satiety.  She feels full fast. After a few bites she cannot eat any more.  All symptoms seem to have been going on for a while, however, became more severe after recent colonoscopy done as an outpatient.  She has no dysphagia or odynophagia.  She has early satiety.  She has abdominal discomfort, usually diffuse.  Sometime in the right upper quadrant and other area also.  Not able to tolerate diet.  She had clear liquid diet, she cannot eat, when she eats something solid she had severe pain.  She takes pain medications, but not a lot. She is therefore started on TPN.  She is also on PPI.  She is not on antibiotics at this time.  She had extensive workup done at [facility] before she was transferred to [facility] . The patient was seen in the past by [facility].  She has extensive workup centimeter

## 2024-10-02 LAB
ANION GAP SERPL CALC-SCNC: 10 MEQ/L (ref 8–16)
BUN SERPL-MCNC: 9 MG/DL (ref 7–22)
CA-I BLD ISE-SCNC: 1.16 MMOL/L (ref 1.12–1.32)
CALCIUM SERPL-MCNC: 7.7 MG/DL (ref 8.5–10.5)
CHLORIDE SERPL-SCNC: 111 MEQ/L (ref 98–111)
CO2 SERPL-SCNC: 21 MEQ/L (ref 23–33)
CREAT SERPL-MCNC: 0.5 MG/DL (ref 0.4–1.2)
GFR SERPL CREATININE-BSD FRML MDRD: > 90 ML/MIN/1.73M2
GLUCOSE BLD STRIP.AUTO-MCNC: 107 MG/DL (ref 70–108)
GLUCOSE BLD STRIP.AUTO-MCNC: 109 MG/DL (ref 70–108)
GLUCOSE BLD STRIP.AUTO-MCNC: 93 MG/DL (ref 70–108)
GLUCOSE BLD STRIP.AUTO-MCNC: 98 MG/DL (ref 70–108)
GLUCOSE SERPL-MCNC: 94 MG/DL (ref 70–108)
MAGNESIUM SERPL-MCNC: 1.7 MG/DL (ref 1.6–2.4)
PHOSPHATE SERPL-MCNC: 2.9 MG/DL (ref 2.4–4.7)
POTASSIUM SERPL-SCNC: 4.4 MEQ/L (ref 3.5–5.2)
SODIUM SERPL-SCNC: 142 MEQ/L (ref 135–145)

## 2024-10-02 PROCEDURE — 6370000000 HC RX 637 (ALT 250 FOR IP): Performed by: PHYSICIAN ASSISTANT

## 2024-10-02 PROCEDURE — 6360000002 HC RX W HCPCS: Performed by: PHYSICIAN ASSISTANT

## 2024-10-02 PROCEDURE — 80048 BASIC METABOLIC PNL TOTAL CA: CPT

## 2024-10-02 PROCEDURE — 99232 SBSQ HOSP IP/OBS MODERATE 35: CPT | Performed by: PHYSICIAN ASSISTANT

## 2024-10-02 PROCEDURE — 6370000000 HC RX 637 (ALT 250 FOR IP): Performed by: INTERNAL MEDICINE

## 2024-10-02 PROCEDURE — 1200000000 HC SEMI PRIVATE

## 2024-10-02 PROCEDURE — 84100 ASSAY OF PHOSPHORUS: CPT

## 2024-10-02 PROCEDURE — 82330 ASSAY OF CALCIUM: CPT

## 2024-10-02 PROCEDURE — 36415 COLL VENOUS BLD VENIPUNCTURE: CPT

## 2024-10-02 PROCEDURE — 83735 ASSAY OF MAGNESIUM: CPT

## 2024-10-02 PROCEDURE — 2580000003 HC RX 258: Performed by: PHYSICIAN ASSISTANT

## 2024-10-02 PROCEDURE — 82948 REAGENT STRIP/BLOOD GLUCOSE: CPT

## 2024-10-02 PROCEDURE — 2500000003 HC RX 250 WO HCPCS: Performed by: PHYSICIAN ASSISTANT

## 2024-10-02 RX ORDER — OXYCODONE HYDROCHLORIDE 5 MG/1
5 TABLET ORAL EVERY 4 HOURS PRN
Status: DISCONTINUED | OUTPATIENT
Start: 2024-10-02 | End: 2024-10-08

## 2024-10-02 RX ORDER — POLYETHYLENE GLYCOL 3350 17 G/17G
17 POWDER, FOR SOLUTION ORAL DAILY
Status: DISCONTINUED | OUTPATIENT
Start: 2024-10-02 | End: 2024-10-09 | Stop reason: HOSPADM

## 2024-10-02 RX ORDER — OXYCODONE HYDROCHLORIDE 5 MG/1
10 TABLET ORAL EVERY 4 HOURS PRN
Status: DISCONTINUED | OUTPATIENT
Start: 2024-10-02 | End: 2024-10-08

## 2024-10-02 RX ADMIN — CLOPIDOGREL BISULFATE 75 MG: 75 TABLET ORAL at 08:31

## 2024-10-02 RX ADMIN — SUCRALFATE 1 G: 1 TABLET ORAL at 10:44

## 2024-10-02 RX ADMIN — POLYETHYLENE GLYCOL (3350) 17 G: 17 POWDER, FOR SOLUTION ORAL at 16:26

## 2024-10-02 RX ADMIN — SUCRALFATE 1 G: 1 TABLET ORAL at 06:25

## 2024-10-02 RX ADMIN — HYDROMORPHONE HYDROCHLORIDE 0.25 MG: 1 INJECTION, SOLUTION INTRAMUSCULAR; INTRAVENOUS; SUBCUTANEOUS at 19:54

## 2024-10-02 RX ADMIN — ENOXAPARIN SODIUM 30 MG: 100 INJECTION SUBCUTANEOUS at 19:54

## 2024-10-02 RX ADMIN — HYDROMORPHONE HYDROCHLORIDE 0.25 MG: 1 INJECTION, SOLUTION INTRAMUSCULAR; INTRAVENOUS; SUBCUTANEOUS at 16:26

## 2024-10-02 RX ADMIN — PANCRELIPASE LIPASE, PANCRELIPASE PROTEASE, PANCRELIPASE AMYLASE 5000 UNITS: 5000; 17000; 24000 CAPSULE, DELAYED RELEASE ORAL at 08:31

## 2024-10-02 RX ADMIN — VALACYCLOVIR HYDROCHLORIDE 1000 MG: 500 TABLET, FILM COATED ORAL at 08:31

## 2024-10-02 RX ADMIN — HYDROMORPHONE HYDROCHLORIDE 0.25 MG: 1 INJECTION, SOLUTION INTRAMUSCULAR; INTRAVENOUS; SUBCUTANEOUS at 09:45

## 2024-10-02 RX ADMIN — SUCRALFATE 1 G: 1 TABLET ORAL at 16:27

## 2024-10-02 RX ADMIN — ACETAMINOPHEN 650 MG: 325 TABLET ORAL at 08:30

## 2024-10-02 RX ADMIN — PANTOPRAZOLE SODIUM 40 MG: 40 TABLET, DELAYED RELEASE ORAL at 06:25

## 2024-10-02 RX ADMIN — HYDROMORPHONE HYDROCHLORIDE 0.25 MG: 1 INJECTION, SOLUTION INTRAMUSCULAR; INTRAVENOUS; SUBCUTANEOUS at 12:44

## 2024-10-02 RX ADMIN — SODIUM CHLORIDE: 9 INJECTION, SOLUTION INTRAVENOUS at 10:26

## 2024-10-02 RX ADMIN — HYDROMORPHONE HYDROCHLORIDE 0.25 MG: 1 INJECTION, SOLUTION INTRAMUSCULAR; INTRAVENOUS; SUBCUTANEOUS at 06:38

## 2024-10-02 RX ADMIN — SUCRALFATE 1 G: 1 TABLET ORAL at 19:54

## 2024-10-02 RX ADMIN — SODIUM CHLORIDE: 9 INJECTION, SOLUTION INTRAVENOUS at 18:45

## 2024-10-02 RX ADMIN — CALCIUM GLUCONATE: 98 INJECTION, SOLUTION INTRAVENOUS at 17:32

## 2024-10-02 RX ADMIN — PROMETHAZINE HYDROCHLORIDE 25 MG: 25 TABLET ORAL at 09:48

## 2024-10-02 RX ADMIN — PANCRELIPASE LIPASE, PANCRELIPASE PROTEASE, PANCRELIPASE AMYLASE 5000 UNITS: 5000; 17000; 24000 CAPSULE, DELAYED RELEASE ORAL at 11:28

## 2024-10-02 RX ADMIN — SODIUM CHLORIDE: 9 INJECTION, SOLUTION INTRAVENOUS at 02:31

## 2024-10-02 RX ADMIN — ONDANSETRON 4 MG: 2 INJECTION INTRAMUSCULAR; INTRAVENOUS at 13:28

## 2024-10-02 RX ADMIN — BUPROPION HYDROCHLORIDE 300 MG: 300 TABLET, EXTENDED RELEASE ORAL at 08:31

## 2024-10-02 RX ADMIN — ATORVASTATIN CALCIUM 40 MG: 40 TABLET, FILM COATED ORAL at 19:54

## 2024-10-02 RX ADMIN — PANCRELIPASE LIPASE, PANCRELIPASE PROTEASE, PANCRELIPASE AMYLASE 5000 UNITS: 5000; 17000; 24000 CAPSULE, DELAYED RELEASE ORAL at 16:27

## 2024-10-02 ASSESSMENT — PAIN DESCRIPTION - ONSET: ONSET: ON-GOING

## 2024-10-02 ASSESSMENT — PAIN SCALES - GENERAL
PAINLEVEL_OUTOF10: 6
PAINLEVEL_OUTOF10: 7
PAINLEVEL_OUTOF10: 7

## 2024-10-02 ASSESSMENT — PAIN DESCRIPTION - LOCATION
LOCATION: ABDOMEN

## 2024-10-02 ASSESSMENT — PAIN - FUNCTIONAL ASSESSMENT
PAIN_FUNCTIONAL_ASSESSMENT: ACTIVITIES ARE NOT PREVENTED

## 2024-10-02 ASSESSMENT — PAIN DESCRIPTION - ORIENTATION
ORIENTATION: LEFT

## 2024-10-02 ASSESSMENT — PAIN DESCRIPTION - DESCRIPTORS
DESCRIPTORS: ACHING;DISCOMFORT
DESCRIPTORS: SHARP;STABBING
DESCRIPTORS: ACHING;STABBING
DESCRIPTORS: STABBING
DESCRIPTORS: ACHING;DISCOMFORT
DESCRIPTORS: SHARP;STABBING

## 2024-10-02 ASSESSMENT — PAIN DESCRIPTION - PAIN TYPE: TYPE: ACUTE PAIN

## 2024-10-02 ASSESSMENT — PAIN DESCRIPTION - FREQUENCY: FREQUENCY: INTERMITTENT

## 2024-10-03 LAB
ANION GAP SERPL CALC-SCNC: 8 MEQ/L (ref 8–16)
BUN SERPL-MCNC: 10 MG/DL (ref 7–22)
CA-I BLD ISE-SCNC: 1.26 MMOL/L (ref 1.12–1.32)
CALCIUM SERPL-MCNC: 7.8 MG/DL (ref 8.5–10.5)
CHLORIDE SERPL-SCNC: 110 MEQ/L (ref 98–111)
CO2 SERPL-SCNC: 23 MEQ/L (ref 23–33)
CREAT SERPL-MCNC: 0.5 MG/DL (ref 0.4–1.2)
GFR SERPL CREATININE-BSD FRML MDRD: > 90 ML/MIN/1.73M2
GLUCOSE BLD STRIP.AUTO-MCNC: 109 MG/DL (ref 70–108)
GLUCOSE BLD STRIP.AUTO-MCNC: 109 MG/DL (ref 70–108)
GLUCOSE BLD STRIP.AUTO-MCNC: 96 MG/DL (ref 70–108)
GLUCOSE BLD STRIP.AUTO-MCNC: 97 MG/DL (ref 70–108)
GLUCOSE SERPL-MCNC: 100 MG/DL (ref 70–108)
MAGNESIUM SERPL-MCNC: 1.7 MG/DL (ref 1.6–2.4)
PHOSPHATE SERPL-MCNC: 2.8 MG/DL (ref 2.4–4.7)
POTASSIUM SERPL-SCNC: 4.5 MEQ/L (ref 3.5–5.2)
SODIUM SERPL-SCNC: 141 MEQ/L (ref 135–145)

## 2024-10-03 PROCEDURE — 80048 BASIC METABOLIC PNL TOTAL CA: CPT

## 2024-10-03 PROCEDURE — 6360000002 HC RX W HCPCS: Performed by: PHYSICIAN ASSISTANT

## 2024-10-03 PROCEDURE — 82948 REAGENT STRIP/BLOOD GLUCOSE: CPT

## 2024-10-03 PROCEDURE — 6370000000 HC RX 637 (ALT 250 FOR IP): Performed by: INTERNAL MEDICINE

## 2024-10-03 PROCEDURE — 84100 ASSAY OF PHOSPHORUS: CPT

## 2024-10-03 PROCEDURE — 2500000003 HC RX 250 WO HCPCS: Performed by: PHYSICIAN ASSISTANT

## 2024-10-03 PROCEDURE — 2580000003 HC RX 258: Performed by: PHYSICIAN ASSISTANT

## 2024-10-03 PROCEDURE — 82330 ASSAY OF CALCIUM: CPT

## 2024-10-03 PROCEDURE — 36415 COLL VENOUS BLD VENIPUNCTURE: CPT

## 2024-10-03 PROCEDURE — 6370000000 HC RX 637 (ALT 250 FOR IP): Performed by: PHYSICIAN ASSISTANT

## 2024-10-03 PROCEDURE — 83735 ASSAY OF MAGNESIUM: CPT

## 2024-10-03 PROCEDURE — 99232 SBSQ HOSP IP/OBS MODERATE 35: CPT | Performed by: PHYSICIAN ASSISTANT

## 2024-10-03 PROCEDURE — 1200000000 HC SEMI PRIVATE

## 2024-10-03 RX ADMIN — HYDROMORPHONE HYDROCHLORIDE 0.5 MG: 1 INJECTION, SOLUTION INTRAMUSCULAR; INTRAVENOUS; SUBCUTANEOUS at 08:31

## 2024-10-03 RX ADMIN — SUCRALFATE 1 G: 1 TABLET ORAL at 16:47

## 2024-10-03 RX ADMIN — SODIUM CHLORIDE: 234 INJECTION INTRAMUSCULAR; INTRAVENOUS; SUBCUTANEOUS at 17:47

## 2024-10-03 RX ADMIN — PANCRELIPASE LIPASE, PANCRELIPASE PROTEASE, PANCRELIPASE AMYLASE 5000 UNITS: 5000; 17000; 24000 CAPSULE, DELAYED RELEASE ORAL at 16:47

## 2024-10-03 RX ADMIN — HYDROMORPHONE HYDROCHLORIDE 0.25 MG: 1 INJECTION, SOLUTION INTRAMUSCULAR; INTRAVENOUS; SUBCUTANEOUS at 01:47

## 2024-10-03 RX ADMIN — SUCRALFATE 1 G: 1 TABLET ORAL at 06:26

## 2024-10-03 RX ADMIN — ATORVASTATIN CALCIUM 40 MG: 40 TABLET, FILM COATED ORAL at 21:13

## 2024-10-03 RX ADMIN — CLOPIDOGREL BISULFATE 75 MG: 75 TABLET ORAL at 08:33

## 2024-10-03 RX ADMIN — PROMETHAZINE HYDROCHLORIDE 25 MG: 25 TABLET ORAL at 12:47

## 2024-10-03 RX ADMIN — PANCRELIPASE LIPASE, PANCRELIPASE PROTEASE, PANCRELIPASE AMYLASE 5000 UNITS: 5000; 17000; 24000 CAPSULE, DELAYED RELEASE ORAL at 08:30

## 2024-10-03 RX ADMIN — VALACYCLOVIR HYDROCHLORIDE 1000 MG: 500 TABLET, FILM COATED ORAL at 08:30

## 2024-10-03 RX ADMIN — HYDROMORPHONE HYDROCHLORIDE 0.5 MG: 1 INJECTION, SOLUTION INTRAMUSCULAR; INTRAVENOUS; SUBCUTANEOUS at 15:18

## 2024-10-03 RX ADMIN — HYDROMORPHONE HYDROCHLORIDE 0.5 MG: 1 INJECTION, SOLUTION INTRAMUSCULAR; INTRAVENOUS; SUBCUTANEOUS at 11:43

## 2024-10-03 RX ADMIN — SUCRALFATE 1 G: 1 TABLET ORAL at 21:13

## 2024-10-03 RX ADMIN — HYDROMORPHONE HYDROCHLORIDE 0.5 MG: 1 INJECTION, SOLUTION INTRAMUSCULAR; INTRAVENOUS; SUBCUTANEOUS at 21:13

## 2024-10-03 RX ADMIN — ONDANSETRON 4 MG: 2 INJECTION INTRAMUSCULAR; INTRAVENOUS at 21:13

## 2024-10-03 RX ADMIN — PANCRELIPASE LIPASE, PANCRELIPASE PROTEASE, PANCRELIPASE AMYLASE 5000 UNITS: 5000; 17000; 24000 CAPSULE, DELAYED RELEASE ORAL at 11:40

## 2024-10-03 RX ADMIN — ONDANSETRON 4 MG: 4 TABLET, ORALLY DISINTEGRATING ORAL at 09:50

## 2024-10-03 RX ADMIN — SODIUM CHLORIDE: 9 INJECTION, SOLUTION INTRAVENOUS at 11:28

## 2024-10-03 RX ADMIN — ONDANSETRON 4 MG: 2 INJECTION INTRAMUSCULAR; INTRAVENOUS at 01:47

## 2024-10-03 RX ADMIN — SODIUM CHLORIDE: 9 INJECTION, SOLUTION INTRAVENOUS at 03:12

## 2024-10-03 RX ADMIN — POLYETHYLENE GLYCOL (3350) 17 G: 17 POWDER, FOR SOLUTION ORAL at 08:30

## 2024-10-03 RX ADMIN — SUCRALFATE 1 G: 1 TABLET ORAL at 11:40

## 2024-10-03 RX ADMIN — PANTOPRAZOLE SODIUM 40 MG: 40 TABLET, DELAYED RELEASE ORAL at 06:26

## 2024-10-03 ASSESSMENT — PAIN DESCRIPTION - ORIENTATION
ORIENTATION: LEFT

## 2024-10-03 ASSESSMENT — PAIN DESCRIPTION - DESCRIPTORS
DESCRIPTORS: SHARP;STABBING
DESCRIPTORS: STABBING
DESCRIPTORS: SHARP;STABBING
DESCRIPTORS: STABBING;SHARP
DESCRIPTORS: ACHING

## 2024-10-03 ASSESSMENT — PAIN - FUNCTIONAL ASSESSMENT
PAIN_FUNCTIONAL_ASSESSMENT: ACTIVITIES ARE NOT PREVENTED

## 2024-10-03 ASSESSMENT — PAIN SCALES - GENERAL
PAINLEVEL_OUTOF10: 8
PAINLEVEL_OUTOF10: 9
PAINLEVEL_OUTOF10: 8
PAINLEVEL_OUTOF10: 6
PAINLEVEL_OUTOF10: 8
PAINLEVEL_OUTOF10: 7
PAINLEVEL_OUTOF10: 5

## 2024-10-03 ASSESSMENT — PAIN DESCRIPTION - LOCATION
LOCATION: ABDOMEN

## 2024-10-03 ASSESSMENT — PAIN DESCRIPTION - PAIN TYPE: TYPE: ACUTE PAIN

## 2024-10-03 ASSESSMENT — PAIN SCALES - WONG BAKER: WONGBAKER_NUMERICALRESPONSE: NO HURT

## 2024-10-03 NOTE — CARE COORDINATION
10/3/24, 9:14 AM EDT    DISCHARGE ON GOING EVALUATION    Ana [facility]STIAN[facility] day: 2  Location: -03/003-A Reason for admit: Generalized abdominal pain [R10.84]  Moderate malnutrition (HCC) [E44.0]  Malnutrition, unspecified type (HCC) [E46]  Intractable abdominal pain [R10.9]         Barriers to Discharge: Hospitalist and GI following, Dietician, TPN per pharmacy dosing, Lovenox, ISS, prn Tylenol, Dilaudid, Roxicodone, Phenergan and Zofran, daily labs, clear liquid gluten free diet, SCD's, up with assistance.     PCP: Usha Amaya MD  Readmission Risk Score: 18.7    Patient Goals/Plan/Treatment Preferences: Ana is from home with her . She does not have medical necessity per GI documentation for TPN at home per [facility]. GI suggest dobhoff and enteral feedings. Patient also requesting IV Dilaudid OTC. Following.

## 2024-10-03 NOTE — SIGNIFICANT EVENT
Sonja Ryan, Registered Dietician at [facility], called the office inquiring about pt's TPN needs, stating that the patient called her this morning stating \"no one is addressing my TPN.\"  I explained to Sonja that the patient was currently inpatient at [facility], receiving TPN per [facility] Dietician/Pharmacist and that Dr Dahl would be renewing all of her future TPN orders based the [facility] Dietician/Pharmacist recommendations.  I offered to fax the progress notes to her for review which she declined.      Flavia Chaney, APRN - CNP

## 2024-10-04 ENCOUNTER — APPOINTMENT (OUTPATIENT)
Dept: GENERAL RADIOLOGY | Age: 39
DRG: 641 | End: 2024-10-04
Payer: COMMERCIAL

## 2024-10-04 LAB
ANION GAP SERPL CALC-SCNC: 7 MEQ/L (ref 8–16)
BUN SERPL-MCNC: 10 MG/DL (ref 7–22)
CA-I BLD ISE-SCNC: 1.21 MMOL/L (ref 1.12–1.32)
CALCIUM SERPL-MCNC: 8 MG/DL (ref 8.5–10.5)
CHLORIDE SERPL-SCNC: 107 MEQ/L (ref 98–111)
CO2 SERPL-SCNC: 24 MEQ/L (ref 23–33)
CREAT SERPL-MCNC: 0.5 MG/DL (ref 0.4–1.2)
GFR SERPL CREATININE-BSD FRML MDRD: > 90 ML/MIN/1.73M2
GLUCOSE BLD STRIP.AUTO-MCNC: 106 MG/DL (ref 70–108)
GLUCOSE BLD STRIP.AUTO-MCNC: 83 MG/DL (ref 70–108)
GLUCOSE BLD STRIP.AUTO-MCNC: 93 MG/DL (ref 70–108)
GLUCOSE BLD STRIP.AUTO-MCNC: 96 MG/DL (ref 70–108)
GLUCOSE SERPL-MCNC: 84 MG/DL (ref 70–108)
MAGNESIUM SERPL-MCNC: 1.6 MG/DL (ref 1.6–2.4)
PHOSPHATE SERPL-MCNC: 3.8 MG/DL (ref 2.4–4.7)
POTASSIUM SERPL-SCNC: 4 MEQ/L (ref 3.5–5.2)
SODIUM SERPL-SCNC: 138 MEQ/L (ref 135–145)

## 2024-10-04 PROCEDURE — 1200000000 HC SEMI PRIVATE

## 2024-10-04 PROCEDURE — 84100 ASSAY OF PHOSPHORUS: CPT

## 2024-10-04 PROCEDURE — 2500000003 HC RX 250 WO HCPCS: Performed by: PHYSICIAN ASSISTANT

## 2024-10-04 PROCEDURE — 82330 ASSAY OF CALCIUM: CPT

## 2024-10-04 PROCEDURE — 99232 SBSQ HOSP IP/OBS MODERATE 35: CPT | Performed by: PHYSICIAN ASSISTANT

## 2024-10-04 PROCEDURE — 6360000004 HC RX CONTRAST MEDICATION: Performed by: NURSE PRACTITIONER

## 2024-10-04 PROCEDURE — 80048 BASIC METABOLIC PNL TOTAL CA: CPT

## 2024-10-04 PROCEDURE — 6360000002 HC RX W HCPCS: Performed by: PHYSICIAN ASSISTANT

## 2024-10-04 PROCEDURE — 6370000000 HC RX 637 (ALT 250 FOR IP): Performed by: INTERNAL MEDICINE

## 2024-10-04 PROCEDURE — 2580000003 HC RX 258: Performed by: PHYSICIAN ASSISTANT

## 2024-10-04 PROCEDURE — 6370000000 HC RX 637 (ALT 250 FOR IP): Performed by: PHYSICIAN ASSISTANT

## 2024-10-04 PROCEDURE — 0DH67UZ INSERTION OF FEEDING DEVICE INTO STOMACH, VIA NATURAL OR ARTIFICIAL OPENING: ICD-10-PCS | Performed by: RADIOLOGY

## 2024-10-04 PROCEDURE — 36415 COLL VENOUS BLD VENIPUNCTURE: CPT

## 2024-10-04 PROCEDURE — 82948 REAGENT STRIP/BLOOD GLUCOSE: CPT

## 2024-10-04 PROCEDURE — 44500 INTRO GASTROINTESTINAL TUBE: CPT

## 2024-10-04 PROCEDURE — 6370000000 HC RX 637 (ALT 250 FOR IP): Performed by: NURSE PRACTITIONER

## 2024-10-04 PROCEDURE — 83735 ASSAY OF MAGNESIUM: CPT

## 2024-10-04 PROCEDURE — 43752 NASAL/OROGASTRIC W/TUBE PLMT: CPT

## 2024-10-04 RX ORDER — DIATRIZOATE MEGLUMINE AND DIATRIZOATE SODIUM 660; 100 MG/ML; MG/ML
30 SOLUTION ORAL; RECTAL
Status: DISCONTINUED | OUTPATIENT
Start: 2024-10-04 | End: 2024-10-09 | Stop reason: HOSPADM

## 2024-10-04 RX ORDER — DOCUSATE SODIUM 100 MG/1
100 CAPSULE, LIQUID FILLED ORAL 2 TIMES DAILY
Status: DISCONTINUED | OUTPATIENT
Start: 2024-10-04 | End: 2024-10-09 | Stop reason: HOSPADM

## 2024-10-04 RX ORDER — ENOXAPARIN SODIUM 100 MG/ML
40 INJECTION SUBCUTANEOUS EVERY 24 HOURS
Status: DISCONTINUED | OUTPATIENT
Start: 2024-10-04 | End: 2024-10-08 | Stop reason: SDUPTHER

## 2024-10-04 RX ADMIN — DIATRIZOATE MEGLUMINE AND DIATRIZOATE SODIUM 30 ML: 660; 100 LIQUID ORAL; RECTAL at 11:45

## 2024-10-04 RX ADMIN — SUCRALFATE 1 G: 1 TABLET ORAL at 18:05

## 2024-10-04 RX ADMIN — CLOPIDOGREL BISULFATE 75 MG: 75 TABLET ORAL at 08:43

## 2024-10-04 RX ADMIN — VALACYCLOVIR HYDROCHLORIDE 1000 MG: 500 TABLET, FILM COATED ORAL at 08:44

## 2024-10-04 RX ADMIN — HYDROMORPHONE HYDROCHLORIDE 0.5 MG: 1 INJECTION, SOLUTION INTRAMUSCULAR; INTRAVENOUS; SUBCUTANEOUS at 08:42

## 2024-10-04 RX ADMIN — ENOXAPARIN SODIUM 40 MG: 100 INJECTION SUBCUTANEOUS at 21:17

## 2024-10-04 RX ADMIN — PANCRELIPASE LIPASE, PANCRELIPASE PROTEASE, PANCRELIPASE AMYLASE 5000 UNITS: 5000; 17000; 24000 CAPSULE, DELAYED RELEASE ORAL at 18:05

## 2024-10-04 RX ADMIN — SODIUM CHLORIDE: 234 INJECTION INTRAMUSCULAR; INTRAVENOUS; SUBCUTANEOUS at 18:19

## 2024-10-04 RX ADMIN — PANCRELIPASE LIPASE, PANCRELIPASE PROTEASE, PANCRELIPASE AMYLASE 5000 UNITS: 5000; 17000; 24000 CAPSULE, DELAYED RELEASE ORAL at 08:44

## 2024-10-04 RX ADMIN — PANCRELIPASE LIPASE, PANCRELIPASE PROTEASE, PANCRELIPASE AMYLASE 5000 UNITS: 5000; 17000; 24000 CAPSULE, DELAYED RELEASE ORAL at 12:27

## 2024-10-04 RX ADMIN — SUCRALFATE 1 G: 1 TABLET ORAL at 12:27

## 2024-10-04 RX ADMIN — PANTOPRAZOLE SODIUM 40 MG: 40 TABLET, DELAYED RELEASE ORAL at 05:13

## 2024-10-04 RX ADMIN — SUCRALFATE 1 G: 1 TABLET ORAL at 05:13

## 2024-10-04 RX ADMIN — HYDROMORPHONE HYDROCHLORIDE 0.5 MG: 1 INJECTION, SOLUTION INTRAMUSCULAR; INTRAVENOUS; SUBCUTANEOUS at 18:04

## 2024-10-04 RX ADMIN — HYDROMORPHONE HYDROCHLORIDE 0.5 MG: 1 INJECTION, SOLUTION INTRAMUSCULAR; INTRAVENOUS; SUBCUTANEOUS at 00:48

## 2024-10-04 RX ADMIN — POLYETHYLENE GLYCOL (3350) 17 G: 17 POWDER, FOR SOLUTION ORAL at 08:27

## 2024-10-04 RX ADMIN — ATORVASTATIN CALCIUM 40 MG: 40 TABLET, FILM COATED ORAL at 21:13

## 2024-10-04 RX ADMIN — PROMETHAZINE HYDROCHLORIDE 25 MG: 25 TABLET ORAL at 21:13

## 2024-10-04 RX ADMIN — HYDROMORPHONE HYDROCHLORIDE 0.5 MG: 1 INJECTION, SOLUTION INTRAMUSCULAR; INTRAVENOUS; SUBCUTANEOUS at 21:10

## 2024-10-04 RX ADMIN — HYDROMORPHONE HYDROCHLORIDE 0.5 MG: 1 INJECTION, SOLUTION INTRAMUSCULAR; INTRAVENOUS; SUBCUTANEOUS at 14:55

## 2024-10-04 RX ADMIN — PROMETHAZINE HYDROCHLORIDE 25 MG: 25 TABLET ORAL at 14:52

## 2024-10-04 RX ADMIN — HYDROMORPHONE HYDROCHLORIDE 0.5 MG: 1 INJECTION, SOLUTION INTRAMUSCULAR; INTRAVENOUS; SUBCUTANEOUS at 12:15

## 2024-10-04 RX ADMIN — ONDANSETRON 4 MG: 2 INJECTION INTRAMUSCULAR; INTRAVENOUS at 18:04

## 2024-10-04 RX ADMIN — SUCRALFATE 1 G: 1 TABLET ORAL at 21:12

## 2024-10-04 RX ADMIN — BUPROPION HYDROCHLORIDE 300 MG: 300 TABLET, EXTENDED RELEASE ORAL at 08:44

## 2024-10-04 RX ADMIN — HYDROMORPHONE HYDROCHLORIDE 0.5 MG: 1 INJECTION, SOLUTION INTRAMUSCULAR; INTRAVENOUS; SUBCUTANEOUS at 05:13

## 2024-10-04 RX ADMIN — DOCUSATE SODIUM 100 MG: 100 CAPSULE, LIQUID FILLED ORAL at 12:27

## 2024-10-04 RX ADMIN — ONDANSETRON 4 MG: 2 INJECTION INTRAMUSCULAR; INTRAVENOUS at 08:43

## 2024-10-04 RX ADMIN — MAGNESIUM SULFATE HEPTAHYDRATE 2000 MG: 40 INJECTION, SOLUTION INTRAVENOUS at 06:22

## 2024-10-04 ASSESSMENT — PAIN SCALES - GENERAL
PAINLEVEL_OUTOF10: 6
PAINLEVEL_OUTOF10: 7
PAINLEVEL_OUTOF10: 7
PAINLEVEL_OUTOF10: 6
PAINLEVEL_OUTOF10: 7

## 2024-10-04 ASSESSMENT — PAIN DESCRIPTION - ORIENTATION
ORIENTATION: RIGHT;LEFT;MID;LOWER;UPPER
ORIENTATION: LEFT
ORIENTATION: LEFT

## 2024-10-04 ASSESSMENT — PAIN DESCRIPTION - DESCRIPTORS
DESCRIPTORS: ACHING
DESCRIPTORS: ACHING
DESCRIPTORS: BURNING
DESCRIPTORS: ACHING

## 2024-10-04 ASSESSMENT — PAIN - FUNCTIONAL ASSESSMENT
PAIN_FUNCTIONAL_ASSESSMENT: ACTIVITIES ARE NOT PREVENTED

## 2024-10-04 ASSESSMENT — PAIN DESCRIPTION - LOCATION
LOCATION: ABDOMEN

## 2024-10-05 LAB
ANION GAP SERPL CALC-SCNC: 8 MEQ/L (ref 8–16)
BUN SERPL-MCNC: 13 MG/DL (ref 7–22)
CA-I BLD ISE-SCNC: 1.23 MMOL/L (ref 1.12–1.32)
CALCIUM SERPL-MCNC: 8.3 MG/DL (ref 8.5–10.5)
CHLORIDE SERPL-SCNC: 105 MEQ/L (ref 98–111)
CO2 SERPL-SCNC: 27 MEQ/L (ref 23–33)
CREAT SERPL-MCNC: 0.5 MG/DL (ref 0.4–1.2)
GFR SERPL CREATININE-BSD FRML MDRD: > 90 ML/MIN/1.73M2
GLUCOSE BLD STRIP.AUTO-MCNC: 105 MG/DL (ref 70–108)
GLUCOSE BLD STRIP.AUTO-MCNC: 91 MG/DL (ref 70–108)
GLUCOSE BLD STRIP.AUTO-MCNC: 94 MG/DL (ref 70–108)
GLUCOSE BLD STRIP.AUTO-MCNC: 95 MG/DL (ref 70–108)
GLUCOSE SERPL-MCNC: 102 MG/DL (ref 70–108)
MAGNESIUM SERPL-MCNC: 2 MG/DL (ref 1.6–2.4)
PHOSPHATE SERPL-MCNC: 4.2 MG/DL (ref 2.4–4.7)
POTASSIUM SERPL-SCNC: 4.8 MEQ/L (ref 3.5–5.2)
SODIUM SERPL-SCNC: 140 MEQ/L (ref 135–145)

## 2024-10-05 PROCEDURE — 99232 SBSQ HOSP IP/OBS MODERATE 35: CPT | Performed by: INTERNAL MEDICINE

## 2024-10-05 PROCEDURE — 2580000003 HC RX 258: Performed by: PHYSICIAN ASSISTANT

## 2024-10-05 PROCEDURE — 6360000002 HC RX W HCPCS: Performed by: PHYSICIAN ASSISTANT

## 2024-10-05 PROCEDURE — 2580000003 HC RX 258: Performed by: PHARMACIST

## 2024-10-05 PROCEDURE — 84100 ASSAY OF PHOSPHORUS: CPT

## 2024-10-05 PROCEDURE — 6370000000 HC RX 637 (ALT 250 FOR IP): Performed by: PHYSICIAN ASSISTANT

## 2024-10-05 PROCEDURE — 82948 REAGENT STRIP/BLOOD GLUCOSE: CPT

## 2024-10-05 PROCEDURE — 83735 ASSAY OF MAGNESIUM: CPT

## 2024-10-05 PROCEDURE — 82330 ASSAY OF CALCIUM: CPT

## 2024-10-05 PROCEDURE — 36415 COLL VENOUS BLD VENIPUNCTURE: CPT

## 2024-10-05 PROCEDURE — 1200000000 HC SEMI PRIVATE

## 2024-10-05 PROCEDURE — 6360000002 HC RX W HCPCS: Performed by: PHARMACIST

## 2024-10-05 PROCEDURE — 2500000003 HC RX 250 WO HCPCS: Performed by: PHARMACIST

## 2024-10-05 PROCEDURE — 36592 COLLECT BLOOD FROM PICC: CPT

## 2024-10-05 PROCEDURE — 80048 BASIC METABOLIC PNL TOTAL CA: CPT

## 2024-10-05 RX ADMIN — PANCRELIPASE LIPASE, PANCRELIPASE PROTEASE, PANCRELIPASE AMYLASE 5000 UNITS: 5000; 17000; 24000 CAPSULE, DELAYED RELEASE ORAL at 08:26

## 2024-10-05 RX ADMIN — CLOPIDOGREL BISULFATE 75 MG: 75 TABLET ORAL at 08:26

## 2024-10-05 RX ADMIN — PROMETHAZINE HYDROCHLORIDE 25 MG: 25 TABLET ORAL at 22:45

## 2024-10-05 RX ADMIN — SODIUM CHLORIDE: 9 INJECTION, SOLUTION INTRAVENOUS at 06:35

## 2024-10-05 RX ADMIN — PANTOPRAZOLE SODIUM 40 MG: 40 TABLET, DELAYED RELEASE ORAL at 06:55

## 2024-10-05 RX ADMIN — SUCRALFATE 1 G: 1 TABLET ORAL at 12:32

## 2024-10-05 RX ADMIN — ONDANSETRON 4 MG: 2 INJECTION INTRAMUSCULAR; INTRAVENOUS at 12:35

## 2024-10-05 RX ADMIN — PROMETHAZINE HYDROCHLORIDE 25 MG: 25 TABLET ORAL at 16:11

## 2024-10-05 RX ADMIN — SUCRALFATE 1 G: 1 TABLET ORAL at 18:40

## 2024-10-05 RX ADMIN — HYDROMORPHONE HYDROCHLORIDE 0.5 MG: 1 INJECTION, SOLUTION INTRAMUSCULAR; INTRAVENOUS; SUBCUTANEOUS at 22:45

## 2024-10-05 RX ADMIN — SUCRALFATE 1 G: 1 TABLET ORAL at 22:00

## 2024-10-05 RX ADMIN — ONDANSETRON 4 MG: 2 INJECTION INTRAMUSCULAR; INTRAVENOUS at 06:31

## 2024-10-05 RX ADMIN — ATORVASTATIN CALCIUM 40 MG: 40 TABLET, FILM COATED ORAL at 22:53

## 2024-10-05 RX ADMIN — HYDROMORPHONE HYDROCHLORIDE 0.5 MG: 1 INJECTION, SOLUTION INTRAMUSCULAR; INTRAVENOUS; SUBCUTANEOUS at 03:32

## 2024-10-05 RX ADMIN — SODIUM CHLORIDE: 234 INJECTION INTRAMUSCULAR; INTRAVENOUS; SUBCUTANEOUS at 18:49

## 2024-10-05 RX ADMIN — PROMETHAZINE HYDROCHLORIDE 25 MG: 25 TABLET ORAL at 09:42

## 2024-10-05 RX ADMIN — ENOXAPARIN SODIUM 40 MG: 100 INJECTION SUBCUTANEOUS at 22:00

## 2024-10-05 RX ADMIN — VALACYCLOVIR HYDROCHLORIDE 1000 MG: 500 TABLET, FILM COATED ORAL at 08:26

## 2024-10-05 RX ADMIN — PROMETHAZINE HYDROCHLORIDE 25 MG: 25 TABLET ORAL at 03:32

## 2024-10-05 RX ADMIN — HYDROMORPHONE HYDROCHLORIDE 0.5 MG: 1 INJECTION, SOLUTION INTRAMUSCULAR; INTRAVENOUS; SUBCUTANEOUS at 12:34

## 2024-10-05 RX ADMIN — SODIUM CHLORIDE: 9 INJECTION, SOLUTION INTRAVENOUS at 16:11

## 2024-10-05 RX ADMIN — PANCRELIPASE LIPASE, PANCRELIPASE PROTEASE, PANCRELIPASE AMYLASE 5000 UNITS: 5000; 17000; 24000 CAPSULE, DELAYED RELEASE ORAL at 18:40

## 2024-10-05 RX ADMIN — HYDROMORPHONE HYDROCHLORIDE 0.5 MG: 1 INJECTION, SOLUTION INTRAMUSCULAR; INTRAVENOUS; SUBCUTANEOUS at 19:39

## 2024-10-05 RX ADMIN — HYDROMORPHONE HYDROCHLORIDE 0.5 MG: 1 INJECTION, SOLUTION INTRAMUSCULAR; INTRAVENOUS; SUBCUTANEOUS at 16:11

## 2024-10-05 RX ADMIN — HYDROMORPHONE HYDROCHLORIDE 0.5 MG: 1 INJECTION, SOLUTION INTRAMUSCULAR; INTRAVENOUS; SUBCUTANEOUS at 09:42

## 2024-10-05 RX ADMIN — ONDANSETRON 4 MG: 2 INJECTION INTRAMUSCULAR; INTRAVENOUS at 00:26

## 2024-10-05 RX ADMIN — SUCRALFATE 1 G: 1 TABLET ORAL at 06:55

## 2024-10-05 RX ADMIN — PANCRELIPASE LIPASE, PANCRELIPASE PROTEASE, PANCRELIPASE AMYLASE 5000 UNITS: 5000; 17000; 24000 CAPSULE, DELAYED RELEASE ORAL at 12:31

## 2024-10-05 RX ADMIN — HYDROMORPHONE HYDROCHLORIDE 0.5 MG: 1 INJECTION, SOLUTION INTRAMUSCULAR; INTRAVENOUS; SUBCUTANEOUS at 06:31

## 2024-10-05 RX ADMIN — HYDROMORPHONE HYDROCHLORIDE 0.5 MG: 1 INJECTION, SOLUTION INTRAMUSCULAR; INTRAVENOUS; SUBCUTANEOUS at 00:26

## 2024-10-05 ASSESSMENT — PAIN SCALES - GENERAL
PAINLEVEL_OUTOF10: 9
PAINLEVEL_OUTOF10: 7
PAINLEVEL_OUTOF10: 7
PAINLEVEL_OUTOF10: 8
PAINLEVEL_OUTOF10: 8
PAINLEVEL_OUTOF10: 9
PAINLEVEL_OUTOF10: 7
PAINLEVEL_OUTOF10: 8

## 2024-10-05 ASSESSMENT — PAIN - FUNCTIONAL ASSESSMENT
PAIN_FUNCTIONAL_ASSESSMENT: PREVENTS OR INTERFERES SOME ACTIVE ACTIVITIES AND ADLS

## 2024-10-05 ASSESSMENT — PAIN DESCRIPTION - LOCATION
LOCATION: ABDOMEN

## 2024-10-05 ASSESSMENT — PAIN DESCRIPTION - ORIENTATION
ORIENTATION: MID
ORIENTATION: MID

## 2024-10-05 ASSESSMENT — PAIN DESCRIPTION - DESCRIPTORS
DESCRIPTORS: CRAMPING
DESCRIPTORS: CRAMPING

## 2024-10-06 LAB
ANION GAP SERPL CALC-SCNC: 10 MEQ/L (ref 8–16)
BUN SERPL-MCNC: 12 MG/DL (ref 7–22)
CA-I BLD ISE-SCNC: 1.21 MMOL/L (ref 1.12–1.32)
CALCIUM SERPL-MCNC: 8.8 MG/DL (ref 8.5–10.5)
CHLORIDE SERPL-SCNC: 106 MEQ/L (ref 98–111)
CO2 SERPL-SCNC: 26 MEQ/L (ref 23–33)
CREAT SERPL-MCNC: 0.5 MG/DL (ref 0.4–1.2)
GFR SERPL CREATININE-BSD FRML MDRD: > 90 ML/MIN/1.73M2
GLUCOSE BLD STRIP.AUTO-MCNC: 101 MG/DL (ref 70–108)
GLUCOSE BLD STRIP.AUTO-MCNC: 102 MG/DL (ref 70–108)
GLUCOSE BLD STRIP.AUTO-MCNC: 102 MG/DL (ref 70–108)
GLUCOSE BLD STRIP.AUTO-MCNC: 81 MG/DL (ref 70–108)
GLUCOSE SERPL-MCNC: 86 MG/DL (ref 70–108)
MAGNESIUM SERPL-MCNC: 1.8 MG/DL (ref 1.6–2.4)
PHOSPHATE SERPL-MCNC: 4.4 MG/DL (ref 2.4–4.7)
POTASSIUM SERPL-SCNC: 4.3 MEQ/L (ref 3.5–5.2)
SODIUM SERPL-SCNC: 142 MEQ/L (ref 135–145)

## 2024-10-06 PROCEDURE — 2580000003 HC RX 258: Performed by: PHARMACIST

## 2024-10-06 PROCEDURE — 2500000003 HC RX 250 WO HCPCS: Performed by: PHARMACIST

## 2024-10-06 PROCEDURE — 84100 ASSAY OF PHOSPHORUS: CPT

## 2024-10-06 PROCEDURE — 82330 ASSAY OF CALCIUM: CPT

## 2024-10-06 PROCEDURE — 83735 ASSAY OF MAGNESIUM: CPT

## 2024-10-06 PROCEDURE — 80048 BASIC METABOLIC PNL TOTAL CA: CPT

## 2024-10-06 PROCEDURE — 6370000000 HC RX 637 (ALT 250 FOR IP): Performed by: PHYSICIAN ASSISTANT

## 2024-10-06 PROCEDURE — 99232 SBSQ HOSP IP/OBS MODERATE 35: CPT | Performed by: INTERNAL MEDICINE

## 2024-10-06 PROCEDURE — 36415 COLL VENOUS BLD VENIPUNCTURE: CPT

## 2024-10-06 PROCEDURE — 6360000002 HC RX W HCPCS: Performed by: PHARMACIST

## 2024-10-06 PROCEDURE — 2580000003 HC RX 258: Performed by: PHYSICIAN ASSISTANT

## 2024-10-06 PROCEDURE — 82948 REAGENT STRIP/BLOOD GLUCOSE: CPT

## 2024-10-06 PROCEDURE — 6360000002 HC RX W HCPCS: Performed by: PHYSICIAN ASSISTANT

## 2024-10-06 PROCEDURE — 1200000000 HC SEMI PRIVATE

## 2024-10-06 RX ADMIN — PANTOPRAZOLE SODIUM 40 MG: 40 TABLET, DELAYED RELEASE ORAL at 06:24

## 2024-10-06 RX ADMIN — HYDROMORPHONE HYDROCHLORIDE 0.5 MG: 1 INJECTION, SOLUTION INTRAMUSCULAR; INTRAVENOUS; SUBCUTANEOUS at 18:21

## 2024-10-06 RX ADMIN — BUPROPION HYDROCHLORIDE 300 MG: 300 TABLET, EXTENDED RELEASE ORAL at 09:14

## 2024-10-06 RX ADMIN — SODIUM CHLORIDE: 234 INJECTION INTRAMUSCULAR; INTRAVENOUS; SUBCUTANEOUS at 18:29

## 2024-10-06 RX ADMIN — PROMETHAZINE HYDROCHLORIDE 25 MG: 25 TABLET ORAL at 18:21

## 2024-10-06 RX ADMIN — PANCRELIPASE LIPASE, PANCRELIPASE PROTEASE, PANCRELIPASE AMYLASE 5000 UNITS: 5000; 17000; 24000 CAPSULE, DELAYED RELEASE ORAL at 12:14

## 2024-10-06 RX ADMIN — ONDANSETRON 4 MG: 2 INJECTION INTRAMUSCULAR; INTRAVENOUS at 15:19

## 2024-10-06 RX ADMIN — PANCRELIPASE LIPASE, PANCRELIPASE PROTEASE, PANCRELIPASE AMYLASE 5000 UNITS: 5000; 17000; 24000 CAPSULE, DELAYED RELEASE ORAL at 09:14

## 2024-10-06 RX ADMIN — PROMETHAZINE HYDROCHLORIDE 25 MG: 25 TABLET ORAL at 04:56

## 2024-10-06 RX ADMIN — SUCRALFATE 1 G: 1 TABLET ORAL at 06:24

## 2024-10-06 RX ADMIN — VALACYCLOVIR HYDROCHLORIDE 1000 MG: 500 TABLET, FILM COATED ORAL at 09:14

## 2024-10-06 RX ADMIN — SUCRALFATE 1 G: 1 TABLET ORAL at 17:05

## 2024-10-06 RX ADMIN — ONDANSETRON 4 MG: 2 INJECTION INTRAMUSCULAR; INTRAVENOUS at 01:36

## 2024-10-06 RX ADMIN — HYDROMORPHONE HYDROCHLORIDE 0.5 MG: 1 INJECTION, SOLUTION INTRAMUSCULAR; INTRAVENOUS; SUBCUTANEOUS at 01:40

## 2024-10-06 RX ADMIN — ATORVASTATIN CALCIUM 40 MG: 40 TABLET, FILM COATED ORAL at 20:39

## 2024-10-06 RX ADMIN — HYDROMORPHONE HYDROCHLORIDE 0.5 MG: 1 INJECTION, SOLUTION INTRAMUSCULAR; INTRAVENOUS; SUBCUTANEOUS at 22:05

## 2024-10-06 RX ADMIN — SODIUM CHLORIDE: 9 INJECTION, SOLUTION INTRAVENOUS at 00:35

## 2024-10-06 RX ADMIN — HYDROMORPHONE HYDROCHLORIDE 0.5 MG: 1 INJECTION, SOLUTION INTRAMUSCULAR; INTRAVENOUS; SUBCUTANEOUS at 12:14

## 2024-10-06 RX ADMIN — SUCRALFATE 1 G: 1 TABLET ORAL at 11:16

## 2024-10-06 RX ADMIN — HYDROMORPHONE HYDROCHLORIDE 0.5 MG: 1 INJECTION, SOLUTION INTRAMUSCULAR; INTRAVENOUS; SUBCUTANEOUS at 04:56

## 2024-10-06 RX ADMIN — HYDROMORPHONE HYDROCHLORIDE 0.5 MG: 1 INJECTION, SOLUTION INTRAMUSCULAR; INTRAVENOUS; SUBCUTANEOUS at 15:19

## 2024-10-06 RX ADMIN — SUCRALFATE 1 G: 1 TABLET ORAL at 20:39

## 2024-10-06 RX ADMIN — PANCRELIPASE LIPASE, PANCRELIPASE PROTEASE, PANCRELIPASE AMYLASE 5000 UNITS: 5000; 17000; 24000 CAPSULE, DELAYED RELEASE ORAL at 17:05

## 2024-10-06 RX ADMIN — PROMETHAZINE HYDROCHLORIDE 25 MG: 25 TABLET ORAL at 12:14

## 2024-10-06 RX ADMIN — HYDROMORPHONE HYDROCHLORIDE 0.5 MG: 1 INJECTION, SOLUTION INTRAMUSCULAR; INTRAVENOUS; SUBCUTANEOUS at 09:15

## 2024-10-06 RX ADMIN — ONDANSETRON 4 MG: 2 INJECTION INTRAMUSCULAR; INTRAVENOUS at 09:15

## 2024-10-06 RX ADMIN — SODIUM CHLORIDE: 9 INJECTION, SOLUTION INTRAVENOUS at 17:18

## 2024-10-06 RX ADMIN — CLOPIDOGREL BISULFATE 75 MG: 75 TABLET ORAL at 09:14

## 2024-10-06 ASSESSMENT — PAIN SCALES - GENERAL
PAINLEVEL_OUTOF10: 6
PAINLEVEL_OUTOF10: 7
PAINLEVEL_OUTOF10: 6
PAINLEVEL_OUTOF10: 7
PAINLEVEL_OUTOF10: 8
PAINLEVEL_OUTOF10: 5
PAINLEVEL_OUTOF10: 9
PAINLEVEL_OUTOF10: 7
PAINLEVEL_OUTOF10: 7
PAINLEVEL_OUTOF10: 6

## 2024-10-06 ASSESSMENT — PAIN DESCRIPTION - LOCATION
LOCATION: ABDOMEN

## 2024-10-06 ASSESSMENT — PAIN DESCRIPTION - DESCRIPTORS: DESCRIPTORS: ACHING;SHARP

## 2024-10-06 ASSESSMENT — PAIN DESCRIPTION - ORIENTATION: ORIENTATION: RIGHT;LEFT;MID

## 2024-10-07 LAB
ANION GAP SERPL CALC-SCNC: 9 MEQ/L (ref 8–16)
BUN SERPL-MCNC: 13 MG/DL (ref 7–22)
CA-I BLD ISE-SCNC: 1.27 MMOL/L (ref 1.12–1.32)
CALCIUM SERPL-MCNC: 8.3 MG/DL (ref 8.5–10.5)
CHLORIDE SERPL-SCNC: 106 MEQ/L (ref 98–111)
CO2 SERPL-SCNC: 24 MEQ/L (ref 23–33)
CREAT SERPL-MCNC: 0.5 MG/DL (ref 0.4–1.2)
GFR SERPL CREATININE-BSD FRML MDRD: > 90 ML/MIN/1.73M2
GLUCOSE BLD STRIP.AUTO-MCNC: 106 MG/DL (ref 70–108)
GLUCOSE BLD STRIP.AUTO-MCNC: 88 MG/DL (ref 70–108)
GLUCOSE BLD STRIP.AUTO-MCNC: 88 MG/DL (ref 70–108)
GLUCOSE SERPL-MCNC: 91 MG/DL (ref 70–108)
MAGNESIUM SERPL-MCNC: 1.9 MG/DL (ref 1.6–2.4)
PHOSPHATE SERPL-MCNC: 4.3 MG/DL (ref 2.4–4.7)
POTASSIUM SERPL-SCNC: 4.3 MEQ/L (ref 3.5–5.2)
SODIUM SERPL-SCNC: 139 MEQ/L (ref 135–145)

## 2024-10-07 PROCEDURE — 82330 ASSAY OF CALCIUM: CPT

## 2024-10-07 PROCEDURE — 36415 COLL VENOUS BLD VENIPUNCTURE: CPT

## 2024-10-07 PROCEDURE — 6360000002 HC RX W HCPCS: Performed by: PHYSICIAN ASSISTANT

## 2024-10-07 PROCEDURE — 6370000000 HC RX 637 (ALT 250 FOR IP): Performed by: PHYSICIAN ASSISTANT

## 2024-10-07 PROCEDURE — 99232 SBSQ HOSP IP/OBS MODERATE 35: CPT | Performed by: PHYSICIAN ASSISTANT

## 2024-10-07 PROCEDURE — 83735 ASSAY OF MAGNESIUM: CPT

## 2024-10-07 PROCEDURE — 6370000000 HC RX 637 (ALT 250 FOR IP): Performed by: NURSE PRACTITIONER

## 2024-10-07 PROCEDURE — 2580000003 HC RX 258: Performed by: PHYSICIAN ASSISTANT

## 2024-10-07 PROCEDURE — 80048 BASIC METABOLIC PNL TOTAL CA: CPT

## 2024-10-07 PROCEDURE — 1200000000 HC SEMI PRIVATE

## 2024-10-07 PROCEDURE — 2500000003 HC RX 250 WO HCPCS: Performed by: PHYSICIAN ASSISTANT

## 2024-10-07 PROCEDURE — 82948 REAGENT STRIP/BLOOD GLUCOSE: CPT

## 2024-10-07 PROCEDURE — 84100 ASSAY OF PHOSPHORUS: CPT

## 2024-10-07 RX ORDER — MAGNESIUM HYDROXIDE/ALUMINUM HYDROXICE/SIMETHICONE 120; 1200; 1200 MG/30ML; MG/30ML; MG/30ML
30 SUSPENSION ORAL EVERY 6 HOURS PRN
Status: DISCONTINUED | OUTPATIENT
Start: 2024-10-07 | End: 2024-10-09 | Stop reason: HOSPADM

## 2024-10-07 RX ADMIN — ATORVASTATIN CALCIUM 40 MG: 40 TABLET, FILM COATED ORAL at 20:57

## 2024-10-07 RX ADMIN — HYDROMORPHONE HYDROCHLORIDE 0.5 MG: 1 INJECTION, SOLUTION INTRAMUSCULAR; INTRAVENOUS; SUBCUTANEOUS at 08:07

## 2024-10-07 RX ADMIN — PANCRELIPASE LIPASE, PANCRELIPASE PROTEASE, PANCRELIPASE AMYLASE 5000 UNITS: 5000; 17000; 24000 CAPSULE, DELAYED RELEASE ORAL at 17:41

## 2024-10-07 RX ADMIN — OXYCODONE 10 MG: 5 TABLET ORAL at 19:21

## 2024-10-07 RX ADMIN — HYDROMORPHONE HYDROCHLORIDE 0.5 MG: 1 INJECTION, SOLUTION INTRAMUSCULAR; INTRAVENOUS; SUBCUTANEOUS at 11:28

## 2024-10-07 RX ADMIN — SUCRALFATE 1 G: 1 TABLET ORAL at 12:32

## 2024-10-07 RX ADMIN — ALUMINUM HYDROXIDE, MAGNESIUM HYDROXIDE, AND SIMETHICONE 30 ML: 200; 200; 20 SUSPENSION ORAL at 14:53

## 2024-10-07 RX ADMIN — SUCRALFATE 1 G: 1 TABLET ORAL at 20:57

## 2024-10-07 RX ADMIN — SODIUM CHLORIDE: 234 INJECTION INTRAMUSCULAR; INTRAVENOUS; SUBCUTANEOUS at 17:43

## 2024-10-07 RX ADMIN — ONDANSETRON 4 MG: 2 INJECTION INTRAMUSCULAR; INTRAVENOUS at 14:52

## 2024-10-07 RX ADMIN — SUCRALFATE 1 G: 1 TABLET ORAL at 05:42

## 2024-10-07 RX ADMIN — ONDANSETRON 4 MG: 2 INJECTION INTRAMUSCULAR; INTRAVENOUS at 23:30

## 2024-10-07 RX ADMIN — ENOXAPARIN SODIUM 40 MG: 100 INJECTION SUBCUTANEOUS at 20:57

## 2024-10-07 RX ADMIN — OXYCODONE 10 MG: 5 TABLET ORAL at 23:29

## 2024-10-07 RX ADMIN — PROMETHAZINE HYDROCHLORIDE 25 MG: 25 TABLET ORAL at 11:28

## 2024-10-07 RX ADMIN — PANTOPRAZOLE SODIUM 40 MG: 40 TABLET, DELAYED RELEASE ORAL at 05:42

## 2024-10-07 RX ADMIN — PANCRELIPASE LIPASE, PANCRELIPASE PROTEASE, PANCRELIPASE AMYLASE 5000 UNITS: 5000; 17000; 24000 CAPSULE, DELAYED RELEASE ORAL at 12:32

## 2024-10-07 RX ADMIN — PANCRELIPASE LIPASE, PANCRELIPASE PROTEASE, PANCRELIPASE AMYLASE 5000 UNITS: 5000; 17000; 24000 CAPSULE, DELAYED RELEASE ORAL at 08:08

## 2024-10-07 RX ADMIN — SODIUM CHLORIDE: 9 INJECTION, SOLUTION INTRAVENOUS at 11:33

## 2024-10-07 RX ADMIN — VALACYCLOVIR HYDROCHLORIDE 1000 MG: 500 TABLET, FILM COATED ORAL at 08:08

## 2024-10-07 RX ADMIN — DOCUSATE SODIUM 100 MG: 100 CAPSULE, LIQUID FILLED ORAL at 20:57

## 2024-10-07 RX ADMIN — HYDROMORPHONE HYDROCHLORIDE 0.5 MG: 1 INJECTION, SOLUTION INTRAMUSCULAR; INTRAVENOUS; SUBCUTANEOUS at 03:50

## 2024-10-07 RX ADMIN — SUCRALFATE 1 G: 1 TABLET ORAL at 17:40

## 2024-10-07 RX ADMIN — ONDANSETRON 4 MG: 2 INJECTION INTRAMUSCULAR; INTRAVENOUS at 08:07

## 2024-10-07 RX ADMIN — SODIUM CHLORIDE: 9 INJECTION, SOLUTION INTRAVENOUS at 21:05

## 2024-10-07 RX ADMIN — OXYCODONE 10 MG: 5 TABLET ORAL at 14:52

## 2024-10-07 RX ADMIN — CLOPIDOGREL BISULFATE 75 MG: 75 TABLET ORAL at 08:06

## 2024-10-07 RX ADMIN — DOCUSATE SODIUM 100 MG: 100 CAPSULE, LIQUID FILLED ORAL at 08:06

## 2024-10-07 ASSESSMENT — PAIN DESCRIPTION - LOCATION
LOCATION: ABDOMEN

## 2024-10-07 ASSESSMENT — PAIN DESCRIPTION - ORIENTATION
ORIENTATION: LEFT
ORIENTATION: RIGHT;LEFT;MID
ORIENTATION: RIGHT;LEFT;MID

## 2024-10-07 ASSESSMENT — PAIN SCALES - GENERAL
PAINLEVEL_OUTOF10: 7
PAINLEVEL_OUTOF10: 6
PAINLEVEL_OUTOF10: 6
PAINLEVEL_OUTOF10: 7

## 2024-10-07 ASSESSMENT — PAIN - FUNCTIONAL ASSESSMENT: PAIN_FUNCTIONAL_ASSESSMENT: ACTIVITIES ARE NOT PREVENTED

## 2024-10-07 ASSESSMENT — PAIN DESCRIPTION - DESCRIPTORS
DESCRIPTORS: ACHING
DESCRIPTORS: ACHING;SHARP
DESCRIPTORS: ACHING;STABBING

## 2024-10-07 NOTE — CARE COORDINATION
Referral for TPN faxed to [facility] with required documents. Electronically signed by Staci Avery RN on 10/7/24 at 10:23 AM EDT

## 2024-10-07 NOTE — CARE COORDINATION
Per phone conversation with Dr. Dahl, TPN is estimated to be needed for two months, update to [facility]. They will review and let this writer know if they will be able to accommodate the order. Electronically signed by Staci Avery RN on 10/7/24 at 8:35 AM EDT      Per Agustin at [facility] they are not able to provide her TPN due to lack of supply. Electronically signed by Staci Avery RN on 10/7/24 at 9:25 AM EDT

## 2024-10-07 NOTE — CARE COORDINATION
Ana requests to use [facility] for her TPN. Referral to [facility] cancelled and referral sent to [facility]. Electronically signed by Staci Avery RN on 10/7/24 at 1:48 PM EDT

## 2024-10-07 NOTE — CARE COORDINATION
10/7/24, 1:52 PM EDT    DISCHARGE ON GOING EVALUATION    [facility] day: 6  Location: -03/003-A Reason for admit: Generalized abdominal pain [R10.84]  Moderate malnutrition (HCC) [E44.0]  Malnutrition, unspecified type (HCC) [E46]  Intractable abdominal pain [R10.9]       Barriers to Discharge: Patient not tolerating food and enteral feedings. She continues to have nausea and pain. Hospitalist attending, GI following for TPN, Pharmacy for dosing TPN, Dietician, prn IV Dilaudid OTC, IV Zofran and oral Phenergan, ISS, Lovenox, daily labs, SCD's, up with assistance.    PCP: Usha Amaya MD  Readmission Risk Score: 16.4    Patient Goals/Plan/Treatment Preferences: Planning for home with TPN. Ana [facility] pharmacy is listed as in network with her insurance. Referral for TPN faxed. Awaiting acceptance/denial. Her preference for Home Health is [facility], referral sent.

## 2024-10-08 LAB
ANION GAP SERPL CALC-SCNC: 10 MEQ/L (ref 8–16)
BUN SERPL-MCNC: 15 MG/DL (ref 7–22)
CA-I BLD ISE-SCNC: 1.23 MMOL/L (ref 1.12–1.32)
CALCIUM SERPL-MCNC: 8.3 MG/DL (ref 8.5–10.5)
CHLORIDE SERPL-SCNC: 109 MEQ/L (ref 98–111)
CO2 SERPL-SCNC: 22 MEQ/L (ref 23–33)
CREAT SERPL-MCNC: 0.5 MG/DL (ref 0.4–1.2)
GFR SERPL CREATININE-BSD FRML MDRD: > 90 ML/MIN/1.73M2
GLUCOSE BLD STRIP.AUTO-MCNC: 92 MG/DL (ref 70–108)
GLUCOSE BLD STRIP.AUTO-MCNC: 93 MG/DL (ref 70–108)
GLUCOSE BLD STRIP.AUTO-MCNC: 97 MG/DL (ref 70–108)
GLUCOSE BLD STRIP.AUTO-MCNC: 99 MG/DL (ref 70–108)
GLUCOSE SERPL-MCNC: 84 MG/DL (ref 70–108)
MAGNESIUM SERPL-MCNC: 2 MG/DL (ref 1.6–2.4)
PHOSPHATE SERPL-MCNC: 3.5 MG/DL (ref 2.4–4.7)
POTASSIUM SERPL-SCNC: 4.2 MEQ/L (ref 3.5–5.2)
SODIUM SERPL-SCNC: 141 MEQ/L (ref 135–145)

## 2024-10-08 PROCEDURE — 2580000003 HC RX 258: Performed by: PHYSICIAN ASSISTANT

## 2024-10-08 PROCEDURE — 80048 BASIC METABOLIC PNL TOTAL CA: CPT

## 2024-10-08 PROCEDURE — 6370000000 HC RX 637 (ALT 250 FOR IP): Performed by: NURSE PRACTITIONER

## 2024-10-08 PROCEDURE — 82948 REAGENT STRIP/BLOOD GLUCOSE: CPT

## 2024-10-08 PROCEDURE — 84100 ASSAY OF PHOSPHORUS: CPT

## 2024-10-08 PROCEDURE — 82330 ASSAY OF CALCIUM: CPT

## 2024-10-08 PROCEDURE — 6370000000 HC RX 637 (ALT 250 FOR IP): Performed by: PHYSICIAN ASSISTANT

## 2024-10-08 PROCEDURE — 2500000003 HC RX 250 WO HCPCS: Performed by: PHYSICIAN ASSISTANT

## 2024-10-08 PROCEDURE — 83735 ASSAY OF MAGNESIUM: CPT

## 2024-10-08 PROCEDURE — 36415 COLL VENOUS BLD VENIPUNCTURE: CPT

## 2024-10-08 PROCEDURE — 99232 SBSQ HOSP IP/OBS MODERATE 35: CPT | Performed by: PHYSICIAN ASSISTANT

## 2024-10-08 PROCEDURE — 1200000000 HC SEMI PRIVATE

## 2024-10-08 PROCEDURE — 6360000002 HC RX W HCPCS: Performed by: PHYSICIAN ASSISTANT

## 2024-10-08 RX ORDER — ENOXAPARIN SODIUM 100 MG/ML
30 INJECTION SUBCUTANEOUS EVERY 24 HOURS
Status: DISCONTINUED | OUTPATIENT
Start: 2024-10-08 | End: 2024-10-09 | Stop reason: HOSPADM

## 2024-10-08 RX ORDER — OXYCODONE HYDROCHLORIDE 5 MG/1
5 TABLET ORAL EVERY 6 HOURS PRN
Status: DISCONTINUED | OUTPATIENT
Start: 2024-10-08 | End: 2024-10-09 | Stop reason: HOSPADM

## 2024-10-08 RX ORDER — OXYCODONE HYDROCHLORIDE 5 MG/1
10 TABLET ORAL EVERY 6 HOURS PRN
Status: DISCONTINUED | OUTPATIENT
Start: 2024-10-08 | End: 2024-10-09 | Stop reason: HOSPADM

## 2024-10-08 RX ADMIN — ACETAMINOPHEN 650 MG: 325 TABLET ORAL at 15:46

## 2024-10-08 RX ADMIN — SUCRALFATE 1 G: 1 TABLET ORAL at 11:42

## 2024-10-08 RX ADMIN — SODIUM CHLORIDE: 234 INJECTION INTRAMUSCULAR; INTRAVENOUS; SUBCUTANEOUS at 17:37

## 2024-10-08 RX ADMIN — OXYCODONE 10 MG: 5 TABLET ORAL at 09:53

## 2024-10-08 RX ADMIN — ONDANSETRON 4 MG: 2 INJECTION INTRAMUSCULAR; INTRAVENOUS at 15:46

## 2024-10-08 RX ADMIN — OXYCODONE 10 MG: 5 TABLET ORAL at 05:17

## 2024-10-08 RX ADMIN — ATORVASTATIN CALCIUM 40 MG: 40 TABLET, FILM COATED ORAL at 20:21

## 2024-10-08 RX ADMIN — SODIUM CHLORIDE: 9 INJECTION, SOLUTION INTRAVENOUS at 13:20

## 2024-10-08 RX ADMIN — PANCRELIPASE LIPASE, PANCRELIPASE PROTEASE, PANCRELIPASE AMYLASE 5000 UNITS: 5000; 17000; 24000 CAPSULE, DELAYED RELEASE ORAL at 11:42

## 2024-10-08 RX ADMIN — SODIUM CHLORIDE, PRESERVATIVE FREE 10 ML: 5 INJECTION INTRAVENOUS at 08:46

## 2024-10-08 RX ADMIN — SUCRALFATE 1 G: 1 TABLET ORAL at 20:21

## 2024-10-08 RX ADMIN — ONDANSETRON 4 MG: 2 INJECTION INTRAMUSCULAR; INTRAVENOUS at 08:40

## 2024-10-08 RX ADMIN — ONDANSETRON 4 MG: 2 INJECTION INTRAMUSCULAR; INTRAVENOUS at 22:35

## 2024-10-08 RX ADMIN — VALACYCLOVIR HYDROCHLORIDE 1000 MG: 500 TABLET, FILM COATED ORAL at 08:41

## 2024-10-08 RX ADMIN — SODIUM CHLORIDE: 9 INJECTION, SOLUTION INTRAVENOUS at 21:30

## 2024-10-08 RX ADMIN — SUCRALFATE 1 G: 1 TABLET ORAL at 15:53

## 2024-10-08 RX ADMIN — PANTOPRAZOLE SODIUM 40 MG: 40 TABLET, DELAYED RELEASE ORAL at 05:17

## 2024-10-08 RX ADMIN — OXYCODONE 10 MG: 5 TABLET ORAL at 22:35

## 2024-10-08 RX ADMIN — SUCRALFATE 1 G: 1 TABLET ORAL at 05:17

## 2024-10-08 RX ADMIN — SODIUM CHLORIDE: 9 INJECTION, SOLUTION INTRAVENOUS at 05:28

## 2024-10-08 RX ADMIN — DOCUSATE SODIUM 100 MG: 100 CAPSULE, LIQUID FILLED ORAL at 20:21

## 2024-10-08 RX ADMIN — PANCRELIPASE LIPASE, PANCRELIPASE PROTEASE, PANCRELIPASE AMYLASE 5000 UNITS: 5000; 17000; 24000 CAPSULE, DELAYED RELEASE ORAL at 17:40

## 2024-10-08 RX ADMIN — CLOPIDOGREL BISULFATE 75 MG: 75 TABLET ORAL at 09:53

## 2024-10-08 RX ADMIN — OXYCODONE 10 MG: 5 TABLET ORAL at 15:53

## 2024-10-08 RX ADMIN — BUPROPION HYDROCHLORIDE 300 MG: 300 TABLET, EXTENDED RELEASE ORAL at 08:41

## 2024-10-08 ASSESSMENT — PAIN DESCRIPTION - LOCATION
LOCATION: GENERALIZED
LOCATION: ABDOMEN

## 2024-10-08 ASSESSMENT — PAIN DESCRIPTION - ORIENTATION
ORIENTATION: LEFT
ORIENTATION: LEFT
ORIENTATION: RIGHT;LEFT;MID
ORIENTATION: LEFT;UPPER
ORIENTATION: RIGHT;LEFT;MID

## 2024-10-08 ASSESSMENT — PAIN SCALES - GENERAL
PAINLEVEL_OUTOF10: 7
PAINLEVEL_OUTOF10: 6
PAINLEVEL_OUTOF10: 7
PAINLEVEL_OUTOF10: 5

## 2024-10-08 ASSESSMENT — PAIN DESCRIPTION - DESCRIPTORS
DESCRIPTORS: SHARP
DESCRIPTORS: ACHING;SHARP
DESCRIPTORS: SHARP
DESCRIPTORS: ACHING;SHARP;SHOOTING
DESCRIPTORS: STABBING;SHARP
DESCRIPTORS: ACHING;SORE

## 2024-10-08 NOTE — CARE COORDINATION
Ana states she spoke to [facility]. She requests her information be sent to them for TPN approval. Requested information faxed. Electronically signed by Staci Avery RN on 10/8/24 at 2:56 PM EDT

## 2024-10-08 NOTE — CARE COORDINATION
Alisha from [facility] pharmacy phoned and states they are not able to provide TPN to patient based on her insurance requirements for it. Copy of letter and insurance requirements delivered to Ana. Ana states she will call her insurance. Update to attending, Catrachita SINGH and  primary RN, Elba. Plan for discharge tomorrow. Electronically signed by Staci Avery RN on 10/8/24 at 1:32 PM EDT

## 2024-10-09 VITALS
TEMPERATURE: 98.8 F | HEART RATE: 67 BPM | OXYGEN SATURATION: 100 % | DIASTOLIC BLOOD PRESSURE: 76 MMHG | HEIGHT: 60 IN | SYSTOLIC BLOOD PRESSURE: 88 MMHG | RESPIRATION RATE: 20 BRPM | WEIGHT: 107.2 LBS | BODY MASS INDEX: 21.05 KG/M2

## 2024-10-09 LAB
ANION GAP SERPL CALC-SCNC: 9 MEQ/L (ref 8–16)
BUN SERPL-MCNC: 13 MG/DL (ref 7–22)
CA-I BLD ISE-SCNC: 1.24 MMOL/L (ref 1.12–1.32)
CALCIUM SERPL-MCNC: 8.5 MG/DL (ref 8.5–10.5)
CHLORIDE SERPL-SCNC: 107 MEQ/L (ref 98–111)
CO2 SERPL-SCNC: 22 MEQ/L (ref 23–33)
CREAT SERPL-MCNC: 0.6 MG/DL (ref 0.4–1.2)
DEPRECATED RDW RBC AUTO: 43.2 FL (ref 35–45)
ERYTHROCYTE [DISTWIDTH] IN BLOOD BY AUTOMATED COUNT: 12.6 % (ref 11.5–14.5)
GFR SERPL CREATININE-BSD FRML MDRD: > 90 ML/MIN/1.73M2
GLUCOSE BLD STRIP.AUTO-MCNC: 104 MG/DL (ref 70–108)
GLUCOSE BLD STRIP.AUTO-MCNC: 94 MG/DL (ref 70–108)
GLUCOSE SERPL-MCNC: 88 MG/DL (ref 70–108)
HCT VFR BLD AUTO: 31.8 % (ref 37–47)
HGB BLD-MCNC: 10.5 GM/DL (ref 12–16)
MAGNESIUM SERPL-MCNC: 1.9 MG/DL (ref 1.6–2.4)
MCH RBC QN AUTO: 31.3 PG (ref 26–33)
MCHC RBC AUTO-ENTMCNC: 33 GM/DL (ref 32.2–35.5)
MCV RBC AUTO: 94.6 FL (ref 81–99)
PHOSPHATE SERPL-MCNC: 3.6 MG/DL (ref 2.4–4.7)
PLATELET # BLD AUTO: 198 THOU/MM3 (ref 130–400)
PMV BLD AUTO: 10 FL (ref 9.4–12.4)
POTASSIUM SERPL-SCNC: 4.4 MEQ/L (ref 3.5–5.2)
PREALB SERPL-MCNC: 21.5 MG/DL (ref 20–40)
RBC # BLD AUTO: 3.36 MILL/MM3 (ref 4.2–5.4)
SODIUM SERPL-SCNC: 138 MEQ/L (ref 135–145)
TRIGL SERPL-MCNC: 60 MG/DL (ref 0–199)
WBC # BLD AUTO: 3.8 THOU/MM3 (ref 4.8–10.8)

## 2024-10-09 PROCEDURE — 80048 BASIC METABOLIC PNL TOTAL CA: CPT

## 2024-10-09 PROCEDURE — 6370000000 HC RX 637 (ALT 250 FOR IP): Performed by: PHYSICIAN ASSISTANT

## 2024-10-09 PROCEDURE — 84100 ASSAY OF PHOSPHORUS: CPT

## 2024-10-09 PROCEDURE — 85027 COMPLETE CBC AUTOMATED: CPT

## 2024-10-09 PROCEDURE — 99232 SBSQ HOSP IP/OBS MODERATE 35: CPT | Performed by: PHYSICIAN ASSISTANT

## 2024-10-09 PROCEDURE — 6360000002 HC RX W HCPCS: Performed by: PHYSICIAN ASSISTANT

## 2024-10-09 PROCEDURE — 82948 REAGENT STRIP/BLOOD GLUCOSE: CPT

## 2024-10-09 PROCEDURE — 36415 COLL VENOUS BLD VENIPUNCTURE: CPT

## 2024-10-09 PROCEDURE — 2580000003 HC RX 258: Performed by: PHYSICIAN ASSISTANT

## 2024-10-09 PROCEDURE — 84134 ASSAY OF PREALBUMIN: CPT

## 2024-10-09 PROCEDURE — 84478 ASSAY OF TRIGLYCERIDES: CPT

## 2024-10-09 PROCEDURE — 83735 ASSAY OF MAGNESIUM: CPT

## 2024-10-09 PROCEDURE — 82330 ASSAY OF CALCIUM: CPT

## 2024-10-09 PROCEDURE — 6370000000 HC RX 637 (ALT 250 FOR IP): Performed by: NURSE PRACTITIONER

## 2024-10-09 RX ORDER — PROMETHAZINE HYDROCHLORIDE 25 MG/1
25 TABLET ORAL EVERY 6 HOURS PRN
Qty: 10 TABLET | Refills: 0 | Status: SHIPPED | OUTPATIENT
Start: 2024-10-09 | End: 2024-10-16

## 2024-10-09 RX ORDER — OXYCODONE HYDROCHLORIDE 10 MG/1
10 TABLET ORAL EVERY 6 HOURS PRN
Qty: 24 TABLET | Refills: 0 | Status: SHIPPED | OUTPATIENT
Start: 2024-10-09 | End: 2024-10-15

## 2024-10-09 RX ORDER — PSEUDOEPHEDRINE HCL 30 MG
100 TABLET ORAL 2 TIMES DAILY
Qty: 60 CAPSULE | Refills: 0 | Status: SHIPPED | OUTPATIENT
Start: 2024-10-09

## 2024-10-09 RX ADMIN — PANTOPRAZOLE SODIUM 40 MG: 40 TABLET, DELAYED RELEASE ORAL at 05:02

## 2024-10-09 RX ADMIN — PANCRELIPASE LIPASE, PANCRELIPASE PROTEASE, PANCRELIPASE AMYLASE 5000 UNITS: 5000; 17000; 24000 CAPSULE, DELAYED RELEASE ORAL at 09:34

## 2024-10-09 RX ADMIN — ONDANSETRON 4 MG: 2 INJECTION INTRAMUSCULAR; INTRAVENOUS at 05:03

## 2024-10-09 RX ADMIN — DOCUSATE SODIUM 100 MG: 100 CAPSULE, LIQUID FILLED ORAL at 09:30

## 2024-10-09 RX ADMIN — SUCRALFATE 1 G: 1 TABLET ORAL at 05:02

## 2024-10-09 RX ADMIN — SODIUM CHLORIDE, PRESERVATIVE FREE 10 ML: 5 INJECTION INTRAVENOUS at 09:32

## 2024-10-09 RX ADMIN — PROMETHAZINE HYDROCHLORIDE 25 MG: 25 TABLET ORAL at 09:30

## 2024-10-09 RX ADMIN — OXYCODONE 10 MG: 5 TABLET ORAL at 11:36

## 2024-10-09 RX ADMIN — SODIUM CHLORIDE: 9 INJECTION, SOLUTION INTRAVENOUS at 06:49

## 2024-10-09 RX ADMIN — PANCRELIPASE LIPASE, PANCRELIPASE PROTEASE, PANCRELIPASE AMYLASE 5000 UNITS: 5000; 17000; 24000 CAPSULE, DELAYED RELEASE ORAL at 14:49

## 2024-10-09 RX ADMIN — CLOPIDOGREL BISULFATE 75 MG: 75 TABLET ORAL at 09:30

## 2024-10-09 RX ADMIN — OXYCODONE 10 MG: 5 TABLET ORAL at 04:59

## 2024-10-09 RX ADMIN — VALACYCLOVIR HYDROCHLORIDE 1000 MG: 500 TABLET, FILM COATED ORAL at 09:34

## 2024-10-09 ASSESSMENT — PAIN SCALES - GENERAL
PAINLEVEL_OUTOF10: 7

## 2024-10-09 ASSESSMENT — PAIN DESCRIPTION - DESCRIPTORS
DESCRIPTORS: ACHING;SHOOTING
DESCRIPTORS: ACHING;SHARP;STABBING

## 2024-10-09 ASSESSMENT — PAIN DESCRIPTION - LOCATION
LOCATION: ABDOMEN
LOCATION: ABDOMEN

## 2024-10-09 ASSESSMENT — PAIN DESCRIPTION - ORIENTATION
ORIENTATION: RIGHT;LEFT;MID
ORIENTATION: LEFT

## 2024-10-09 NOTE — PLAN OF CARE
Problem: Pain  Goal: Verbalizes/displays adequate comfort level or baseline comfort level  10/2/2024 2154 by Lakshmi Fitzgerald RN  Outcome: Progressing  Flowsheets (Taken 10/2/2024 0928 by Sonja Alonzo RN)  Verbalizes/displays adequate comfort level or baseline comfort level:   Encourage patient to monitor pain and request assistance   Assess pain using appropriate pain scale   Administer analgesics based on type and severity of pain and evaluate response   Implement non-pharmacological measures as appropriate and evaluate response  10/2/2024 0928 by Sonja Alonzo RN  Outcome: Progressing  Flowsheets (Taken 10/2/2024 0928)  Verbalizes/displays adequate comfort level or baseline comfort level:   Encourage patient to monitor pain and request assistance   Assess pain using appropriate pain scale   Administer analgesics based on type and severity of pain and evaluate response   Implement non-pharmacological measures as appropriate and evaluate response     Problem: Safety - Adult  Goal: Free from fall injury  10/2/2024 2154 by Lakshmi Fitzgerald RN  Outcome: Progressing  Flowsheets (Taken 10/2/2024 0928 by Sonja Alonzo RN)  Free From Fall Injury: Instruct family/caregiver on patient safety  10/2/2024 0928 by Sonja Alonzo RN  Outcome: Progressing  Flowsheets (Taken 10/2/2024 0928)  Free From Fall Injury: Instruct family/caregiver on patient safety     Problem: Skin/Tissue Integrity - Adult  Goal: Skin integrity remains intact  10/2/2024 2154 by Lakshmi Fitzgerald RN  Outcome: Progressing  Flowsheets (Taken 10/2/2024 0928 by Sonja Alonzo RN)  Skin Integrity Remains Intact: Monitor for areas of redness and/or skin breakdown  10/2/2024 0928 by Sonja Alonzo RN  Outcome: Progressing  Flowsheets (Taken 10/2/2024 0928)  Skin Integrity Remains Intact: Monitor for areas of redness and/or skin breakdown     Problem: Musculoskeletal - Adult  Goal: Return mobility to safest level of function  10/2/2024 2154 by Lakshmi Fitzgerald 
  Problem: Pain  Goal: Verbalizes/displays adequate comfort level or baseline comfort level  10/3/2024 1008 by Rosangela Jeffers RN  Outcome: Progressing   Patient's pain will be acceptable for her  Problem: Safety - Adult  Goal: Free from fall injury  10/3/2024 1008 by Rosangela Jeffers RN  Outcome: Progressing   Patient will be free from fall  Problem: Skin/Tissue Integrity - Adult  Goal: Skin integrity remains intact  10/3/2024 1008 by Rosangela Jeffers RN  Outcome: Progressing  Flowsheets (Taken 10/3/2024 1008)  Skin Integrity Remains Intact: Monitor for areas of redness and/or skin breakdown   Patient skin will remain without injury or breakdown  Problem: Musculoskeletal - Adult  Goal: Return mobility to safest level of function  10/3/2024 1008 by Rosangela Jeffers RN  Outcome: Progressing   Patient will be able to move around safely  Problem: Infection - Adult  Goal: Absence of infection at discharge  10/3/2024 1008 by Rosangela Jeffers RN  Outcome: Progressing   Patient will be free from s/s of infection  Problem: Discharge Planning  Goal: Discharge to home or other facility with appropriate resources  10/3/2024 1008 by Rosangela Jeffers RN  Outcome: Progressing   Patient will be discharged home when is stable  
  Problem: Pain  Goal: Verbalizes/displays adequate comfort level or baseline comfort level  10/6/2024 0923 by Leigh Pate RN  Outcome: Progressing  10/6/2024 0525 by Mitchell Bundy RN  Outcome: Progressing     Problem: Safety - Adult  Goal: Free from fall injury  10/6/2024 0923 by Leigh Pate RN  Outcome: Progressing  10/6/2024 0525 by Mitchell Bundy RN  Outcome: Progressing     Problem: Skin/Tissue Integrity - Adult  Goal: Skin integrity remains intact  10/6/2024 0923 by Leigh Pate RN  Outcome: Progressing  Flowsheets (Taken 10/6/2024 0920)  Skin Integrity Remains Intact: Monitor for areas of redness and/or skin breakdown  10/6/2024 0525 by Mitchell Bundy RN  Outcome: Progressing  Flowsheets (Taken 10/5/2024 2130)  Skin Integrity Remains Intact: Monitor for areas of redness and/or skin breakdown     Problem: Musculoskeletal - Adult  Goal: Return mobility to safest level of function  10/6/2024 0923 by Leigh Pate RN  Outcome: Progressing  Flowsheets (Taken 10/6/2024 0920)  Return Mobility to Safest Level of Function: Assess patient stability and activity tolerance for standing, transferring and ambulating with or without assistive devices  10/6/2024 0525 by Mitchell Bundy RN  Outcome: Progressing  Flowsheets (Taken 10/5/2024 2130)  Return Mobility to Safest Level of Function:   Assess patient stability and activity tolerance for standing, transferring and ambulating with or without assistive devices   Assist with transfers and ambulation using safe patient handling equipment as needed   Ensure adequate protection for wounds/incisions during mobilization     
  Problem: Pain  Goal: Verbalizes/displays adequate comfort level or baseline comfort level  10/6/2024 2156 by Silvia Leach RN  Outcome: Progressing  Flowsheets (Taken 10/6/2024 2156)  Verbalizes/displays adequate comfort level or baseline comfort level:   Encourage patient to monitor pain and request assistance   Assess pain using appropriate pain scale   Administer analgesics based on type and severity of pain and evaluate response   Implement non-pharmacological measures as appropriate and evaluate response   Notify Licensed Independent Practitioner if interventions unsuccessful or patient reports new pain  10/6/2024 0923 by Leigh Pate RN  Outcome: Progressing     Problem: Safety - Adult  Goal: Free from fall injury  10/6/2024 2156 by Silvia Leach RN  Outcome: Progressing  Flowsheets (Taken 10/6/2024 2156)  Free From Fall Injury: Instruct family/caregiver on patient safety  10/6/2024 0923 by Leigh Pate RN  Outcome: Progressing     Problem: Skin/Tissue Integrity - Adult  Goal: Skin integrity remains intact  10/6/2024 2156 by Silvia Leach RN  Outcome: Progressing  Flowsheets (Taken 10/6/2024 2156)  Skin Integrity Remains Intact: Monitor for areas of redness and/or skin breakdown  10/6/2024 0923 by Leigh Pate RN  Outcome: Progressing  Flowsheets (Taken 10/6/2024 0920)  Skin Integrity Remains Intact: Monitor for areas of redness and/or skin breakdown     Problem: Musculoskeletal - Adult  Goal: Return mobility to safest level of function  10/6/2024 2156 by Silvia Leach RN  Outcome: Progressing  Flowsheets (Taken 10/6/2024 2156)  Return Mobility to Safest Level of Function: Assess patient stability and activity tolerance for standing, transferring and ambulating with or without assistive devices  10/6/2024 0923 by Leigh Pate RN  Outcome: Progressing  Flowsheets (Taken 10/6/2024 0920)  Return Mobility to Safest Level of Function: Assess patient stability and activity 
  Problem: Pain  Goal: Verbalizes/displays adequate comfort level or baseline comfort level  10/8/2024 1025 by Dariela Gonzales RN  Outcome: Progressing  Flowsheets (Taken 10/8/2024 1025)  Verbalizes/displays adequate comfort level or baseline comfort level:   Encourage patient to monitor pain and request assistance   Assess pain using appropriate pain scale     Problem: Safety - Adult  Goal: Free from fall injury  10/8/2024 1025 by Dariela Gonzales RN  Outcome: Progressing  Flowsheets (Taken 10/8/2024 1025)  Free From Fall Injury: Instruct family/caregiver on patient safety     Problem: Skin/Tissue Integrity - Adult  Goal: Skin integrity remains intact  10/8/2024 1025 by Dariela Gonzales RN  Outcome: Progressing  Flowsheets (Taken 10/8/2024 1025)  Skin Integrity Remains Intact: Monitor for areas of redness and/or skin breakdown     Problem: Musculoskeletal - Adult  Goal: Return mobility to safest level of function  10/8/2024 1025 by Dariela Gonzales RN  Outcome: Progressing  Flowsheets (Taken 10/8/2024 1025)  Return Mobility to Safest Level of Function: Assess patient stability and activity tolerance for standing, transferring and ambulating with or without assistive devices     Problem: Gastrointestinal - Adult  Goal: Minimal or absence of nausea and vomiting  10/8/2024 1025 by Dariela Gonzales RN  Outcome: Progressing  Flowsheets (Taken 10/8/2024 1025)  Minimal or absence of nausea and vomiting: Administer IV fluids as ordered to ensure adequate hydration     Problem: Gastrointestinal - Adult  Goal: Maintains or returns to baseline bowel function  10/8/2024 1025 by Dariela Gonzales RN  Outcome: Progressing     Problem: Gastrointestinal - Adult  Goal: Maintains adequate nutritional intake  10/8/2024 1025 by Dariela Gonzales RN  Outcome: Progressing     Problem: Infection - Adult  Goal: Absence of infection at discharge  10/8/2024 1025 by Dariela Gonzales RN  Outcome: Progressing  Flowsheets (Taken 
  Problem: Pain  Goal: Verbalizes/displays adequate comfort level or baseline comfort level  10/9/2024 1618 by Ammy Crespo RN  Outcome: Adequate for Discharge     Problem: Safety - Adult  Goal: Free from fall injury  10/9/2024 1618 by Ammy Crespo RN  Outcome: Adequate for Discharge     Problem: Skin/Tissue Integrity - Adult  Goal: Skin integrity remains intact  10/9/2024 1618 by Ammy Crespo RN  Outcome: Adequate for Discharge     Problem: Musculoskeletal - Adult  Goal: Return mobility to safest level of function  10/9/2024 1618 by Ammy Crespo RN  Outcome: Adequate for Discharge     Problem: Gastrointestinal - Adult  Goal: Minimal or absence of nausea and vomiting  10/9/2024 1618 by Ammy Crespo RN  Outcome: Adequate for Discharge     Problem: Gastrointestinal - Adult  Goal: Maintains or returns to baseline bowel function  10/9/2024 1618 by Ammy Crespo RN  Outcome: Adequate for Discharge     Problem: Gastrointestinal - Adult  Goal: Maintains adequate nutritional intake  10/9/2024 1618 by Ammy Crespo RN  Outcome: Adequate for Discharge     Problem: Infection - Adult  Goal: Absence of infection at discharge  10/9/2024 1618 by Ammy Crespo RN  Outcome: Adequate for Discharge     Problem: Infection - Adult  Goal: Absence of infection during hospitalization  10/9/2024 1618 by Ammy Crespo RN  Outcome: Adequate for Discharge     Problem: Discharge Planning  Goal: Discharge to home or other facility with appropriate resources  10/9/2024 1618 by Ammy Crespo RN  Outcome: Adequate for Discharge     Problem: Skin/Tissue Integrity  Goal: Absence of new skin breakdown  Description: 1.  Monitor for areas of redness and/or skin breakdown  2.  Assess vascular access sites hourly  3.  Every 4-6 hours minimum:  Change oxygen saturation probe site  4.  Every 4-6 hours:  If on nasal continuous positive airway pressure, respiratory therapy assess nares and determine need 
  Problem: Pain  Goal: Verbalizes/displays adequate comfort level or baseline comfort level  Outcome: Progressing     Problem: Safety - Adult  Goal: Free from fall injury  Outcome: Progressing     Problem: Skin/Tissue Integrity - Adult  Goal: Skin integrity remains intact  Outcome: Progressing  Flowsheets (Taken 10/4/2024 2000)  Skin Integrity Remains Intact: Monitor for areas of redness and/or skin breakdown     Problem: Musculoskeletal - Adult  Goal: Return mobility to safest level of function  Outcome: Progressing  Flowsheets (Taken 10/4/2024 2000)  Return Mobility to Safest Level of Function:   Assess patient stability and activity tolerance for standing, transferring and ambulating with or without assistive devices   Assist with transfers and ambulation using safe patient handling equipment as needed   Ensure adequate protection for wounds/incisions during mobilization     Problem: Gastrointestinal - Adult  Goal: Minimal or absence of nausea and vomiting  Outcome: Progressing  Flowsheets (Taken 10/4/2024 2000)  Minimal or absence of nausea and vomiting:   Administer IV fluids as ordered to ensure adequate hydration   Nasogastric tube to low intermittent suction as ordered     Problem: Gastrointestinal - Adult  Goal: Maintains or returns to baseline bowel function  Outcome: Progressing  Flowsheets (Taken 10/4/2024 2000)  Maintains or returns to baseline bowel function:   Assess bowel function   Encourage oral fluids to ensure adequate hydration   Administer IV fluids as ordered to ensure adequate hydration     Problem: Gastrointestinal - Adult  Goal: Maintains adequate nutritional intake  Outcome: Progressing  Flowsheets (Taken 10/4/2024 2000)  Maintains adequate nutritional intake:   Monitor percentage of each meal consumed   Identify factors contributing to decreased intake, treat as appropriate     Problem: Infection - Adult  Goal: Absence of infection at discharge  Outcome: Progressing  Flowsheets (Taken 
  Problem: Pain  Goal: Verbalizes/displays adequate comfort level or baseline comfort level  Outcome: Progressing     Problem: Safety - Adult  Goal: Free from fall injury  Outcome: Progressing     Problem: Skin/Tissue Integrity - Adult  Goal: Skin integrity remains intact  Outcome: Progressing  Flowsheets (Taken 10/5/2024 2130)  Skin Integrity Remains Intact: Monitor for areas of redness and/or skin breakdown     Problem: Musculoskeletal - Adult  Goal: Return mobility to safest level of function  Outcome: Progressing  Flowsheets (Taken 10/5/2024 2130)  Return Mobility to Safest Level of Function:   Assess patient stability and activity tolerance for standing, transferring and ambulating with or without assistive devices   Assist with transfers and ambulation using safe patient handling equipment as needed   Ensure adequate protection for wounds/incisions during mobilization     Problem: Gastrointestinal - Adult  Goal: Minimal or absence of nausea and vomiting  Outcome: Progressing  Flowsheets (Taken 10/5/2024 2130)  Minimal or absence of nausea and vomiting:   Administer IV fluids as ordered to ensure adequate hydration   Administer ordered antiemetic medications as needed     Problem: Gastrointestinal - Adult  Goal: Maintains or returns to baseline bowel function  Outcome: Progressing  Flowsheets (Taken 10/5/2024 2130)  Maintains or returns to baseline bowel function:   Assess bowel function   Encourage oral fluids to ensure adequate hydration     Problem: Gastrointestinal - Adult  Goal: Maintains adequate nutritional intake  Outcome: Progressing  Flowsheets (Taken 10/5/2024 2130)  Maintains adequate nutritional intake:   Monitor percentage of each meal consumed   Identify factors contributing to decreased intake, treat as appropriate   Assist with meals as needed     Problem: Infection - Adult  Goal: Absence of infection at discharge  Outcome: Progressing  Flowsheets (Taken 10/5/2024 2130)  Absence of infection at 
  Problem: Pain  Goal: Verbalizes/displays adequate comfort level or baseline comfort level  Outcome: Progressing     Problem: Safety - Adult  Goal: Free from fall injury  Outcome: Progressing     Problem: Skin/Tissue Integrity - Adult  Goal: Skin integrity remains intact  Outcome: Progressing  Flowsheets (Taken 9/29/2024 2006)  Skin Integrity Remains Intact: Monitor for areas of redness and/or skin breakdown     Problem: Musculoskeletal - Adult  Goal: Return mobility to safest level of function  Outcome: Progressing     Problem: Gastrointestinal - Adult  Goal: Minimal or absence of nausea and vomiting  Outcome: Progressing     Problem: Gastrointestinal - Adult  Goal: Maintains or returns to baseline bowel function  Outcome: Progressing  Flowsheets (Taken 9/29/2024 2006)  Maintains or returns to baseline bowel function:   Assess bowel function   Encourage oral fluids to ensure adequate hydration   Administer IV fluids as ordered to ensure adequate hydration     Problem: Gastrointestinal - Adult  Goal: Maintains adequate nutritional intake  Outcome: Progressing  Flowsheets (Taken 9/29/2024 2006)  Maintains adequate nutritional intake:   Identify factors contributing to decreased intake, treat as appropriate   Monitor percentage of each meal consumed   Monitor intake and output, weight and lab values     Problem: Infection - Adult  Goal: Absence of infection at discharge  Outcome: Progressing     Problem: Infection - Adult  Goal: Absence of infection during hospitalization  Outcome: Progressing  Flowsheets (Taken 9/29/2024 2006)  Absence of infection during hospitalization:   Assess and monitor for signs and symptoms of infection   Monitor lab/diagnostic results   Monitor all insertion sites i.e., indwelling lines, tubes and drains     Problem: Discharge Planning  Goal: Discharge to home or other facility with appropriate resources  Outcome: Progressing  Flowsheets  Taken 9/29/2024 2006 by Debbie Kumar RN  Discharge 
  Problem: Pain  Goal: Verbalizes/displays adequate comfort level or baseline comfort level  Outcome: Progressing  Flowsheets (Taken 10/1/2024 1131)  Verbalizes/displays adequate comfort level or baseline comfort level:   Encourage patient to monitor pain and request assistance   Administer analgesics based on type and severity of pain and evaluate response   Assess pain using appropriate pain scale   Implement non-pharmacological measures as appropriate and evaluate response     Problem: Safety - Adult  Goal: Free from fall injury  Outcome: Progressing  Flowsheets (Taken 10/1/2024 1131)  Free From Fall Injury: Instruct family/caregiver on patient safety     Problem: Skin/Tissue Integrity - Adult  Goal: Skin integrity remains intact  Outcome: Progressing  Flowsheets (Taken 10/1/2024 1131)  Skin Integrity Remains Intact: Monitor for areas of redness and/or skin breakdown     Problem: Musculoskeletal - Adult  Goal: Return mobility to safest level of function  Outcome: Progressing  Flowsheets (Taken 10/1/2024 1131)  Return Mobility to Safest Level of Function:   Assess patient stability and activity tolerance for standing, transferring and ambulating with or without assistive devices   Assist with transfers and ambulation using safe patient handling equipment as needed   Ensure adequate protection for wounds/incisions during mobilization   Obtain physical therapy/occupational therapy consults as needed     Problem: Gastrointestinal - Adult  Goal: Minimal or absence of nausea and vomiting  Outcome: Progressing  Flowsheets (Taken 10/1/2024 1131)  Minimal or absence of nausea and vomiting:   Administer IV fluids as ordered to ensure adequate hydration   Administer ordered antiemetic medications as needed   Provide nonpharmacologic comfort measures as appropriate     Problem: Gastrointestinal - Adult  Goal: Maintains or returns to baseline bowel function  Outcome: Progressing  Flowsheets (Taken 10/1/2024 1131)  Maintains 
  Problem: Pain  Goal: Verbalizes/displays adequate comfort level or baseline comfort level  Outcome: Progressing  Flowsheets (Taken 10/4/2024 0136)  Verbalizes/displays adequate comfort level or baseline comfort level:   Encourage patient to monitor pain and request assistance   Assess pain using appropriate pain scale   Administer analgesics based on type and severity of pain and evaluate response     Problem: Safety - Adult  Goal: Free from fall injury  Outcome: Progressing  Flowsheets (Taken 10/4/2024 0136)  Free From Fall Injury: Instruct family/caregiver on patient safety  Note: Patient bed alarm is on at this time.     Problem: Skin/Tissue Integrity - Adult  Goal: Skin integrity remains intact  Outcome: Progressing  Flowsheets (Taken 10/4/2024 0136)  Skin Integrity Remains Intact: Monitor for areas of redness and/or skin breakdown     Problem: Musculoskeletal - Adult  Goal: Return mobility to safest level of function  Outcome: Progressing  Flowsheets (Taken 10/4/2024 0136)  Return Mobility to Safest Level of Function:   Assess patient stability and activity tolerance for standing, transferring and ambulating with or without assistive devices   Assist with transfers and ambulation using safe patient handling equipment as needed     Problem: Gastrointestinal - Adult  Goal: Minimal or absence of nausea and vomiting  Outcome: Progressing  Flowsheets (Taken 10/4/2024 0136)  Minimal or absence of nausea and vomiting:   Administer IV fluids as ordered to ensure adequate hydration   Maintain NPO status until nausea and vomiting are resolved   Administer ordered antiemetic medications as needed  Goal: Maintains or returns to baseline bowel function  Outcome: Progressing  Flowsheets (Taken 10/4/2024 0136)  Maintains or returns to baseline bowel function:   Assess bowel function   Encourage oral fluids to ensure adequate hydration  Goal: Maintains adequate nutritional intake  Outcome: Progressing  Flowsheets (Taken 
  Problem: Pain  Goal: Verbalizes/displays adequate comfort level or baseline comfort level  Outcome: Progressing  Flowsheets (Taken 10/6/2024 2156)  Verbalizes/displays adequate comfort level or baseline comfort level:   Encourage patient to monitor pain and request assistance   Assess pain using appropriate pain scale   Administer analgesics based on type and severity of pain and evaluate response   Implement non-pharmacological measures as appropriate and evaluate response   Notify Licensed Independent Practitioner if interventions unsuccessful or patient reports new pain     Problem: Safety - Adult  Goal: Free from fall injury  Outcome: Progressing  Flowsheets (Taken 10/6/2024 2156)  Free From Fall Injury: Instruct family/caregiver on patient safety     Problem: Skin/Tissue Integrity - Adult  Goal: Skin integrity remains intact  Outcome: Progressing  Flowsheets (Taken 10/6/2024 2156)  Skin Integrity Remains Intact: Monitor for areas of redness and/or skin breakdown     Problem: Musculoskeletal - Adult  Goal: Return mobility to safest level of function  Outcome: Progressing  Flowsheets (Taken 10/6/2024 2156)  Return Mobility to Safest Level of Function: Assess patient stability and activity tolerance for standing, transferring and ambulating with or without assistive devices     Problem: Gastrointestinal - Adult  Goal: Minimal or absence of nausea and vomiting  Outcome: Progressing  Flowsheets (Taken 10/6/2024 2156)  Minimal or absence of nausea and vomiting:   Administer IV fluids as ordered to ensure adequate hydration   Administer ordered antiemetic medications as needed  Goal: Maintains or returns to baseline bowel function  Outcome: Progressing  Flowsheets (Taken 10/6/2024 2156)  Maintains or returns to baseline bowel function:   Assess bowel function   Encourage oral fluids to ensure adequate hydration   Encourage mobilization and activity  Goal: Maintains adequate nutritional intake  Outcome: 
  Problem: Pain  Goal: Verbalizes/displays adequate comfort level or baseline comfort level  Outcome: Progressing  Flowsheets (Taken 9/30/2024 2118)  Verbalizes/displays adequate comfort level or baseline comfort level:   Encourage patient to monitor pain and request assistance   Assess pain using appropriate pain scale   Administer analgesics based on type and severity of pain and evaluate response   Implement non-pharmacological measures as appropriate and evaluate response   Consider cultural and social influences on pain and pain management   Notify Licensed Independent Practitioner if interventions unsuccessful or patient reports new pain     Problem: Safety - Adult  Goal: Free from fall injury  Outcome: Progressing  Flowsheets (Taken 9/30/2024 2118)  Free From Fall Injury: Instruct family/caregiver on patient safety     Problem: Skin/Tissue Integrity - Adult  Goal: Skin integrity remains intact  Outcome: Progressing  Flowsheets (Taken 9/29/2024 2006 by Debbie Kumar RN)  Skin Integrity Remains Intact: Monitor for areas of redness and/or skin breakdown     Problem: Musculoskeletal - Adult  Goal: Return mobility to safest level of function  Outcome: Progressing  Flowsheets (Taken 9/30/2024 2118)  Return Mobility to Safest Level of Function:   Assess patient stability and activity tolerance for standing, transferring and ambulating with or without assistive devices   Assist with transfers and ambulation using safe patient handling equipment as needed   Ensure adequate protection for wounds/incisions during mobilization   Obtain physical therapy/occupational therapy consults as needed   Apply continuous passive motion per provider or physical therapy orders to increase flexion toward goal   Instruct patient/family in ordered activity level     Problem: Gastrointestinal - Adult  Goal: Minimal or absence of nausea and vomiting  Outcome: Progressing  Flowsheets (Taken 9/30/2024 2118)  Minimal or absence of nausea and 
Catrachita Noonan PA-C  [address] [address]     10/8/2024     [facility]        Re: Letter of Medical Necessity for Total Parenteral Nutrition (TPN)  Patient: Ana Puente  YOB: 1985  Policy Number: [id number]      Dear Medical Review Department,     I am writing on behalf of my patient, Ana Puente, to request approval for coverage of Total Parenteral Nutrition (TPN) due to her complex medical condition. Ana Puente has a confirmed diagnosis of celiac disease, intestinal malabsorption, and severe protein-calorie malnutrition, as evidenced by moderate fat loss, including orbital and buccal fat loss, along with muscle mass loss of temporalis, pectoralis/deltoids, and quadriceps. She has also had weigh loss of 8.4% of her body weight within the last 1.5 to 2 months.      Despite various interventions and extensive workup, including a trial of enteral tube feedings, Ana Puente was unable to tolerate these measures due to significant gastrointestinal symptoms, including increased abdominal pain, diarrhea, and fecal incontinence. Her condition has been thoroughly evaluated, and at this time, she does not have a reversible gastrointestinal condition that would allow for the safe or adequate tolerance of oral or enteral feedings.     Without the provision of TPN, Ana Puente faces a substantial risk of further malnutrition and weight loss, which could lead to life-threatening complications. Her nutritional status is critical, and TPN is necessary to provide adequate caloric intake, prevent further deterioration, and support her recovery.     Given her current medical condition and intolerance to other nutritional methods, TPN is the most appropriate and essential intervention to ensure her health and prevent further decline. I strongly urge you to consider this request for coverage of TPN, as it is a medically necessary treatment for 
bowel function  10/2/2024 0928 by Sonja Alonzo RN  Outcome: Progressing  Flowsheets (Taken 10/2/2024 0928)  Maintains or returns to baseline bowel function:   Assess bowel function   Encourage oral fluids to ensure adequate hydration   Administer IV fluids as ordered to ensure adequate hydration     Problem: Gastrointestinal - Adult  Goal: Maintains adequate nutritional intake  10/2/2024 0928 by Sonja Alonzo RN  Outcome: Progressing  Flowsheets (Taken 10/2/2024 0928)  Maintains adequate nutritional intake:   Monitor percentage of each meal consumed   Obtain nutritional consult as needed   Identify factors contributing to decreased intake, treat as appropriate     Problem: Infection - Adult  Goal: Absence of infection at discharge  10/2/2024 0928 by Sonja Alonzo RN  Outcome: Progressing  Flowsheets (Taken 10/2/2024 0928)  Absence of infection at discharge:   Assess and monitor for signs and symptoms of infection   Monitor lab/diagnostic results   Monitor all insertion sites i.e., indwelling lines, tubes and drains     Problem: Infection - Adult  Goal: Absence of infection during hospitalization  10/2/2024 0928 by Sonja Alonzo RN  Outcome: Progressing  Flowsheets (Taken 10/2/2024 0928)  Absence of infection during hospitalization:   Assess and monitor for signs and symptoms of infection   Monitor lab/diagnostic results   Monitor all insertion sites i.e., indwelling lines, tubes and drains     Problem: Discharge Planning  Goal: Discharge to home or other facility with appropriate resources  10/2/2024 0928 by Sonja Alonzo RN  Outcome: Progressing  Flowsheets (Taken 10/2/2024 0928)  Discharge to home or other facility with appropriate resources:   Identify barriers to discharge with patient and caregiver   Arrange for needed discharge resources and transportation as appropriate   Identify discharge learning needs (meds, wound care, etc)     Problem: Skin/Tissue Integrity  Goal: Absence of new skin 
Recommend appropriate diets, oral nutritional supplements, and vitamin/mineral supplements   Recommend, monitor, and adjust tube feedings and TPN/PPN based on assessed needs   Care plan reviewed with patient.  Patient verbalizes understanding of the plan of care and contributes to goal setting.    
Optimize nutritional status  10/1/2024 2231 by Lakshmi Fitzgerald, RN  Outcome: Progressing  Flowsheets (Taken 10/1/2024 1131 by Sonja Alonzo RN)  Nutrient intake appropriate for improving, restoring, or maintaining nutritional needs:   Assess nutritional status and recommend course of action   Recommend appropriate diets, oral nutritional supplements, and vitamin/mineral supplements   Recommend, monitor, and adjust tube feedings and TPN/PPN based on assessed needs  10/1/2024 1131 by Sonja Alonzo RN  Outcome: Progressing  Flowsheets  Taken 10/1/2024 1131 by Sonja Alonzo RN  Nutrient intake appropriate for improving, restoring, or maintaining nutritional needs:   Assess nutritional status and recommend course of action   Recommend appropriate diets, oral nutritional supplements, and vitamin/mineral supplements   Recommend, monitor, and adjust tube feedings and TPN/PPN based on assessed needs  Taken 10/1/2024 0936 by Ana Murphy, MS, RD, LD  Nutrient intake appropriate for improving, restoring, or maintaining nutritional needs:   Assess nutritional status and recommend course of action   Monitor oral intake, labs, and treatment plans   Recommend appropriate diets, oral nutritional supplements, and vitamin/mineral supplements   Recommend, monitor, and adjust tube feedings and TPN/PPN based on assessed needs     Care plan reviewed with patient.  Patient verbalized understanding of the plan of care and contribute to goal setting.

## 2024-10-09 NOTE — CARE COORDINATION
10/9/24, 3:26 PM EDT    Patient goals/plan/ treatment preferences discussed by  and .  Patient goals/plan/ treatment preferences reviewed with patient/ family.  Patient/ family verbalize understanding of discharge plan and are in agreement with goal/plan/treatment preferences.  Understanding was demonstrated using the teach back method.  AVS provided by RN at time of discharge, which includes all necessary medical information pertaining to the patients current course of illness, treatment, post-discharge goals of care, and treatment preferences.     Services At/After Discharge: Home Health, IV Therapy, Nursing service, OT, and PT    TPN from [employer]  [phone]    Home Health by [facility] Care by [facility]

## 2024-10-09 NOTE — PROGRESS NOTES
Pharmacy Consult for TPN/PPN Monitoring & Adjustment  IV Access: central      Dosing weight: 45 kg  Current insulin regimen: low sliding scale  Diet (excluding TPN): Clear liquid diet  Maintenance fluid: 0.9% sodium chloride at 125 mL/hr    Plan:  See components of tonight's TPN bag in the table below:  Date 10/5/24 10/6/24 10/7/24 10/8/24 10/9/24   Total kcal/kg 25 25 30 30 30   Total kcal 1125 1125 1350 1350 1350   Protein g/kg 2 2 2 2 2   Protein g 90 90 90 90 90   Protein kcal (4 kcal/g) 360 360 360 360 360   Lipid % 30 30 30 30 30   Lipid g 33.8 33.8 40.5 40.5 40.5   Lipid kcal (10 kcal/g) 338 338 405 405 405   Dextrose g 126 126 172 172 172   Dextrose kcal (3.4 kcal/g) 427 427 585 585 585   Infusion Rate (mL/hr) 70 70 70 70 70   Na Acetate (mEq) 60 60 60 60 80   Na Chloride (mEq) 100 100 100 100 100   Na Phos (mmol)  (3 mmol = 4 mEq Na) 0 0 0 0 0   Total Na (mEq) 160 160 160 160 180   K Acetate (mEq) 30 30 30 30 30   K Chloride (mEq) 30 30 30 30 30   K Phos (mmol)  (15 mmol = 22 mEq K) 0 0 0 0 0   Total K (mEq) 60 60 60 60 60   Ca Gluconate (mEq)  (1 g = 4.65 mEq) 0 0 0 0 0   Mag Sulfate (mEq)  (1 g = 8.12 mEq) 12.18 12.18 12.18 12.18 12.18   Multivitamin (mL) 0 0 10 0 10   Trace Elements (mL) 1 1 1 1 1       Summary of changes for tonight's TPN:   No macronutrient changes per dietary  Add 10 ml multivitamin (Shortage-only MWF)  Increase sodium acetate to 80 mEq    Electrolyte Replacement:   none    Monitoring:  BMP, Mag, iCal, & Phos ordered for tomorrow with AM labs.    Pharmacy will continue to follow daily. Thank you for the consult.  Lizbet Harrington, PharmFABIO 10/9/2024 10:32 AM  
    Hospitalist Progress Note      Patient:  Ana Puente 39 y.o. female     Unit/Bed:-03/003-A    Date of Admission: 9/29/2024      ASSESSMENT AND PLAN    Active Problems  Intractable abdominal pain with severe protein calorie malnutrition:   Etiology unclear.  Has undergone extensive workup locally and at [facility].  History of celiac disease and superior mesenteric artery syndrome  Has not been tolerating oral intake for last few months with accompanying weight loss.  Gastroenterology consulted, Dr. Dahl following- has signed off- follow up outpatient with specialists at [facility]  Oxycodone ordered for analgesia- decrease to q6 hours and trial alternating tylenol in between   Zofran and phenergan for nausea  Trialed Dobhoff placement with tube feedings- patient unable to tolerate due to nausea and diarrhea . TPN was resumed- previously was attempted but not covered by insurance.  Case management working on supplies currently.   To goal on TPN- pharmacy and dietician following.   Currently on Creon, Protonix, Carafate.  Previously trialed on rifaximin for possible SIBO at [facility] and [facility], but patient denies significant symptomatic improvement with these.        Chronic Conditions (reviewed and stable unless otherwise stated)  History of stroke: ASA & Pavix   Celiac Disease   Anxiety & Depression   Marijuana Use       LDA: []CVC / [x]PICC / []Midline / []Dawn / []Drains / []Mediport / []None  Antibiotics: None   Steroids: None   Labs (still needed?): [x]Yes / []No  IVF (still needed?): [x]Yes / []No    Level of care: []Step Down / [x]Med-Surg  Bed Status: [x]Inpatient / []Observation  Telemetry: []Yes / [x]No  PT/OT: []Yes / [x]No    DVT Prophylaxis: [x] Lovenox / [] Heparin / [] SCDs / [] Already on Systemic Anticoagulation / [] None   Code status: Full Code     Expected discharge date:  10/7/2024  Disposition: Home  
    Hospitalist Progress Note      Patient:  Ana Puente 39 y.o. female     Unit/Bed:-03/003-A    Date of Admission: 9/29/2024      ASSESSMENT AND PLAN    Active Problems  Intractable abdominal pain:   Etiology unclear.  Has undergone extensive workup locally and at [facility].  History of celiac disease and superior mesenteric artery syndrome  Has not been tolerating oral intake for last few months with accompanying weight loss.  Gastroenterology consulted, Dr. Dahl following- has signed off- follow up outpatient with specialists at [facility]  Dilaudid stopped today in anticipation of discharge tomorrow.   Oxycodone ordered for analgesia  Zofran and phenergan for nausea  Trialed Dobhoff placement with tube feedings- patient unable to tolerate due to nausea and diarrhea . TPN was resumed- previously was attempted but not covered by insurance.  Case management working on supplies currently.   To goal on TPN- pharmacy and dietician following.   Currently on Creon, Protonix, Carafate.  Previously trialed on rifaximin for possible SIBO at [facility] and [facility], but patient denies significant symptomatic improvement with these.      Chronic Conditions (reviewed and stable unless otherwise stated)  History of stroke: ASA & Pavix   Celiac Disease   Anxiety & Depression   Marijuana Use       LDA: []CVC / [x]PICC / []Midline / []Dawn / []Drains / []Mediport / []None  Antibiotics: None   Steroids: None   Labs (still needed?): [x]Yes / []No  IVF (still needed?): [x]Yes / []No    Level of care: []Step Down / [x]Med-Surg  Bed Status: [x]Inpatient / []Observation  Telemetry: []Yes / [x]No  PT/OT: []Yes / [x]No    DVT Prophylaxis: [x] Lovenox / [] Heparin / [] SCDs / [] Already on Systemic Anticoagulation / [] None   Code status: Full Code     Expected discharge date:  10/7/2024  Disposition: Home      ===================================================================    Chief Complaint: Abdominal pain, 
    Hospitalist Progress Note      Patient:  Ana Puente 39 y.o. female     Unit/Bed:-03/003-A    Date of Admission: 9/29/2024      ASSESSMENT AND PLAN    Active Problems  Intractable abdominal pain: Etiology unclear.  Has undergone extensive workup locally and at [facility].  History of celiac disease and superior mesenteric artery syndrome  Has not been tolerating oral intake for last few months. Endorses weight loss.  Gastroenterology consulted, Dr. Dahl following  Dilaudid ordered for pain, wean IV narcotics as able. Oxycodone ordered for analgesia, wean IV narcotics as able.  Oral phenergan ordered for nausea in addition to Zofran  Pharmacy and dietitian consulted for TPN management, patient with previous issue of insurance covering TPN, left [facility] AMA because they could not get TPN approved. GI recommending TPN due to severe calorie malnutrition. Per , she discussed with home infusion company and was told this would not be covered. GI recommending Dobhoff feeding tube placement. IR will place Dobhoff today. Pharmacy and dietician to manage tube feeds in the meantime.   Currently on Creon, Protonix, Carafate.  Previously trialed on rifaximin for possible SIBO at [facility] and [facility], but patient denies significant symptomatic improvement with these.    Resolved Problems  Hyperglycemia     Chronic Conditions (reviewed and stable unless otherwise stated)  History of stroke: ASA & Pavix   Celiac Disease   Anxiety & Depression   Marijuana Use       LDA: []CVC / []PICC / []Midline / []Dawn / []Drains / []Mediport / [x]None  Antibiotics: None   Steroids: None   Labs (still needed?): [x]Yes / []No  IVF (still needed?): [x]Yes / []No    Level of care: []Step Down / [x]Med-Surg  Bed Status: [x]Inpatient / []Observation  Telemetry: []Yes / [x]No  PT/OT: []Yes / [x]No    DVT Prophylaxis: [x] Lovenox / [] Heparin / [] SCDs / [] Already on Systemic Anticoagulation / [] None   Code status: Full 
    Hospitalist Progress Note      Patient:  Ana Puente 39 y.o. female     Unit/Bed:-03/003-A    Date of Admission: 9/29/2024      ASSESSMENT AND PLAN    Active Problems  Intractable abdominal pain: Etiology unclear.  Has undergone extensive workup locally and at [facility].  History of celiac disease and superior mesenteric artery syndrome  Has not been tolerating oral intake for last few months. Endorses weight loss.  Gastroenterology consulted, Dr. Dahl following  Dilaudid ordered for pain, wean IV narcotics as able. Oxycodone ordered for analgesia, wean IV narcotics as able.  Oral phenergan ordered for nausea in addition to Zofran  Pharmacy and dietitian consulted for TPN management, patient with previous issue of insurance covering TPN, left [facility] AMA because they could not get TPN approved. GI recommending TPN due to severe calorie malnutrition. Per , she discussed with home infusion company and was told this would not be covered. Dobhoff in place now with tube feeding running. Pharmacy and dietician to manage tube feeds in the meantime.   Currently on Creon, Protonix, Carafate.  Previously trialed on rifaximin for possible SIBO at [facility] and [facility], but patient denies significant symptomatic improvement with these.      Dispo: still having multiple watery diarrhea bowel movements with NGT in place getting tube feeds. Appreciate GI recs       Chronic Conditions (reviewed and stable unless otherwise stated)  History of stroke: ASA & Pavix   Celiac Disease   Anxiety & Depression   Marijuana Use       LDA: []CVC / []PICC / []Midline / []Dawn / []Drains / []Mediport / [x]None  Antibiotics: None   Steroids: None   Labs (still needed?): [x]Yes / []No  IVF (still needed?): [x]Yes / []No    Level of care: []Step Down / [x]Med-Surg  Bed Status: [x]Inpatient / []Observation  Telemetry: []Yes / [x]No  PT/OT: []Yes / [x]No    DVT Prophylaxis: [x] Lovenox / [] Heparin / [] SCDs / [] 
    Hospitalist Progress Note    Patient:  Ana Puente      Unit/Bed:5K-03/003-A    YOB: 1985    MRN: [id number]       Acct: [id number]     PCP: Usha Amaya MD    Date of Admission: 9/29/2024    Assessment/Plan:    Intractable abdominal pain: Etiology unclear.  Has undergone extensive workup locally and at [facility].  History of celiac disease and superior mesenteric artery syndrome  Has not been tolerating oral intake for last few months. Endorses weight loss.  Gastroenterology consulted in emergency department  Consider TPN  Dilaudid ordered for pain, wean IV narcotics as able  Oral phenergan ordered for nausea  Pharmacy and dietitian consulted for TPN management, patient with previous issue with insurance covering TPN per   Currently on Creon, Protonix, Carafate.  Previously trialed on rifaximin for possible SIBO at [facility] and [facility], but patient denies significant symptomatic improvement with these.    Hyperglycemia:  Patient reportedly denies history of diabetes.  States she is only hyperglycemic while on TPN.  Accu-Cheks and low-dose sliding scale ordered.    History of stroke:  No etiology identified.  Continue Plavix, Lipitor    Celiac disease:  Gluten-free diet.    Unspecified anxiety/depression:  Continue Wellbutrin    Marijuana use:  Cessation encouraged    Disposition:    [] Home       [] TCU       [] Rehab       [] Psych       [] SNF       [] Long Term Care Facility       [] Other-    Chief Complaint: Abdominal pain, unable to tolerate oral intake     Subjective: 39 y.o. female admitted to the hospitalist service for abdominal pain and poor tolerance of oral intake.  Patient endorses nausea today.  She reports feeling \"shitty.\"  She endorses shortness of breath.  She denies bowel movement today.  Patient asks about TPN being started.  GI to see the patient.    Medications:    Infusion Medications    sodium chloride      dextrose       Scheduled 
    Hospitalist Progress Note    Patient:  Ana Puente      Unit/Bed:5K-03/003-A    YOB: 1985    MRN: [id number]       Acct: [id number]     PCP: Usha Amaya MD    Date of Admission: 9/29/2024    Assessment/Plan:    Intractable abdominal pain: Etiology unclear.  Has undergone extensive workup locally and at [facility].  History of celiac disease and superior mesenteric artery syndrome  Has not been tolerating oral intake for last few months. Endorses weight loss.  Gastroenterology consulted, Dr. Dahl following  Dilaudid ordered for pain, wean IV narcotics as able. Oxycodone ordered for analgesia, wean IV narcotics as able.  Oral phenergan ordered for nausea in addition to Zofran  Pharmacy and dietitian consulted for TPN management, patient with previous issue of insurance covering TPN, left [facility] AMA because they could not get TPN approved. GI recommending TPN due to severe calorie malnutrition. Per , she discussed with home infusion company and was told this would not be covered. GI recommending Dobhoff feeding tube placement. IR to place this tomorrow per GI documentation. Pharmacy and dietician to manage tube feeds in the meantime.   Currently on Creon, Protonix, Carafate.  Previously trialed on rifaximin for possible SIBO at [facility] and [facility], but patient denies significant symptomatic improvement with these.    Hyperglycemia:  Patient reportedly denies history of diabetes.  States she is only hyperglycemic while on TPN.  Accu-Cheks and low-dose sliding scale ordered.  Latest POCT glucose 109    Hypotension:  IVF ordered  Monitor, avoid IV analgesics if SBP < 100    History of stroke:  No etiology identified.  Continue Plavix, Lipitor    Celiac disease:  Gluten-free diet.    Unspecified anxiety/depression:  Continue Wellbutrin    Marijuana use:  Cessation encouraged    Disposition:    [] Home       [] TCU       [] Rehab       [] Psych       [] SNF       [] Long 
    Pharmacist Review and Automatic Dose Adjustment of Prophylactic Enoxaparin    Reviewed reason(s) for admission/hospital problem list    The reviewing pharmacist has made an adjustment to the ordered enoxaparin dose or converted to UFH per the approved [facility] protocol and table as identified below.        Ana Puente is a 39 y.o. female.     Recent Labs     09/29/24  1335 09/30/24  0640   CREATININE 0.7 0.7       Estimated Creatinine Clearance: 75 mL/min (based on SCr of 0.7 mg/dL).    Recent Labs     09/29/24  1335 09/30/24  0640   HGB 12.0 10.5*   HCT 35.3* 31.3*    233     No results for input(s): \"INR\" in the last 72 hours.    Height:   Ht Readings from Last 1 Encounters:   09/29/24 1.524 m (5')     Weight:  Wt Readings from Last 1 Encounters:   09/30/24 44.7 kg (98 lb 9.6 oz)               Plan: Based upon the patient's weight and renal function    Ordered: Enoxaparin 40mg SUBQ Daily    Changed/converted to    New Order: Enoxaparin 30mg SUBQ Daily      Thank you,  Lizbet Harrington, [facility]  9/30/2024, 2:32 PM   
    Pharmacist Review and Automatic Dose Adjustment of Prophylactic Enoxaparin or Heparin    Reviewed reason for admission/hospital problem list    The reviewing pharmacist has made an adjustment to the ordered enoxaparin or heparin dose or converted to heparin per the approved [facility] protocol and table as identified below.      Recent Labs     10/06/24  0625 10/07/24  0540 10/08/24  0525   CREATININE 0.5 0.5 0.5     Estimated Creatinine Clearance: 107 mL/min (based on SCr of 0.5 mg/dL).    No results for input(s): \"HGB\", \"HCT\", \"PLT\" in the last 72 hours.  No results for input(s): \"INR\" in the last 72 hours.    Height:   Ht Readings from Last 1 Encounters:   09/29/24 1.524 m (5')     Weight:  Wt Readings from Last 1 Encounters:   10/08/24 48.6 kg (107 lb 3.2 oz)       *Do not exceed enoxaparin 40mg daily or UFH 5000 units SUBQ TID in patients with epidurals,  lumbar drains, or external ventricular drains.    Plan:   Changed enoxaparin 40 mg subq daily to enoxaparin 30 mg subq daily.     Thank you,  Lizbet Harrington, PharmD 10/8/2024 11:03 AM      
    Pharmacist Review and Automatic Dose Adjustment of Prophylactic Enoxaparin or Heparin    Reviewed reason for admission/hospital problem list    The reviewing pharmacist has made an adjustment to the ordered enoxaparin or heparin dose or converted to heparin per the approved [facility] protocol and table as identified below.      Recent Labs     10/02/24  0630 10/03/24  0630 10/04/24  0512   CREATININE 0.5 0.5 0.5     Estimated Creatinine Clearance: 107 mL/min (based on SCr of 0.5 mg/dL).    No results for input(s): \"HGB\", \"HCT\", \"PLT\" in the last 72 hours.  No results for input(s): \"INR\" in the last 72 hours.    Height:   Ht Readings from Last 1 Encounters:   09/29/24 1.524 m (5')     Weight:  Wt Readings from Last 1 Encounters:   10/04/24 52.9 kg (116 lb 9.6 oz)       *Do not exceed enoxaparin 40mg daily or UFH 5000 units SUBQ TID in patients with epidurals,  lumbar drains, or external ventricular drains.    Plan:   Changed enoxaparin 30 mg subq daily to enoxaparin 40 mg subq daily.     Thank you,  Lizbet Harrington, PharmD 10/4/2024 10:42 AM      
    Pharmacy Consult for TPN/PPN Monitoring & Adjustment  IV Access: central      Dosing weight: 45 kg  Current insulin regimen: low sliding scale  Diet (excluding TPN): Clear liquid diet  Maintenance fluid: 0.9% sodium chloride at 125 mL/hr    Plan:  See components of tonight's TPN bag in the table below:  Date 09/30/24 10/1/24 10/2/24 10/3/24   Total kcal/kg 15 20 25 25   Total kcal  1125   Protein g/kg 2 2 2 2   Protein g 90 90 90 90   Protein kcal (4 kcal/g) 360 360 360 360   Lipid % 30 30 30 30   Lipid g 20.3 27 33.8 33.8   Lipid kcal (10 kcal/g) 203 270 338 338   Dextrose g 33 79 126 126   Dextrose kcal (3.4 kcal/g) 112 270 427 427   Infusion Rate (mL/hr) 70 70 70 70   Na Acetate (mEq) 50 50 50 50   Na Chloride (mEq) 100 100 100 100   Na Phos (mmol)  (3 mmol = 4 mEq Na) 0 0 0 0   Total Na (mEq) 150 150 150 150   K Acetate (mEq) 30 30 30 30   K Chloride (mEq) 40 40 40 40   K Phos (mmol)  (15 mmol = 22 mEq K) 8 8 12 12   Total K (mEq) 81.73 81.73 87.6 87.6   Ca Gluconate (mEq)  (1 g = 4.65 mEq) 0 0 2.325 0   Mag Sulfate (mEq)  (1 g = 8.12 mEq) 8.12 8.12 8.12 8.12   Multivitamin (mL) 10 10 10 0   Trace Elements (mL) 1 1 1 1     Summary of changes for tonight's TPN:   No macronutrient changes per dietary (spoke with dietician)  Remove MVI (due to shortage- only in MWF bags)  Remove Calcium gluconate     Electrolyte Replacement:   None    Monitoring:  BMP, Mag, iCal, & Phos ordered for tomorrow with AM labs.    Pharmacy will continue to follow daily. Thank you for the consult.    Dandre Fitzgerald, PharmD, BCPS  10/3/2024  12:14 PM      
    Pharmacy Consult for TPN/PPN Monitoring & Adjustment  IV Access: central      Dosing weight: 45 kg  Current insulin regimen: low sliding scale  Diet (excluding TPN): Clear liquid diet  Maintenance fluid: 0.9% sodium chloride at 125 mL/hr    Plan:  See components of tonight's TPN bag in the table below:  Date 09/30/24 10/1/24 10/2/24 10/3/24 10/4/24   Total kcal/kg 15 20 25 25 25   Total kcal  1125 1125   Protein g/kg 2 2 2 2 2   Protein g 90 90 90 90 90   Protein kcal (4 kcal/g) 360 360 360 360 360   Lipid % 30 30 30 30 30   Lipid g 20.3 27 33.8 33.8 33.8   Lipid kcal (10 kcal/g) 203 270 338 338 338   Dextrose g 33 79 126 126 126   Dextrose kcal (3.4 kcal/g) 112 270 427 427 427   Infusion Rate (mL/hr) 70 70 70 70 70   Na Acetate (mEq) 50 50 50 50 60   Na Chloride (mEq) 100 100 100 100 100   Na Phos (mmol)  (3 mmol = 4 mEq Na) 0 0 0 0 0   Total Na (mEq) 150 150 150 150 160   K Acetate (mEq) 30 30 30 30 30   K Chloride (mEq) 40 40 40 40 50   K Phos (mmol)  (15 mmol = 22 mEq K) 8 8 12 12 6   Total K (mEq) 81.73 81.73 87.6 87.6 88.8   Ca Gluconate (mEq)  (1 g = 4.65 mEq) 0 0 2.325 0 0   Mag Sulfate (mEq)  (1 g = 8.12 mEq) 8.12 8.12 8.12 8.12 12.18   Multivitamin (mL) 10 10 10 0 10   Trace Elements (mL) 1 1 1 1 1     Summary of changes for tonight's TPN:   No macro changes per dietary (spoke with dietician on the phone)  Increase Sodium Acetate to 60 mEq  Increase Potassium Chloride to 50 mEq  Decrease Potassium Phosphate to 6 mmol  Increase Magnesium Sulfate to 12.18 mEq (1.5g)  Add MVI 10 ml (due to shortage- only in MWF bags)    Electrolyte Replacement:   Magnesium 2g       Monitoring:  BMP, Mag, iCal, & Phos ordered for tomorrow with AM labs.    Pharmacy will continue to follow daily. Thank you for the consult.  Lizbet Harrington, PharmD 10/4/2024 7:15 AM  
    Pharmacy Consult for TPN/PPN Monitoring & Adjustment  IV Access: central      Dosing weight: 45 kg  Current insulin regimen: low sliding scale  Diet (excluding TPN): Clear liquid diet  Maintenance fluid: none    Plan:  See components of tonight's TPN bag in the table below:  Date 09/30/24 10/1/24 10/2/24   Total kcal/kg 15 20 25   Total kcal    Protein g/kg 2 2 2   Protein g 90 90 90   Protein kcal (4 kcal/g) 360 360 360   Lipid % 30 30 30   Lipid g 20.3 27 33.8   Lipid kcal (10 kcal/g) 203 270 338   Dextrose g 33 79 126   Dextrose kcal (3.4 kcal/g) 112 270 427   Infusion Rate (mL/hr) 70 70 70   Na Acetate (mEq) 50 50 50   Na Chloride (mEq) 100 100 100   Na Phos (mmol)  (3 mmol = 4 mEq Na) 0 0 0   Total Na (mEq) 150 150 150   K Acetate (mEq) 30 30 30   K Chloride (mEq) 40 40 40   K Phos (mmol)  (15 mmol = 22 mEq K) 8 8 12   Total K (mEq) 81.73 81.73 87.6   Ca Gluconate (mEq)  (1 g = 4.65 mEq) 0 0 2.325   Mag Sulfate (mEq)  (1 g = 8.12 mEq) 8.12 8.12 8.12   Multivitamin (mL) 10 10 10   Trace Elements (mL) 1 1 1       Summary of changes for tonight's TPN:   Macronutrient changes per dietary above  Add calcium gluconate 0.5g (2.325 mEq)  Increase potassium phosphate to 12 mmol    Electrolyte Replacement:   none    Monitoring:  BMP, Mag, iCal, & Phos ordered for tomorrow with AM labs.    Pharmacy will continue to follow daily. Thank you for the consult.  Lizbet Harrington, PharmD 10/2/2024 12:20 PM  
    Pharmacy Consult for TPN/PPN Monitoring & Adjustment  IV Access: central      Dosing weight: 45 kg  Current insulin regimen: low sliding scale  Diet (excluding TPN): Tube Feeds  Maintenance fluid: 0.9% sodium chloride at 125 mL/hr    Plan:  See components of tonight's TPN bag in the table below:  Date 10/4/24 10/5/24 10/6/24   Total kcal/kg 25 25 25   Total kcal 1125 1125 1125   Protein g/kg 2 2 2   Protein g 90 90 90   Protein kcal (4 kcal/g) 360 360 360   Lipid % 30 30 30   Lipid g 33.8 33.8 33.8   Lipid kcal (10 kcal/g) 338 338 338   Dextrose g 126 126 126   Dextrose kcal (3.4 kcal/g) 427 427 427   Infusion Rate (mL/hr) 70 70 70   Na Acetate (mEq) 60 60 60   Na Chloride (mEq) 100 100 100   Na Phos (mmol)  (3 mmol = 4 mEq Na) 0 0 0   Total Na (mEq) 160 160 160   K Acetate (mEq) 30 30 30   K Chloride (mEq) 50 30 30   K Phos (mmol)  (15 mmol = 22 mEq K) 6 0 0   Total K (mEq) 88.8 60 60   Ca Gluconate (mEq)  (1 g = 4.65 mEq) 0 0 0   Mag Sulfate (mEq)  (1 g = 8.12 mEq) 12.18 12.18 12.18   Multivitamin (mL) 10 0 0   Trace Elements (mL) 1 1 1     Summary of changes for tonight's TPN:   No changes    Electrolyte Replacement:   None    Monitoring:  BMP, Mag, iCal, & Phos ordered for tomorrow with AM labs.    Pharmacy will continue to follow daily. Thank you for the consult.  Cheli Arizmendi [facility] BCPS  10/6/2024 8:07 AM      
    Pharmacy Consult for TPN/PPN Monitoring & Adjustment  IV Access: central      Dosing weight: 45 kg  Current insulin regimen: low sliding scale  Diet (excluding TPN): Tube Feeds (increasing as tolerated to 75ml/hr)  Maintenance fluid: 0.9% sodium chloride at 125 mL/hr    Plan:  See components of tonight's TPN bag in the table below:  Date 10/3/24 10/4/24 10/5/24   Total kcal/kg 25 25 25   Total kcal 1125 1125 1125   Protein g/kg 2 2 2   Protein g 90 90 90   Protein kcal (4 kcal/g) 360 360 360   Lipid % 30 30 30   Lipid g 33.8 33.8 33.8   Lipid kcal (10 kcal/g) 338 338 338   Dextrose g 126 126 126   Dextrose kcal (3.4 kcal/g) 427 427 427   Infusion Rate (mL/hr) 70 70 70   Na Acetate (mEq) 50 60 60   Na Chloride (mEq) 100 100 100   Na Phos (mmol)  (3 mmol = 4 mEq Na) 0 0 0   Total Na (mEq) 150 160 160   K Acetate (mEq) 30 30 30   K Chloride (mEq) 40 50 30   K Phos (mmol)  (15 mmol = 22 mEq K) 12 6 0   Total K (mEq) 87.6 88.8 60   Ca Gluconate (mEq)  (1 g = 4.65 mEq) 0 0 0   Mag Sulfate (mEq)  (1 g = 8.12 mEq) 8.12 12.18 12.18   Multivitamin (mL) 0 10 0   Trace Elements (mL) 1 1 1     Summary of changes for tonight's TPN:   Decrease KCl to 30mEq  Remove KPhos  Remove MVI (due to shortage, [facility] only)    Electrolyte Replacement:   None    Monitoring:  BMP, Mag, iCal, & Phos ordered for tomorrow with AM labs.    Due to amount of potassium and phos in tube feeds (Vital 1.2), will decrease and remove phos in TPN  Pharmacy will continue to follow daily. Thank you for the consult.  Cheli Arizmendi [facility]  10/5/2024 10:15 AM      
  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  She continues to have a nausea vomit as well as discomfort extensive multilevel so far been negative she has protein calorie malnutrition's recommend TPN pharmacy to dose outpatient pharmacy to order I will be willing to sign the orders this by the nutrition team at the pharmacist as an outpatient.  Diet: ADULT DIET; Clear Liquid; Gluten Free  PN-Adult  3 IN 1 Central Line (Custom)  PN-Adult  3 IN 1 Central Line (Custom)    Medications:   Scheduled Meds:    enoxaparin  30 mg SubCUTAneous Q24H    sodium chloride flush  5-40 mL IntraVENous 2 times per day    atorvastatin  40 mg Oral Nightly    buPROPion  300 mg Oral Every Other Day    clopidogrel  75 mg Oral Daily    insulin lispro  0-4 Units SubCUTAneous TID WC    insulin lispro  0-4 Units SubCUTAneous Nightly    pantoprazole  40 mg Oral QAM AC    valACYclovir  1,000 mg Oral Daily    sucralfate  1 g Oral 4x Daily AC & HS    lipase-protease-amylase  5,000 Units Oral TID WC     Continuous Infusions:    sodium chloride 125 mL/hr at 10/01/24 0953    PN-Adult  3 IN 1 Central Line (Custom)      PN-Adult  3 IN 1 Central Line (Custom) 70 mL/hr at 09/30/24 1846    sodium chloride      dextrose         CBC:   Recent Labs     09/29/24  1335 09/30/24  0640 10/01/24  0400   WBC 4.9 4.0* 6.1   HGB 12.0 10.5* 10.4*    233 208     BMP:    Recent Labs     09/29/24  1335 09/30/24  0640 10/01/24  0400    140 138   K 3.5 3.3* 4.5    106 107   CO2 20* 21* 24   BUN 6* 4* 7   CREATININE 0.7 0.7 0.6   GLUCOSE 176* 143* 91     Hepatic:   Recent Labs     09/29/24  1335   AST 16   ALT 19   BILITOT 0.4   ALKPHOS 66     INR: No results for input(s): \"INR\" in the last 72 hours.    Imaging:  Results for orders placed during the hospital encounter of 
  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  She continues to have a nausea vomit as well as discomfort extensive multilevel so far been negative she has protein calorie malnutrition's recommend TPN pharmacy to dose outpatient pharmacy to order I will be willing to sign the orders this by the nutrition team at the pharmacist as an outpatient.    10/2/2024 patient continued to have a bone discomfort as a TPN through the PICC line tolerating diet recommend to have TPN at home as patient with severe protein calorie malnutrition's.  Nausea vomit attempt to keep other than few bites of clear liquid diet at this time.  Continue abdominal comfort postprandials, history of severe mesenteric artery syndrome required surgery remotely in the past on long-term TPN.  Patient has significant weight loss lately.  She has a muscle weakness with significant weakness in the left side with CVA in the past her strength on left side is significantly weaker than expected which is exacerbated by my nutritions which make risk for recurrent CVA .  Has significant weight loss lately .  Her body fat being lost gradually apparent from her orbitals and facial features .  Her  is weak on the left side .  I recommend patient to stay on TPN on discharge .    10/3/2024 discussed the problem with the insurance of excepting TPN with the patient and her .  Patient had a bad experience the past with with diarrhea severe after each tube feeding could be because of celiac disease.  Patient willing to try tube feeding that is her to be careful that we give her gluten-free tube feeding if he does not tolerate that we will present the doctor to the insurance.  Also patient walked with her connections with her insurance and other hospital regarding TPN.    Diet: 
  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  She continues to have a nausea vomit as well as discomfort extensive multilevel so far been negative she has protein calorie malnutrition's recommend TPN pharmacy to dose outpatient pharmacy to order I will be willing to sign the orders this by the nutrition team at the pharmacist as an outpatient.    10/2/2024 patient continued to have a bone discomfort as a TPN through the PICC line tolerating diet recommend to have TPN at home as patient with severe protein calorie malnutrition's.  Nausea vomit attempt to keep other than few bites of clear liquid diet at this time.  Continue abdominal comfort postprandials, history of severe mesenteric artery syndrome required surgery remotely in the past on long-term TPN.  Patient has significant weight loss lately.  She has a muscle weakness with significant weakness in the left side with CVA in the past her strength on left side is significantly weaker than expected which is exacerbated by my nutritions which make risk for recurrent CVA .  Has significant weight loss lately .  Her body fat being lost gradually apparent from her orbitals and facial features .  Her  is weak on the left side .  I recommend patient to stay on TPN on discharge .    10/3/2024 discussed the problem with the insurance of excepting TPN with the patient and her .  Patient had a bad experience the past with with diarrhea severe after each tube feeding could be because of celiac disease.  Patient willing to try tube feeding that is her to be careful that we give her gluten-free tube feeding if he does not tolerate that we will present the doctor to the insurance.  Also patient walked with her connections with her insurance and other hospital regarding 
  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  She continues to have a nausea vomit as well as discomfort extensive multilevel so far been negative she has protein calorie malnutrition's recommend TPN pharmacy to dose outpatient pharmacy to order I will be willing to sign the orders this by the nutrition team at the pharmacist as an outpatient.    10/2/2024 patient continued to have a bone discomfort as a TPN through the PICC line tolerating diet recommend to have TPN at home as patient with severe protein calorie malnutrition's.  Nausea vomit attempt to keep other than few bites of clear liquid diet at this time.  Continue abdominal comfort postprandials, history of severe mesenteric artery syndrome required surgery remotely in the past on long-term TPN.  Patient has significant weight loss lately.  She has a muscle weakness with significant weakness in the left side with CVA in the past her strength on left side is significantly weaker than expected which is exacerbated by my nutritions which make risk for recurrent CVA .  Has significant weight loss lately .  Her body fat being lost gradually apparent from her orbitals and facial features .  Her  is weak on the left side .  I recommend patient to stay on TPN on discharge .    10/3/2024 discussed the problem with the insurance of excepting TPN with the patient and her .  Patient had a bad experience the past with with diarrhea severe after each tube feeding could be because of celiac disease.  Patient willing to try tube feeding that is her to be careful that we give her gluten-free tube feeding if he does not tolerate that we will present the doctor to the insurance.  Also patient walked with her connections with her insurance and other hospital regarding 
 Pharmacy Consult for TPN/PPN Monitoring & Adjustment  IV Access: central      Dosing weight: 45 kg  Current insulin regimen: low sliding scale  Diet (excluding TPN): Clear liquid diet  Maintenance fluid: none    Plan:  See components of tonight's TPN bag in the table below:  Date 09/30/24 10/1/24   Total kcal/kg 15 20   Total kcal 675 900   Protein g/kg 2 2   Protein g 90 90   Protein kcal (4 kcal/g) 360 360   Lipid % 30 30   Lipid g 20.3 27   Lipid kcal (10 kcal/g) 203 270   Dextrose g 33 79   Dextrose kcal (3.4 kcal/g) 112 270   Infusion Rate (mL/hr) 70 70   Na Acetate (mEq) 50 50   Na Chloride (mEq) 100 100   Na Phos (mmol)  (3 mmol = 4 mEq Na) 0 0   Total Na (mEq) 150 150   K Acetate (mEq) 30 30   K Chloride (mEq) 40 40   K Phos (mmol)  (15 mmol = 22 mEq K) 8 8   Total K (mEq) 81.73 81.73   Ca Gluconate (mEq)  (1 g = 4.65 mEq) 0 0   Mag Sulfate (mEq)  (1 g = 8.12 mEq) 8.12 8.12   Multivitamin (mL) 10 10   Trace Elements (mL) 1 1       Summary of changes for tonight's TPN:   Macronutrient changes per Dietary shown above  No micronutrient changes    Electrolyte Replacement:   Potassium chloride: 40 mEq      Monitoring:  BMP, Mag, iCal, & Phos ordered for tomorrow with AM labs.    Pharmacy will continue to follow daily. Thank you for the consult.  Lizbet Harrington, PharmD 10/1/2024 9:30 AM  
[facility]   PROGRESS NOTE      Patient: Ana Puente  Room #: 5K-03/003-A            YOB: 1985  Age: 39 y.o.  Gender: female            Admit Date & Time: 9/29/2024  1:21 PM    Assessment:    The patient adamantly welcomed a visit from the . The patient was in her bed with one leg on the floor.     Interventions:  The patient explored her views on theology and how she was becoming more Religion as she has grown older. The patient shared how she has explored unruly and requested reading and spiritual materials. The  suggested Athenatious, Desmond, Aquinus, and Parmjit. The patient explored her unruly and shared how her father was her only family still alive and hopes that she could reconcile prior to his death. The patient also shared her disease processes and her concerns. We concluded in prayer after the theological conversation.     Outcomes:  The patient was thankful for the provided prayer.     Plan:  1.Spiritual care will continue to follow the patient according to [facility] spiritual care SOP.       Electronically signed by Chaplain Maricel, on 10/9/2024 at 2:06 PM.  Spiritual Care Department  [facility]  [phone]     10/09/24 1359   Encounter Summary   Encounter Overview/Reason Follow-up   Service Provided For Patient   Referral/Consult From [facility]   Support System Spouse;Children;Family members;Lutheran/unruly community   Last Encounter  10/09/24   Complexity of Encounter High   Begin Time 1255   End Time  1335   Total Time Calculated 40 min   Spiritual/Emotional needs   Type Spiritual Support;Spiritual Distress   Assessment/Intervention/Outcome   Assessment Anxious;Coping;Desire for reconciliation;Hopeful;Impaired social interaction;Questioning unruly;Questioning meaning and purpose   Intervention Active listening;Discussed belief system/Pentecostal practices/unruly;Discussed illness injury and 
Comprehensive Nutrition Assessment    Type and Reason for Visit:  Initial, Positive Nutrition Screen, Consult (Parenteral Nutrition Order and Management (Only available at [facility], SFD, SFE, STV, STA, STC); unintentional weight loss, poor po)    Nutrition Recommendations/Plan:   PN recommendations for tonight's bag - use 45 kgm dosing wt; start with 15 kcals/kgm, 2 gm protein/kgm and 30% kcals from lipids.  Will increase kcals as line access, labs, pt. Status allow.  Recommend diet as per GI (as tolerated).  Will monitor POC vs. need for additional nutrition interventions, ? Home TPN.     Malnutrition Assessment:  Malnutrition Status:  Severe malnutrition (09/30/24 1313)    Context:  Acute Illness     Findings of the 6 clinical characteristics of malnutrition:  Energy Intake:  50% or less of estimated energy requirements for 5 or more days  Weight Loss:   (-8.4% in 1.5-2 months)     Body Fat Loss:  Moderate body fat loss Orbital, Buccal region   Muscle Mass Loss:  Moderate muscle mass loss Temples (temporalis), Clavicles (pectoralis & deltoids), Thigh (quadriceps), Hand (interosseous)  Fluid Accumulation:  Unable to assess     Strength:  Not Performed    Nutrition Assessment:     Pt. severely malnourished AEB criteria listed above.  At risk for further nutritional compromise r/t altered GI function (abdominal pain, u/a tolerate oral intake) and underlying medical condition (hx celiac disease, superior mesenteric artery syndrome, paralysis gastric, paralysis of colon, CVAm GERD).       Nutrition Related Findings:    Pt. Report/Treatments/Miscellaneous:   9/30-pt. Seen - reports mainly taking some CL pta (tea, water, etc), c/o N/V/abdominal pain; inability to tolerate po diet for a few months; reports this got worse after colonoscopy; states continues to lose weight; gets dizzy at times, feels like she can't think clearly d/t lack of nutrition; reports following diet closely for celiac disease, avoids cross 
Comprehensive Nutrition Assessment    Type and Reason for Visit:  Reassess    Nutrition Recommendations/Plan:   Continue current TPN macronutrients - 30 kcals/kgm, 2 gm protein/kgm, 30% kcals from lipids and 45 kgm dosing wt (at goal).  ? Consider discontinue IVF?  Recommend diet as tolerated per GI. Continue with Gluten Free diet restriction (hx celiac disease).  Continue daily weights as ordered.  TF discontinued 10/6 as pt. Did not tolerate per GI.  Rx per GI to assist with symptom management.  Plan f/u with GI as OP.  Continue to collaborate with medical team for home needs.  At this time - planning home w/ TPN from [facility].     Malnutrition Assessment:  Malnutrition Status:  Severe malnutrition (09/30/24 1313)    Context:  Acute Illness     Findings of the 6 clinical characteristics of malnutrition:  Energy Intake:  50% or less of estimated energy requirements for 5 or more days  Weight Loss:   (-8.4% in 1.5-2 months)     Body Fat Loss:  Moderate body fat loss Orbital, Buccal region   Muscle Mass Loss:  Moderate muscle mass loss Temples (temporalis), Clavicles (pectoralis & deltoids), Thigh (quadriceps), Hand (interosseous)  Fluid Accumulation:  Unable to assess     Strength:  Not Performed    Nutrition Assessment:      Pt. severely malnourished AEB criteria listed above. At risk for further nutritional compromise r/t altered GI function (abdominal pain, reports u/a tolerate oral intake) and underlying medical condition (hx celiac disease, superior mesenteric artery syndrome-treated surgically, paralysis gastric, paralysis of colon, CVA, GERD; pt. Reports hx of pancreatitis).     Nutrition Related Findings:   Pt. Report/Treatments/Miscellaneous:   10/9- pt. Seen - states stomach \"is feeling angry\"; reports nausea; denies any vomiting; passing gas; TPN infusing at 70 ml/hr; spoke w/  - information faxed to [facility]; pt. States has been told they can 
Comprehensive Nutrition Assessment    Type and Reason for Visit:  Reassess    Nutrition Recommendations/Plan:   Recommend increase 3-in-1 TPN to 20 kcal/kg and continue 2 g/kg protein and 30% lipid kcals based on dosing weight of 45 kg.  Diet per Provider.  Will send Strawberry Ensure Plus TID when diet is Full Liquid or greater.  Will monitor POC vs. Need for additional nutrition interventions, ? Home TPN.     Malnutrition Assessment:  Malnutrition Status:  Severe malnutrition (09/30/24 1313)    Context:  Acute Illness     Findings of the 6 clinical characteristics of malnutrition:  Energy Intake:  50% or less of estimated energy requirements for 5 or more days  Weight Loss:   (-8.4% in 1.5-2 months)     Body Fat Loss:  Moderate body fat loss Orbital, Buccal region   Muscle Mass Loss:  Moderate muscle mass loss Temples (temporalis), Clavicles (pectoralis & deltoids), Thigh (quadriceps), Hand (interosseous)  Fluid Accumulation:  Unable to assess     Strength:  Not Performed    Nutrition Assessment: Pt improving from a nutritional standpoint AEB tolerating Clar Liquid diet today even though po intake remains very minimal and continues on 3-in-1 TPN. Remains at risk for further nutritional compromise r/t altered GI function (abdominal pain, u/a tolerate oral intake) and underlying medical condition (hx celiac disease, superior mesenteric artery syndrome, paralysis gastric, paralysis of colon, CVAm GERD).       Nutrition Related Findings:    10/1- Pt seen this morning- tolerating water and tea this morning with no N/V. Pt states she has not ate much of anything else. Pt states she is amenable to consume the Strawberry Ensure Plus when diet is advanced. 3-in-1 TPN infusing at 70 ml/hr. Pt declines all questions at this time. KUB done this morning with no significant findings.  9/30-pt. Seen - reports mainly taking some CL pta (tea, water, etc), c/o N/V/abdominal pain; inability to tolerate po diet for a few months; 
Patient discharged home with spouse. Patient verbalizes understanding of discharge instructions. Chart tore down and placed in yellow bin.   
Pharmacy Consult for TPN/PPN Initiation    Indication for TPN: Clear liquid diet  Ordering Provider: Lamont Meyer          IV Access: central  Dosing weight: 45 kg  Current insulin regimen: low sliding scale  Maintenance fluid: none    Assessment/ Plan:  Begin 3-in-1 TPN today with the ingredients listed below:    Date 09/30/24   Total kcal/kg 15   Total kcal 675   Protein g/kg 2   Protein g 90   Protein kcal (4 kcal/g) 360   Lipid % 30   Lipid g 20.3   Lipid kcal (10 kcal/g) 203   Dextrose g 33   Dextrose kcal (3.4 kcal/g) 112   Infusion Rate (mL/hr) 70   Na Acetate (mEq) 50   Na Chloride (mEq) 100   Na Phos (mmol)  (3 mmol = 4 mEq Na) 0   Total Na (mEq) 150   K Acetate (mEq) 30   K Chloride (mEq) 40   K Phos (mmol)  (15 mmol = 22 mEq K) 8   Total K (mEq) 81.73   Ca Gluconate (mEq)  (1 g = 4.65 mEq) 0   Mag Sulfate (mEq)  (1 g = 8.12 mEq) 8.12   Multivitamin (mL) 10   Trace Elements (mL) 1     Electrolyte Replacement:   Potassium chloride: 40 mEq      Monitoring:  BMP, Mag, iCal, & Phos ordered for tomorrow with AM labs.    Pharmacy will continue to follow daily. Thank you for the consult.  Lizbet Harrington, PharmD 9/30/2024 10:15 AM  
Pharmacy Consult for TPN/PPN Monitoring & Adjustment  IV Access: central      Dosing weight: 45 kg  Current insulin regimen: low sliding scale  Diet (excluding TPN): Clear liquid diet  Maintenance fluid: 0.9% sodium chloride at 125 mL/hr    Plan:  See components of tonight's TPN bag in the table below:  Date 10/4/24 10/5/24 10/6/24 10/7/24   Total kcal/kg 25 25 25 30   Total kcal 1125 1125 1125 1350   Protein g/kg 2 2 2 2   Protein g 90 90 90 90   Protein kcal (4 kcal/g) 360 360 360 360   Lipid % 30 30 30 30   Lipid g 33.8 33.8 33.8 40.5   Lipid kcal (10 kcal/g) 338 338 338 405   Dextrose g 126 126 126 172   Dextrose kcal (3.4 kcal/g) 427 427 427 585   Infusion Rate (mL/hr) 70 70 70 70   Na Acetate (mEq) 60 60 60 60   Na Chloride (mEq) 100 100 100 100   Na Phos (mmol)  (3 mmol = 4 mEq Na) 0 0 0 0   Total Na (mEq) 160 160 160 160   K Acetate (mEq) 30 30 30 30   K Chloride (mEq) 50 30 30 30   K Phos (mmol)  (15 mmol = 22 mEq K) 6 0 0 0   Total K (mEq) 88.8 60 60 60   Ca Gluconate (mEq)  (1 g = 4.65 mEq) 0 0 0 0   Mag Sulfate (mEq)  (1 g = 8.12 mEq) 12.18 12.18 12.18 12.18   Multivitamin (mL) 10 0 0 10   Trace Elements (mL) 1 1 1 1       Summary of changes for tonight's TPN:   Increase total calorie intake to 30 kcal/kg per dietary phone call  Added multivitamin 10 ml (Shortage- only MWF)    Electrolyte Replacement:   None    Monitoring:  BMP, Mag, iCal, & Phos ordered for tomorrow with AM labs.    Pharmacy will continue to follow daily. Thank you for the consult.  Lizbet Harrington, PharmD 10/7/2024 9:50 AM    
Pharmacy Consult for TPN/PPN Monitoring & Adjustment  IV Access: central      Dosing weight: 45 kg  Current insulin regimen: low sliding scale  Diet (excluding TPN): Clear liquid diet  Maintenance fluid: 0.9% sodium chloride at 125 mL/hr    Plan:  See components of tonight's TPN bag in the table below:  Date 10/4/24 10/5/24 10/6/24 10/7/24 10/8/24   Total kcal/kg 25 25 25 30 30   Total kcal 1125 1125 1125 1350 1350   Protein g/kg 2 2 2 2 2   Protein g 90 90 90 90 90   Protein kcal (4 kcal/g) 360 360 360 360 360   Lipid % 30 30 30 30 30   Lipid g 33.8 33.8 33.8 40.5 40.5   Lipid kcal (10 kcal/g) 338 338 338 405 405   Dextrose g 126 126 126 172 172   Dextrose kcal (3.4 kcal/g) 427 427 427 585 585   Infusion Rate (mL/hr) 70 70 70 70 70   Na Acetate (mEq) 60 60 60 60 60   Na Chloride (mEq) 100 100 100 100 100   Na Phos (mmol)  (3 mmol = 4 mEq Na) 0 0 0 0 0   Total Na (mEq) 160 160 160 160 160   K Acetate (mEq) 30 30 30 30 30   K Chloride (mEq) 50 30 30 30 30   K Phos (mmol)  (15 mmol = 22 mEq K) 6 0 0 0 0   Total K (mEq) 88.8 60 60 60 60   Ca Gluconate (mEq)  (1 g = 4.65 mEq) 0 0 0 0 0   Mag Sulfate (mEq)  (1 g = 8.12 mEq) 12.18 12.18 12.18 12.18 12.18   Multivitamin (mL) 10 0 0 10 0   Trace Elements (mL) 1 1 1 1 1       Summary of changes for tonight's TPN:   No macronutrient changes per dietary phone call  Remove multivitamin 10 ml (Shortage- only MWF)    Electrolyte Replacement:   none    Monitoring:  BMP, Mag, iCal, & Phos ordered for tomorrow with AM labs.    Pharmacy will continue to follow daily. Thank you for the consult.  Lizbet Harrington, PharmD 10/8/2024 6:51 AM    
Pt admitted to  5K3 via in a wheelchair from ED.  Complaints: Weakness, Abd Pain, Dehydration.    IV normal saline infusing into the PICC at a rate of bolus mls/ hour with about 200 mls in the bag still. IV site free of s/s of infection or infiltration. Vital signs obtained. Assessment and data collection initiated. Two nurse skin assessment performed by Gilberto RN and Debbie RN. Oriented to room. Policies and procedures for  explained. All questions answered with no further questions at this time. Fall prevention and safety brochure discussed with patient.  Bed alarm on. Call light in reach.Oriented to room.   Gilberto Vail, RN, RN 9/29/2024 6:48 PM     Explained patients right to have family, representative or physician notified of their admission.  Patient has Declined for physician to be notified.  Patient has Declined for family/representative to be notified.    
Pt was in bed and alone at the time of the visit. She was dealing with malnutrition. She said that she does not want to talk much but desired only prayer. She was blessed.    10/03/24 1524   Encounter Summary   Encounter Overview/Reason Initial Encounter   Service Provided For Patient   Referral/Consult From [facility]   Support System Family members   Last Encounter  10/03/24   Complexity of Encounter Low   Begin Time 0922   End Time  0928   Total Time Calculated 6 min   Spiritual/Emotional needs   Type Spiritual Support   Assessment/Intervention/Outcome   Assessment Hopeful   Intervention Empowerment   Outcome Encouraged       
Received report from KERRY Casillas.  
Received report from KERRY Gimenez.  
Reported off to KERRY Casillas.  
Reported off to KERRY Gimenez.  
She denies BM. TPN initiated.    Medications:    Infusion Medications    PN-Adult  3 IN 1 Central Line (Custom) 70 mL/hr at 09/30/24 1846    sodium chloride      dextrose       Scheduled Medications    enoxaparin  30 mg SubCUTAneous Q24H    sodium chloride flush  5-40 mL IntraVENous 2 times per day    atorvastatin  40 mg Oral Nightly    buPROPion  300 mg Oral Every Other Day    clopidogrel  75 mg Oral Daily    insulin lispro  0-4 Units SubCUTAneous TID WC    insulin lispro  0-4 Units SubCUTAneous Nightly    pantoprazole  40 mg Oral QAM AC    valACYclovir  1,000 mg Oral Daily    sucralfate  1 g Oral 4x Daily AC & HS    lipase-protease-amylase  5,000 Units Oral TID WC     PRN Meds: promethazine, sodium chloride flush, sodium chloride, potassium chloride **OR** potassium alternative oral replacement **OR** potassium chloride, magnesium sulfate, ondansetron **OR** ondansetron, polyethylene glycol, acetaminophen **OR** acetaminophen, HYDROmorphone **OR** HYDROmorphone, glucose, dextrose bolus **OR** dextrose bolus, glucagon (rDNA), dextrose      Intake/Output Summary (Last 24 hours) at 10/1/2024 0921  Last data filed at 9/30/2024 1845  Gross per 24 hour   Intake 300 ml   Output --   Net 300 ml       Diet:  ADULT DIET; Clear Liquid; Gluten Free  PN-Adult  3 IN 1 Central Line (Custom)    Exam:  BP (!) 84/62   Pulse 70   Temp 98.3 °F (36.8 °C) (Oral)   Resp 16   Ht 1.524 m (5')   Wt 44.7 kg (98 lb 9.6 oz)   LMP 01/06/2010   SpO2 100%   BMI 19.26 kg/m²     Physical Exam  Constitutional:       Interventions: She is not intubated.  Pulmonary:      Effort: She is not intubated.   Abdominal:      Tenderness: There is abdominal tenderness.      Comments: +BS   Neurological:      Mental Status: She is alert.   Psychiatric:         Speech: She is communicative.        Labs:   Recent Labs     09/29/24  1335 09/30/24  0640 10/01/24  0400   WBC 4.9 4.0* 6.1   HGB 12.0 10.5* 10.4*   HCT 35.3* 31.3* 31.0*    233 208 
100 84 102     Hepatic:   No results for input(s): \"AST\", \"ALT\", \"BILITOT\", \"ALKPHOS\" in the last 72 hours.    Invalid input(s): \"ALB\"    INR: No results for input(s): \"INR\" in the last 72 hours.    Imaging:  Results for orders placed during the hospital encounter of 09/29/24    XR ABDOMEN (KUB) (SINGLE AP VIEW)    Narrative  Abdomen one view    COMPARISON: 9/29/24 CT abdomen pelvis    FINDINGS:  There are no suspicious dilated gas-filled loops of small or large bowel.  There is no significant retained stool.    Impression  Nonobstructive bowel gas pattern.    This document has been electronically signed by: Abhinav Multani MD on  10/01/2024 09:13 AM    Results for orders placed during the hospital encounter of 09/29/24    CT ABDOMEN PELVIS W IV CONTRAST Additional Contrast? None    Narrative  PROCEDURE: CT ABDOMEN PELVIS W IV CONTRAST    CLINICAL INFORMATION: 39-year-old female with abdominal pain.    COMPARISON: CT 9/14/2024    TECHNIQUE: 5 mm axial CT images were obtained through the abdomen and pelvis  after the administration of intravenous and oral contrast. Coronal and sagittal  reconstructions were obtained.    All CT scans at this facility use dose modulation, iterative reconstruction,  and/or weight-based dosing when appropriate to reduce radiation dose to as low  as reasonably achievable.    FINDINGS:  Bilateral breast implants are partially visualized.    The lung bases are clear.    The base of the heart is within acceptable limits.    The liver is enlarged and hypoattenuated. This may be related to fatty  infiltration.    The gallbladder is nonvisualized.    The pancreas and adrenal glands are within normal limits.    The kidneys are symmetric in size, shape and degree of enhancement. There is no  hydronephrosis.    There is no evidence of a small bowel obstruction.    Lack of mesenteric fat limits the evaluation.    There is a large amount retained stool throughout the colon.    There is no ascites or free 
communicative.        Labs:   Recent Labs     09/29/24  1335 09/30/24  0640 10/01/24  0400   WBC 4.9 4.0* 6.1   HGB 12.0 10.5* 10.4*   HCT 35.3* 31.3* 31.0*    233 208     Recent Labs     09/30/24  0640 10/01/24  0400 10/02/24  0630    138 142   K 3.3* 4.5 4.4    107 111   CO2 21* 24 21*   BUN 4* 7 9   CREATININE 0.7 0.6 0.5   CALCIUM 8.3* 8.0* 7.7*   PHOS 3.7 4.4 2.9     Recent Labs     09/29/24  1335   AST 16   ALT 19   BILITOT 0.4   ALKPHOS 66     No results for input(s): \"INR\" in the last 72 hours.  No results for input(s): \"CKTOTAL\", \"TROPONINI\" in the last 72 hours.    Urinalysis:      Lab Results   Component Value Date/Time    NITRU NEGATIVE 09/30/2024 08:00 AM    WBCUA 0-5 09/22/2024 12:19 AM    BACTERIA Trace 09/22/2024 12:19 AM    RBCUA 0-2 09/22/2024 12:19 AM    BLOODU NEGATIVE 09/30/2024 08:00 AM    SPECGRAV 1.020 11/28/2016 10:36 AM    GLUCOSEU NEGATIVE 09/30/2024 08:00 AM       Radiology:  XR ABDOMEN (KUB) (SINGLE AP VIEW)   Final Result      Nonobstructive bowel gas pattern.      This document has been electronically signed by: Abhinav Multani MD on    10/01/2024 09:13 AM      XR CHEST PORTABLE   Final Result   There is a left-sided PICC terminating in the SVC.            **This report has been created using voice recognition software. It may contain   minor errors which are inherent in voice recognition technology.**      Electronically signed by Dr Dontrell Woodward      CT ABDOMEN PELVIS W IV CONTRAST Additional Contrast? None   Final Result      1. No acute process in the abdomen or pelvis.   2. Large amount of retained stool throughout the colon which could be the result   of constipation.            **This report has been created using voice recognition software. It may contain   minor errors which are inherent in voice recognition technology.**            Electronically signed by Dr Dontrell Woodward      CT Head W/O Contrast   Final Result   No acute intracranial hemorrhage, 
dextrose    Exam:  /83   Pulse 76   Temp 98.9 °F (37.2 °C) (Oral)   Resp 16   Ht 1.524 m (5')   Wt 48.6 kg (107 lb 3.2 oz)   LMP 01/06/2010   SpO2 98%   BMI 20.94 kg/m²   General: No distress, appears stated age. Thin & frail   Eyes:  PERRL. Conjunctivae/corneas clear.  Neck: Supple, with full range of motion. Trachea midline.  No gross JVD appreciated.  Respiratory:  Normal effort. Clear to auscultation, without rales or wheezes or rhonchi.  Cardiovascular: Normal rate, regular rhythm with normal S1/S2 without murmurs.    No lower extremity edema.   Abdomen: Soft, mildly tender in epigastric region, non-distended with normal bowel sounds.  Musculoskeletal: No joint swelling or tenderness. Normal tone. No abnormal movements. Muscle wasting noted   Skin: Warm and dry. No rashes or lesions.  Neurologic:  No focal sensory/motor deficits in the upper or lower extremities. Cranial nerves:  grossly non-focal 2-12.     Psychiatric: Alert and oriented- pleasant mood today   Capillary Refill: Brisk,< 3 seconds.  Peripheral Pulses: +2 palpable, equal bilaterally.       Labs/Radiology: See chart or assessment above.     Electronically signed by Catrachita Noonan PA-C on 10/9/2024 at 11:38 AM    
distress, appears stated age. Thin & frail   Eyes:  PERRL. Conjunctivae/corneas clear.  HENT: Head normal appearing. Nares normal. Oral mucosa moist.  Hearing intact.   Neck: Supple, with full range of motion. Trachea midline.  No gross JVD appreciated.  Respiratory:  Normal effort. Clear to auscultation, without rales or wheezes or rhonchi.  Cardiovascular: Normal rate, regular rhythm with normal S1/S2 without murmurs.    No lower extremity edema.   Abdomen: Soft, mildly tender, non-distended with normal bowel sounds.  Musculoskeletal: No joint swelling or tenderness. Normal tone. No abnormal movements.   Skin: Warm and dry. No rashes or lesions.  Neurologic:  No focal sensory/motor deficits in the upper or lower extremities. Cranial nerves:  grossly non-focal 2-12.     Psychiatric: Alert and oriented, frustrated   Capillary Refill: Brisk,< 3 seconds.  Peripheral Pulses: +2 palpable, equal bilaterally.       Labs/Radiology: See chart or assessment above.     Electronically signed by Finesse Aleman MD on 10/5/2024 at 12:57 PM    
specialist teams there and was trialed on rifaximin for possible SIBO. She was also started on Bentyl. The patient denies significant improvement with her symptoms for this. There was apparently an issue regarding TPN and insurance coverage, so the patient ended up leaving [facility] in order to seek treatment locally. She was supposed to see her gastroenterologist, but missed her appointment due to not feeling well. She presented today for further evaluation and gastroenterology recommend admission with initiation of TPN; did not like Ensure Clear last admission; states saw a RD as OP last week and PN was recommended  Per 9/2024 visit-states receiving TPN for 18 months in [id number] then SMA was repair and was able to begin eating again; reports compliance with Gluten Free diet pta  Pt seen - pt reports poor intake of meals over the past month d/t abdominal pain with nausea/emesis. Pt reports unplanned weight loss of 30 lbs in the past 6 months. Pt reports poor tolerance of the Clear Liquid d/t having pain every time she eats anything and pt reports all food and liquids cause pain. Pt states that Dr. Dahl brought up TPN with her to see if she is interested in it if appropriate. Pt reports she is interested in TPN.   GI Status: pt. Reports last BM 9/20; denies passing any gas  Pertinent Labs: 10/2: Glucose 94, BUN 9, Cr 0.5, Potassium 4.4, Magnesium 1.7, Phosphorus 2.9  Pertinent Meds: Zenpep, Carafate, Phenergan, Zofran, PPI, Glycolax, Dilaudid, Lipitor      Wound Type: None       Current Nutrition Intake & Therapies:    Average Meal Intake:  (150 ml po intake past 24 hours)  Average Supplements Intake:  (dislikes Ensure Clear. Will send Strawberry Ensure Plus when diet is advanced.)  ADULT DIET; Clear Liquid; Gluten Free  PN-Adult  3 IN 1 Central Line (Custom)  PN-Adult  3 IN 1 Central Line (Custom)  Current Parenteral Nutrition Orders:  Type and Formula: 3-in-1 Custom (dosing wt: 45 kgm, 20 kcals/kgm, 2 gm protein/kgm 
edema    Assessment and Plan:   Protein calorie malnutrition not improving stopping TPN  Recommend the patient stay on TPN on the discharge hospitalist to arrange for the supply and be sending the order as an outpatient to continue with the TPN  I am willing to renew and monitor the patient after discharge with the TPN and sign future orders .  Start Milaralx BID , and since the bowel start to move and initially confirm there is no further constipations then the dose can be adjusted      Follow up in GI Clinic after discharge in 3 week(s)    Patient Active Problem List:     JESUSITA (generalized anxiety disorder)     Tobacco abuse     Asthma     Gastroesophageal reflux disease     Herpes simplex vulvovaginitis     Controlled substance agreement signed     Abdominal pain     Celiac sprue     SMAS (superior mesenteric artery syndrome) (HCC)     History of CVA (cerebrovascular accident)     Severe malnutrition (HCC)     Hypotension     Low grade fever     Anxiety     Starvation ketoacidosis     Malnutrition, unspecified type (HCC)     Intractable abdominal pain      Electronically signed by Jorge Dahl MD on 10/2/2024 at 3:48 PM          
for celiac disease, avoids cross contamination, avoids cosmetic products w/ gluten etc; was [facility] last admission - per H&P-She was evaluated by multiple specialist teams there and was trialed on rifaximin for possible SIBO. She was also started on Bentyl. The patient denies significant improvement with her symptoms for this. There was apparently an issue regarding TPN and insurance coverage, so the patient ended up leaving [place] in order to seek treatment locally. She was supposed to see her gastroenterologist, but missed her appointment due to not feeling well. She presented today for further evaluation and gastroenterology recommend admission with initiation of TPN; did not like Ensure Clear last admission; states saw a RD as OP last week and PN was recommended  GI Status: no BM since 9/20; reports \"farting\" today  Pertinent Labs: 10/3: Glucose 100, BUN 10, Cr 0.5, Magnesium 1.7, Phosphorus 2.8, Potassium 4.5  Pertinent Meds: Zenpep, Carafate 4 times/day, Phenergan, Zofran, Glycolax, Dilaudid      Wound Type: None       Current Nutrition Intake & Therapies:    Average Meal Intake:  (140 ml documented past 24 hours)  Average Supplements Intake:  (dislikes Ensure Clear. Will send Strawberry Ensure Plus when diet is advanced.)  ADULT DIET; Clear Liquid; Gluten Free  PN-Adult  3 IN 1 Central Line (Custom)  PN-Adult  3 IN 1 Central Line (Custom)  Current Parenteral Nutrition Orders:  Type and Formula:  (45 kgm dosing wt; 25 kcals/kgm, 2 gm protein/kgm and 30% kcals from lipids)   Lipids: Daily  Duration: Continuous  Rate/Volume: 70 ml/hr - per pharmacy  Current PN Order Provides: 1126.4 kcals, 90 grams protein, 126 gm CHO, 33.8 gm lipids/day    Anthropometric Measures:  Height: 152.4 cm (5')  Ideal Body Weight (IBW): 100 lbs (45 kg)    Admission Body Weight: 44.7 kg (98 lb 9.6 oz) (9/30 no edema)  Current Body Weight: 44.7 kg (98 lb 9.6 oz) (9/30 no edema),    Current BMI (kg/m2): 19.3  Usual Body Weight:  (per EMR: 
tolerated in the past; states has done well with [facility] in the past; spoke w/ Natalya, ; states called [facility]- pt. Doesn't not have coverage for TPN as not documented to be a medical necessity; Dr. Dahl notified; Natalya and myself discussed w/ Serge Parnell PA-C- to reach out to GI as well to establish if TPN a medical necessity; pt. Reports has appointments w/ GI specialists 2/25 and Spring of '25; note -per GI c/s-She had extensive workup done at [facility] before she was transferred to [facility]. The patient was seen in the past by [facility].-were CT scan of the abdomen and pelvis, chest x-ray, small-bowel follow-through. CT done more than one time, last time with enterography, small bowel follow-through, basically all negative; 9/29 CT abd - showing large amount of retained stool in colon which could be result of constipation; pt. Reports thinking that the stroke has impacted her GI tract causing her current issues  10/1- Pt seen this morning- tolerating water and tea this morning with no N/V. Pt states she has not ate much of anything else. Pt states she is amenable to consume the Strawberry Ensure Plus when diet is advanced. 3-in-1 TPN infusing at 70 ml/hr. Pt declines all questions at this time. KUB done this morning with no significant findings.   9/30-pt. Seen - reports mainly taking some CL pta (tea, water, etc), c/o N/V/abdominal pain; inability to tolerate po diet for a few months; reports this got worse after colonoscopy; states continues to lose weight; gets dizzy at times, feels like she can't think clearly d/t lack of nutrition; reports following diet closely for celiac disease, avoids cross contamination, avoids cosmetic products w/ gluten etc; was [facility] last admission - per H&P-She was evaluated by multiple specialist teams there and was trialed on rifaximin for possible SIBO. She was also started on Bentyl. The patient denies significant 
within normal limits.    The kidneys are symmetric in size, shape and degree of enhancement. There is no  hydronephrosis.    There is no evidence of a small bowel obstruction.    Lack of mesenteric fat limits the evaluation.    There is a large amount retained stool throughout the colon.    There is no ascites or free intraperitoneal air.    The aorta is normal in caliber without aneurysmal dilatation.    The bones are intact. No acute fractures.    Impression  1. No acute process in the abdomen or pelvis.  2. Large amount of retained stool throughout the colon which could be the result  of constipation.        **This report has been created using voice recognition software. It may contain  minor errors which are inherent in voice recognition technology.**        Electronically signed by Dr Dontrell Woodward    No results found for this or any previous visit.    No results found for this or any previous visit.      Endoscopy Finding:   N/A    Objective:   Vitals: /64   Pulse 66   Temp 98.4 °F (36.9 °C) (Oral)   Resp 18   Ht 1.524 m (5')   Wt 52.9 kg (116 lb 9.6 oz)   LMP 01/06/2010   SpO2 97%   BMI 22.77 kg/m²     Intake/Output Summary (Last 24 hours) at 10/6/2024 1139  Last data filed at 10/6/2024 0938  Gross per 24 hour   Intake 698 ml   Output 700 ml   Net -2 ml     General appearance: alert and cooperative with exam  Lungs: clear to auscultation bilaterally  Heart: regular rate and rhythm, S1, S2 normal, no murmur, click, rub or gallop  Abdomen:  distended, semi-soft, hypoactive BS x 4.  (+) scars.  Diffuse tenderness t/o.  Extremities: extremities normal, atraumatic, no cyanosis or edema    Assessment and Plan:   Protein calorie malnutrition not improving, continue TPN.  Dobhoff in place with TF infusing.  Recommend the patient stay on TPN at discharge. Hospitalist to arrange for the supply and be sending the order as an outpatient to continue with the TPN  I am willing to renew and monitor the 
18.5-24.9)    Estimated Daily Nutrient Needs:  Energy Requirements Based On: Kcal/kg  Weight Used for Energy Requirements: Other (Comment) (45)  Energy (kcal/day): 4624-3751 kcals (30-35)  Weight Used for Protein Requirements: Other (Comment) (45)  Protein (g/day): 68-90 grams (1.5-2)  Method Used for Fluid Requirements: 1 ml/kcal  Fluid (ml/day): 1962-5323 ml (30-35 ml/kgm)    Nutrition Diagnosis:   Severe malnutrition related to altered GI function as evidenced by Criteria as identified in malnutrition assessment    Nutrition Interventions:   Food and/or Nutrient Delivery: Continue Current Diet, Start Tube Feeding, Continue Current Parenteral Nutrition  Nutrition Education/Counseling: Education initiated  Coordination of Nutrition Care: Continue to monitor while inpatient       Goals:  Previous Goal Met: Progressing toward Goal(s)  Goals: Meet at least 75% of estimated needs, by next RD assessment       Nutrition Monitoring and Evaluation:      Food/Nutrient Intake Outcomes: Diet Advancement/Tolerance, Food and Nutrient Intake, Enteral Nutrition Intake/Tolerance, Parenteral Nutrition Intake/Tolerance, IVF Intake  Physical Signs/Symptoms Outcomes: Biochemical Data, GI Status, Fluid Status or Edema, Nutrition Focused Physical Findings, Skin, Weight    Discharge Planning:    Too soon to determine     Edna Izaguirre, GABINO, LD  Contact: [phone]      
while inpatient       Goals:  Previous Goal Met: Progressing toward Goal(s)  Goals: Meet at least 75% of estimated needs, by next RD assessment       Nutrition Monitoring and Evaluation:      Food/Nutrient Intake Outcomes: Diet Advancement/Tolerance, Food and Nutrient Intake, Parenteral Nutrition Intake/Tolerance, IVF Intake  Physical Signs/Symptoms Outcomes: Biochemical Data, GI Status, Fluid Status or Edema, Nutrition Focused Physical Findings, Skin, Weight    Discharge Planning:    Too soon to determine     Edna Izaguirre RD, LD  Contact: [phone]

## 2024-10-09 NOTE — ADT AUTH CERT
RA  Body mass index is 24.41 kg/m²        ABNL/PERTINENT LABS/RADIOLOGY/DIAGNOSTIC STUDIES:  09/29/24 13:35  CARBON DIOXIDE: 20 (L)  BUN,BUNPL: 6 (L)  Creatinine: 0.7  Glucose: 176 (H)  WBC: 4.9  RBC: 3.84 (L)  Hematocrit: 35.3 (L)  MPV: 9.2 (L)  Monocytes Absolute: 0.3 (L)     09/29/24 16:10  Lactic Acid: 0.9     09/29/24 16:08  CT HEAD   No acute intracranial hemorrhage, mass effect or midline shift.      CT ABDOMEN PELVIS   1. No acute process in the abdomen or pelvis.   2. Large amount of retained stool throughout the colon which could be the result of constipation.      09/29/24 19:16  XR CHEST   There is a left-sided PICC terminating in the SVC.      09/29/24 13:31  EKG 12-LEAD: Rpt  Normal sinus rhythm with sinus arrhythmia  Possible Left atrial enlargement  Rightward axis  Borderline ECG  Atrial Rate: 82  P Axis: 85  P-R Interval: 152  Q-T Interval: 360  QRS Duration: 72  QTc Calculation (Bazett): 420  R Axis: 93  T Axis: 78  Ventricular Rate: 82        ED TREATMENT:  Labs/Imaging/EKG     (DILAUDID) injection 0.5 mg x1   SUBLIMAZE) injection 25 mcg x1   (ZOFRAN) injection 4 mg x1  PHENERGAN) injection 12.5 mg x1   sodium chloride 0.9 % bolus 1,000 mL x1         PERTINENT MEDICAL HISTORY:  celiac disease, superior mesenteric artery syndrome, stroke, anxiety/depression, and marijuana use         PHYSICAL EXAM:  General: Alert, in no acute distress, cooperative  HEENT:  Normocephalic and atraumatic.  No scleral icterus. External nares without deformity.  External ears normal. Mucous membranes are moist.  Neck: Trachea midline. No jugular venous distension.  Lungs: On room air. Respiratory rate and effort normal.  Lungs clear to auscultation bilaterally.  Cardiac: Regular rhythm and rate.  No murmur, rubs, or gallops.  Abdomen: Non-distended and soft.  Diffuse abdominal tenderness.. No guarding or rigidity.  Bowel sounds normoactive.  Extremities:  No clubbing, cyanosis, or lower extremity edema.  Vasculature:

## 2024-10-10 NOTE — DISCHARGE SUMMARY
10/16/2024  Considerations for enteroscopy  Appointment scheduled @ 1:15pm    Pain Management    Follow up on 10/15/2024      [facility]- Gastroenterology    Follow up  Please call for hospital follow up         Discharge Medications:        Medication List        START taking these medications      docusate 100 MG Caps  Commonly known as: COLACE, DULCOLAX  Take 100 mg by mouth 2 times daily     oxyCODONE HCl 10 MG immediate release tablet  Commonly known as: OXY-IR  Take 1 tablet by mouth every 6 hours as needed for Pain for up to 6 days. Max Daily Amount: 40 mg     promethazine 25 MG tablet  Commonly known as: PHENERGAN  Take 1 tablet by mouth every 6 hours as needed for Nausea     TPN WITH LIPIDS (OUTPATIENT ONLY)  Continuous TPN to be administered at 70 ml/hr over 24 hours.   Pharmacy to dose            CONTINUE taking these medications      atorvastatin 40 MG tablet  Commonly known as: LIPITOR     buPROPion 300 MG extended release tablet  Commonly known as: WELLBUTRIN XL     CENTRUM SILVER 50+WOMEN PO     clopidogrel 75 MG tablet  Commonly known as: PLAVIX     lipase-protease-amylase 6000-43972 units delayed release capsule  Commonly known as: CREON     Nurtec 75 MG Tbdp  Generic drug: rimegepant sulfate     ondansetron 4 MG disintegrating tablet  Commonly known as: ZOFRAN-ODT  Take 1 tablet by mouth every 8 hours as needed for Nausea or Vomiting     pantoprazole 40 MG tablet  Commonly known as: PROTONIX  take 1 tablet by mouth once daily     polyethylene glycol 17 g packet  Commonly known as: GLYCOLAX  Take 1 packet by mouth daily as needed for Constipation     sucralfate 1 GM tablet  Commonly known as: CARAFATE     tiZANidine 4 MG tablet  Commonly known as: ZANAFLEX     valACYclovir 1 g tablet  Commonly known as: VALTREX  Take 1 tablet by mouth daily            STOP taking these medications      insulin lispro 100 UNIT/ML Soln injection vial  Commonly known as: HUMALOG,ADMELOG

## 2025-02-27 ENCOUNTER — HOSPITAL ENCOUNTER (INPATIENT)
Age: 40
LOS: 4 days | Discharge: HOME | DRG: 885 | End: 2025-03-03
Attending: PSYCHIATRY & NEUROLOGY | Admitting: PSYCHIATRY & NEUROLOGY
Payer: COMMERCIAL

## 2025-02-27 ENCOUNTER — APPOINTMENT (OUTPATIENT)
Dept: MRI IMAGING | Age: 40
DRG: 885 | End: 2025-02-27
Attending: PSYCHIATRY & NEUROLOGY
Payer: COMMERCIAL

## 2025-02-27 PROBLEM — F23 ACUTE PSYCHOSIS (HCC): Status: ACTIVE | Noted: 2025-02-27

## 2025-02-27 PROCEDURE — 6370000000 HC RX 637 (ALT 250 FOR IP): Performed by: PHYSICIAN ASSISTANT

## 2025-02-27 PROCEDURE — 99222 1ST HOSP IP/OBS MODERATE 55: CPT | Performed by: PSYCHIATRY & NEUROLOGY

## 2025-02-27 PROCEDURE — 70551 MRI BRAIN STEM W/O DYE: CPT

## 2025-02-27 PROCEDURE — APPSS30 APP SPLIT SHARED TIME 16-30 MINUTES: Performed by: PHYSICIAN ASSISTANT

## 2025-02-27 PROCEDURE — 6370000000 HC RX 637 (ALT 250 FOR IP): Performed by: PSYCHIATRY & NEUROLOGY

## 2025-02-27 PROCEDURE — 6360000002 HC RX W HCPCS: Performed by: PSYCHIATRY & NEUROLOGY

## 2025-02-27 PROCEDURE — 1240000000 HC EMOTIONAL WELLNESS R&B

## 2025-02-27 RX ORDER — ACETAMINOPHEN 325 MG/1
650 TABLET ORAL EVERY 4 HOURS PRN
Status: DISCONTINUED | OUTPATIENT
Start: 2025-02-27 | End: 2025-03-03 | Stop reason: HOSPADM

## 2025-02-27 RX ORDER — LORAZEPAM 2 MG/ML
2 INJECTION INTRAMUSCULAR EVERY 6 HOURS PRN
Status: DISCONTINUED | OUTPATIENT
Start: 2025-02-27 | End: 2025-03-03 | Stop reason: HOSPADM

## 2025-02-27 RX ORDER — HALOPERIDOL 5 MG/ML
5 INJECTION INTRAMUSCULAR EVERY 6 HOURS PRN
Status: DISCONTINUED | OUTPATIENT
Start: 2025-02-27 | End: 2025-03-03 | Stop reason: HOSPADM

## 2025-02-27 RX ORDER — ATORVASTATIN CALCIUM 40 MG/1
40 TABLET, FILM COATED ORAL DAILY
Status: DISCONTINUED | OUTPATIENT
Start: 2025-02-27 | End: 2025-03-03 | Stop reason: HOSPADM

## 2025-02-27 RX ORDER — MAGNESIUM HYDROXIDE/ALUMINUM HYDROXICE/SIMETHICONE 120; 1200; 1200 MG/30ML; MG/30ML; MG/30ML
30 SUSPENSION ORAL EVERY 6 HOURS PRN
Status: DISCONTINUED | OUTPATIENT
Start: 2025-02-27 | End: 2025-03-03 | Stop reason: HOSPADM

## 2025-02-27 RX ORDER — POLYETHYLENE GLYCOL 3350 17 G
2 POWDER IN PACKET (EA) ORAL
Status: DISCONTINUED | OUTPATIENT
Start: 2025-02-27 | End: 2025-03-03

## 2025-02-27 RX ORDER — LORAZEPAM 2 MG/1
2 TABLET ORAL EVERY 6 HOURS PRN
Status: DISCONTINUED | OUTPATIENT
Start: 2025-02-27 | End: 2025-03-03 | Stop reason: HOSPADM

## 2025-02-27 RX ORDER — HYDROXYZINE HYDROCHLORIDE 25 MG/1
50 TABLET, FILM COATED ORAL 3 TIMES DAILY PRN
Status: DISCONTINUED | OUTPATIENT
Start: 2025-02-27 | End: 2025-02-28

## 2025-02-27 RX ORDER — TRAZODONE HYDROCHLORIDE 50 MG/1
50 TABLET ORAL NIGHTLY PRN
Status: DISCONTINUED | OUTPATIENT
Start: 2025-02-27 | End: 2025-03-03 | Stop reason: HOSPADM

## 2025-02-27 RX ORDER — HALOPERIDOL 5 MG/1
5 TABLET ORAL EVERY 6 HOURS PRN
Status: DISCONTINUED | OUTPATIENT
Start: 2025-02-27 | End: 2025-03-03 | Stop reason: HOSPADM

## 2025-02-27 RX ORDER — PANTOPRAZOLE SODIUM 40 MG/1
40 TABLET, DELAYED RELEASE ORAL
Status: DISCONTINUED | OUTPATIENT
Start: 2025-02-27 | End: 2025-03-03 | Stop reason: HOSPADM

## 2025-02-27 RX ORDER — CLOPIDOGREL BISULFATE 75 MG/1
75 TABLET ORAL DAILY
Status: DISCONTINUED | OUTPATIENT
Start: 2025-02-27 | End: 2025-03-03 | Stop reason: HOSPADM

## 2025-02-27 RX ORDER — OLANZAPINE 5 MG/1
5 TABLET ORAL 2 TIMES DAILY
Status: DISCONTINUED | OUTPATIENT
Start: 2025-02-27 | End: 2025-03-03 | Stop reason: HOSPADM

## 2025-02-27 RX ADMIN — LORAZEPAM 2 MG: 2 INJECTION INTRAMUSCULAR; INTRAVENOUS at 09:56

## 2025-02-27 RX ADMIN — ATORVASTATIN CALCIUM 40 MG: 40 TABLET, FILM COATED ORAL at 12:09

## 2025-02-27 RX ADMIN — HALOPERIDOL LACTATE 5 MG: 5 INJECTION, SOLUTION INTRAMUSCULAR at 09:56

## 2025-02-27 RX ADMIN — OLANZAPINE 5 MG: 5 TABLET, FILM COATED ORAL at 21:56

## 2025-02-27 RX ADMIN — PANTOPRAZOLE SODIUM 40 MG: 40 TABLET, DELAYED RELEASE ORAL at 12:09

## 2025-02-27 RX ADMIN — HYDROXYZINE HYDROCHLORIDE 50 MG: 25 TABLET, FILM COATED ORAL at 21:57

## 2025-02-27 RX ADMIN — OLANZAPINE 5 MG: 5 TABLET, FILM COATED ORAL at 12:09

## 2025-02-27 RX ADMIN — NICOTINE POLACRILEX 2 MG: 2 LOZENGE ORAL at 22:02

## 2025-02-27 RX ADMIN — TRAZODONE HYDROCHLORIDE 50 MG: 100 TABLET ORAL at 21:57

## 2025-02-27 RX ADMIN — CLOPIDOGREL BISULFATE 75 MG: 75 TABLET ORAL at 12:09

## 2025-02-27 NOTE — PROGRESS NOTES
Arrived to the unit with security by [employer], pt rambling and elevated on unit, demanding to be released and insisting on being discharged. Breakfast and drinks given, warm blanket given for comfort. Admission process started. Allergies reviewed with patient. Pt continues to ramble and walking/pacing on the unit. Pt refuses vital signs and refuses to answer questions.

## 2025-02-27 NOTE — PROGRESS NOTES
BEHAVIORAL SERVICES: One - Hour In- Person Review  For Management of Violent or Self - Destructive Behavior    Seclusion/Restraint:  Physical Hold     Reason for Intervention: Patient was escalating on the unit demanding multiple things including discharge. She was not redirectable. She was offered PO emergency medication but refused. [place] police had to perform a physical hold in order to administer emergency medication intramuscularly     Response to Intervention:  Patient thrashed and tried to claw at campus police. She continued to demand to be discharged.     Medical Record reviewed and discussed precipitating events/behaviors with RN initiating Intervention:  Yes    Patient Medical Status:  Vital Signs: pulse and blood pressure elevated. 146/109 and pulse 103  Respiratory Status: intact  Circulatory Status: intact  Skin Integrity: intact     Orientation: oriented to person, place, and time/date. Not to situation     Mood/Affect: irritable    Speech: rapid, pressured, irritable tone. Moderate volume     Thought Content: paranoid delusions noted     Thought Processes: Racing and Flight of Ideas    Rationale for continued use of intervention: Patient continues to escalate on the unit, is not redirectable and refuse medications to de-escalate her when offered    Rationale for discontinuing intervention: Patient is able to: remain calm and cooperative with staff and is redirectable     One Hour Review Evaluation Physician Notification:  Dr. Romero was notified

## 2025-02-27 NOTE — PROGRESS NOTES
Patient pacing in front of the nurses station, patient is very discharged focused and repeatedly asks to leave the unit or call to have someone pick her up. Patient continued to escalate and have increased agitation. Patient noted with rapid cycling thoughts and is unable to be redirected at this time. [place] police contacted for show of support. Patient walked to her room independently and was offered oral medications for agitation but refused. Haldol 5 mg and ativan 2 mg administered per MAR for agitation per order.

## 2025-02-27 NOTE — PATIENT CARE CONFERENCE
Behavioral Health  Initial Interdisciplinary Treatment Plan NOTE    REVIEW DATE AND TIME: 2/27/25 1514    PATIENT was not IN TREATMENT TEAM.  See Multidisciplinary Treatment Team sheet for participants.    ADMISSION TYPE:        REASON FOR ADMISSION:         Estimated Length of Stay Update:  3-5 days  Estimated Discharge Date Update: 2/29/25       Addictive Behavior:   Medical Problems:  Past Medical History:   Diagnosis Date    Anxiety     Celiac sprue     Paralysis gastric     Paralysis of colon (HCC)     Pneumonia 2010    Superior mesenteric artery syndrome     Uncomplicated severe persistent asthma (HCC) 11/28/2016       EDUCATION:   Learner Progress Toward Treatment Goals: Reviewed results and recommendations of this team, Reviewed group plan and strategies, Reviewed signs, symptoms and risk of self harm and violent behavior, and Reviewed goals and plan of care    Method: Individual    Outcome: GADIEL due to acuity of symptoms    PATIENT GOALS: Stabilize patient psychiatric symptoms    OQ PRIORITIES IDENTIFIED BY THE PATIENT ON ADMISSION: (These are the symptoms that the patient has identified that are impacting them the most at the time of admission based on their own input on the OQ.  These priorities are to be included in all of their care while admitted.)        Not appropriate    PLAN/TREATMENT RECOMMENDATIONS UPDATE:   What is the most important thing we can help you with while you are here? See above  Who is your support system? Per report, patient has support from her  Polo  Do you have follow-up providers? GADIEL due to acuity of symptoms  Do you have the ability to pay for your medications? Yes, [facility]  Where will you be residing when you leave the hospital? Per report, patient resides by self due to the acuity of her symptoms but her  checks on her daily  Will need a return to work slip or FMLA paper completion? GADIEL due to acuity of symptoms      GOALS UPDATE:   Time frame for

## 2025-02-27 NOTE — PROGRESS NOTES
Post Restraint and Seclusion Patient Debriefing    Did debriefing occur? yes,     If No, why not?  [] Patient refused  [] Patient is unavailable for debriefing due to:  [] Patient debriefing not completed due to clinical contraindication of:    What events led to the seclusion/restraint incident?  Patient discharge focused, rapid cycling, agitated, and unable to be redirected.     Did being restrained or in seclusion help you regain control of your behavior? yes,       Did you feel safe while you were in restraint or seclusion:      [] Very Safe  [x] Safe  [] Somewhat Safe  [] Not Safe     Did you have the chance to gain control of your behavior before you were secluded or restrained? yes,     If Yes, how:    [x] Offered Medication  [x] Talked with  [x] Given Time Out      [x] Offered alternatives to restraint/seclusion     During the restraint or seclusion process were you offered medicine to help you gain control? yes,       Were your physical and emotional needs met, and your privacy rights addressed while you were in restraints/seclusion? yes      How can we assist you in remaining restraint or seclusion free in the future? N/a      Is there anything else you would like to share regarding this restraint/seclusion episode?    Patient consented to family or significant other to participate in debriefing no    Patient's guardian participated in debriefing (when applicable) no    Patient's guardian unable to participate in debriefing (when applicable) no    Staff:  Were modifications to the treatment plan completed? yes,

## 2025-02-27 NOTE — PROGRESS NOTES
Spiritual Health History and Assessment/Progress Note  [facility]    (P) Crisis, (P) Code (comment) (Show of Support),  ,      Name: Ana Puente MRN: [id number]    Age: 39 y.o.     Sex: female   Language: English   Episcopalian: Jainism   Acute psychosis (HCC)     Date: 2/27/2025            Total Time Calculated: (P) 12 min              Spiritual Assessment began in [facility] ADULT [facility]E        Referral/Consult From: (P) Nursing Supervisor/Manager   Encounter Overview/Reason: (P) Crisis  Service Provided For: (P) Patient not available (Careteam as they worked with patient)    Krystin, Belief, Meaning:   Patient unable to assess at this time  Family/Friends No family/friends present      Importance and Influence:  Patient unable to assess at this time  Family/Friends No family/friends present    Community:  Patient Other: Unable to assess; show of support called upon patient  Family/Friends No family/friends present    Assessment and Plan of Care:     Patient Interventions include: Other:  provided presence and silent prayer in hallway, for careteam and patient.  Family/Friends Interventions include: No family/friends present    Patient Plan of Care: Spiritual Care available upon further referral  Family/Friends Plan of Care: No family/friends present    Electronically signed by Chaplain Lex on 2/27/2025 at 10:53 AM
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
## 2025-02-27 NOTE — FLOWSHEET NOTE
Pt continues to pace, rambling speech, flight of ideas,requesting her rights as a patient, papers with her rights given to her and explained, pt states she understands these rights and understands [facility], but then continues to ask for explanations and denies the need to be in the hospital. Oral medications offered but refuses to take oral meds. Agreed to have vital signs taken but refused to answer any admission questions. Pt unable to be redirected, has racing thoughts, increased irritability, pressured speech..

## 2025-02-27 NOTE — PROGRESS NOTES
Pt awake in room, agreeable to take oral medications as ordered. Walked out to dining room for lunch tray. Denies needs at present. Pleasant and cooperative.

## 2025-02-27 NOTE — PROGRESS NOTES
Patients  Polo approached nurses desk and speak with writer to provide collateral information about patient. Per Polo \" Ana had her stroke 3 years ago, after that she had some physical deficits like weakness and foot drop but we worked through that in therapy. She was still her self, sharp as a tack, happy go [place] and loving. In July she started to have GI issues and was unable to eat we saw doctors here, at [facility], and [facility]. That's when we found out she has celiacs. It was so bad she had to have TPN with a PICC line. When we moved into our new home in October she woke up sweating, she was soaked like she had been in the bath with clothes on and was confused and disoriented. Yan was taken by ambulance to [facility] and we thought she had another stroke because she had a lot of the same symptoms. They referred us to a neurologist but didn't do any testing.Since then she has started forgetting things, she's confused about the date some days, forgets appointments, and is angry. Its been real bad since mid December she woke up confused and the symptoms have been more severe. I have been staying at my parents some nights and checking on her because it gets so bad. I didn't want this for my wife we thought she would be medically admitted to get help.\" Patients  informed that writer would pass this on to Alex SINGH and would inform her that he would like to speak with her and/or Dr. Stark. Patients  can be reached at [phone].

## 2025-02-27 NOTE — PROGRESS NOTES
Behavioral Services  Medicare Certification Upon Admission    I certify that this patient's inpatient psychiatric hospital admission is medically necessary for:    [x] (1) Treatment which could reasonably be expected to improve this patient's condition,       [x] (2) Or for diagnostic study;     AND     [x](2) The inpatient psychiatric services are provided while the individual is under the care of a physician and are included in the individualized plan of care.    Estimated length of stay/service 3-5 days    Plan for post-hospital care hc    Electronically signed by Ysabel Romero MD on 2/27/2025 at 9:04 AM

## 2025-02-27 NOTE — PROGRESS NOTES
Behavioral Health   Admission Note   Admission Type: Involuntary    Reason for Admission: psychosis    Patient Strengths/Barriers  Strengths (Must Choose Two): Other (comment) (GADIEL)  Barriers: Other (comment) (GADIEL)    Addictive Behavior  In the Past 3 Months, Have You Felt or Has Someone Told You That You Have a Problem With  : Other (comment) (GADIEL)    Medical Problems:   Past Medical History:   Diagnosis Date    Anxiety     Celiac sprue     Paralysis gastric     Paralysis of colon (HCC)     Pneumonia 2010    Superior mesenteric artery syndrome     Uncomplicated severe persistent asthma (HCC) 11/28/2016       Status EXAM:  Mental Status and Behavioral Exam  Normal: No  Level of Assistance: Independent/Self  Facial Expression: Exaggerated, Euphoric  Affect: Inappropriate, Unstable  Level of Consciousness: Confused  Frequency of Checks: 4 times per hour, close  Mood:Normal: No  Mood: Suspicious, Angry, Euphoric, Irritable  Motor Activity:Normal: No  Motor Activity: Increased  Eye Contact: Poor  Observed Behavior: Agitated, Hostile, Hypermobile, Preoccupied  Sexual Misconduct History: Current - no  Preception: [place] to person, B[place] to place, [place] to time  Attention:Normal: No  Attention: Hyperalert, Distractible, Unable to concentrate  Thought Processes: Flight of ideas, Loose association  Thought Content:Normal: No  Thought Content: Obsessions, Paranoia, Preoccupations, Poverty of content  Depression Symptoms: Change in energy level  Anxiety Symptoms: Generalized  Vannesa Symptoms: Flight of ideas, Increased energy, Poor judgment, Pressured speech, Psychomotor agitation, Rapid cycling  Hallucinations: Unable to assess  Delusions: Yes  Delusions: Controlling, Paranoid  Memory:Normal: No  Memory: Confabulation, Poor recent  Insight and Judgment: No  Insight and Judgment: Poor judgment, Poor insight, Unrealistic    Pt admitted with followings belongings:  Dental Appliances: None  Vision - Corrective Lenses: None

## 2025-02-28 PROCEDURE — 1240000000 HC EMOTIONAL WELLNESS R&B

## 2025-02-28 PROCEDURE — APPSS30 APP SPLIT SHARED TIME 16-30 MINUTES: Performed by: PHYSICIAN ASSISTANT

## 2025-02-28 PROCEDURE — 90833 PSYTX W PT W E/M 30 MIN: CPT | Performed by: PSYCHIATRY & NEUROLOGY

## 2025-02-28 PROCEDURE — 6370000000 HC RX 637 (ALT 250 FOR IP): Performed by: PSYCHIATRY & NEUROLOGY

## 2025-02-28 PROCEDURE — 99232 SBSQ HOSP IP/OBS MODERATE 35: CPT | Performed by: PSYCHIATRY & NEUROLOGY

## 2025-02-28 PROCEDURE — GZ56ZZZ INDIVIDUAL PSYCHOTHERAPY, SUPPORTIVE: ICD-10-PCS | Performed by: PSYCHIATRY & NEUROLOGY

## 2025-02-28 PROCEDURE — 6370000000 HC RX 637 (ALT 250 FOR IP): Performed by: PHYSICIAN ASSISTANT

## 2025-02-28 RX ORDER — HYDROXYZINE PAMOATE 25 MG/1
50 CAPSULE ORAL 3 TIMES DAILY PRN
Status: DISCONTINUED | OUTPATIENT
Start: 2025-02-28 | End: 2025-03-03 | Stop reason: HOSPADM

## 2025-02-28 RX ORDER — DIVALPROEX SODIUM 500 MG/1
500 TABLET, FILM COATED, EXTENDED RELEASE ORAL 2 TIMES DAILY
Status: DISCONTINUED | OUTPATIENT
Start: 2025-02-28 | End: 2025-03-03 | Stop reason: HOSPADM

## 2025-02-28 RX ADMIN — NICOTINE POLACRILEX 2 MG: 2 LOZENGE ORAL at 14:53

## 2025-02-28 RX ADMIN — OLANZAPINE 5 MG: 5 TABLET, FILM COATED ORAL at 20:53

## 2025-02-28 RX ADMIN — ACETAMINOPHEN 650 MG: 325 TABLET ORAL at 15:11

## 2025-02-28 RX ADMIN — OLANZAPINE 5 MG: 5 TABLET, FILM COATED ORAL at 08:53

## 2025-02-28 RX ADMIN — CLOPIDOGREL BISULFATE 75 MG: 75 TABLET ORAL at 08:53

## 2025-02-28 RX ADMIN — DIVALPROEX SODIUM 500 MG: 500 TABLET, EXTENDED RELEASE ORAL at 20:53

## 2025-02-28 RX ADMIN — ATORVASTATIN CALCIUM 40 MG: 40 TABLET, FILM COATED ORAL at 08:54

## 2025-02-28 RX ADMIN — TIZANIDINE 8 MG: 4 TABLET ORAL at 08:53

## 2025-02-28 RX ADMIN — ACETAMINOPHEN 650 MG: 325 TABLET ORAL at 09:28

## 2025-02-28 ASSESSMENT — PAIN DESCRIPTION - LOCATION: LOCATION: ABDOMEN;HEAD

## 2025-02-28 ASSESSMENT — PAIN SCALES - GENERAL
PAINLEVEL_OUTOF10: 0
PAINLEVEL_OUTOF10: 5

## 2025-02-28 ASSESSMENT — PAIN DESCRIPTION - ORIENTATION: ORIENTATION: INNER

## 2025-02-28 ASSESSMENT — PAIN - FUNCTIONAL ASSESSMENT: PAIN_FUNCTIONAL_ASSESSMENT: ACTIVITIES ARE NOT PREVENTED

## 2025-02-28 ASSESSMENT — PAIN DESCRIPTION - DESCRIPTORS: DESCRIPTORS: STABBING

## 2025-02-28 NOTE — BH NOTE
This RN has reviewed and agrees with FIDE Iyer LPN's data collection and has collaborated with this LPN regarding the patient's care plan.

## 2025-02-28 NOTE — PROGRESS NOTES
Spiritual Health History and Assessment/Progress Note  [facility]    (P) Crisis, Spiritual/Emotional Needs, Behavioral Health, (P)  (Show of support),  ,      Name: Ana Puente MRN: [id number]    Age: 39 y.o.     Sex: female   Language: English   Amish: Presybeterian   Acute psychosis (HCC)     Date: 2/28/2025            Total Time Calculated: (P) 45 min              Spiritual Assessment continued in [facility] ADULT PSYCH 7E        Referral/Consult From: (P) Nursing Supervisor/Manager   Encounter Overview/Reason: (P) Crisis, Spiritual/Emotional Needs, Behavioral Health  Service Provided For: (P) Patient    Krystin, Belief, Meaning:   Patient identifies as spiritual, is connected with a krystin tradition or spiritual practice, and has beliefs or practices that help with coping during difficult times  Family/Friends No family/friends present      Importance and Influence:  Patient has spiritual/personal beliefs that influence decisions regarding their health and Other: The patient was exploring different faiths and thinking about each.   Family/Friends No family/friends present    Community:  Patient is connected with a spiritual community and feels well-supported. Support system includes: Spouse/Partner, Parent/s, Children, Krystin Community, Friends, Extended family, and Other: The patient shared at various times that she is supported by her Faith, family and others.   Family/Friends No family/friends present    Assessment and Plan of Care:     Patient Interventions include: Facilitated expression of thoughts and feelings, Explored spiritual coping/struggle/distress, Engaged in theological reflection, Affirmed coping skills/support systems, Provided sacramental/Caodaism ritual, Facilitated life review and/ or legacy, and Other: The patient was responded to due to a show of support and consult. The patient shared her excitement related to krystin and religions. The patient's mind was a flury of

## 2025-02-28 NOTE — PROGRESS NOTES
Department of Psychiatry  Progress Note     Chief Complaint:  psychosis, manic behavior     PROGRESS:  Jeanie PAYNE spoke with patient's  Polo yesterday. Per Jeanie's note:  \"Patients  Polo approached nurses desk and speak with writer to provide collateral information about patient. Per Polo \" Ana had her stroke 3 years ago, after that she had some physical deficits like weakness and foot drop but we worked through that in therapy. She was still her self, sharp as a tack, happy go [place] and loving. In July she started to have GI issues and was unable to eat we saw doctors here, at [facility], and [facility]. That's when we found out she has celiacs. It was so bad she had to have TPN with a PICC line. When we moved into our new home in October she woke up sweating, she was soaked like she had been in the bath with clothes on and was confused and disoriented. She was taken by ambulance to [facility] and we thought she had another stroke because she had a lot of the same symptoms. They referred us to a neurologist but didn't do any testing. Since then she has started forgetting things, she's confused about the date some days, forgets appointments, and is angry. Its been real bad since mid December she woke up confused and the symptoms have been more severe. I have been staying at my parents some nights and checking on her because it gets so bad. I didn't want this for my wife we thought she would be medically admitted to get help.\" Patients  informed that writer would pass this on to Alex SINGH and would inform her that he would like to speak with her and/or Dr. Blanca. Patients  can be reached at [phone].\"     I did consult neurology yesterday given her 's concerns that this was not Ana's baseline and these behaviors seemed to be new for her over the last few months. Neurology recommended doing a MRI but asked for the consult to be cancelled since these
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
## 2025-02-28 NOTE — PROGRESS NOTES
Patient noted standing at nurses station refusing to redirect with peer Nurse, being loud, yelling and demanding to be discharged stating \"I should be able to sign myself out!\" . Show of support called. Patient given option of PO haldol/ativan for agitation or IM haldol/ativan for agitation. Patient chooses PO option and takes without incident.

## 2025-03-01 LAB — CK SERPL-CCNC: 121 U/L (ref 26–192)

## 2025-03-01 PROCEDURE — 99231 SBSQ HOSP IP/OBS SF/LOW 25: CPT | Performed by: NURSE PRACTITIONER

## 2025-03-01 PROCEDURE — 82550 ASSAY OF CK (CPK): CPT

## 2025-03-01 PROCEDURE — 36415 COLL VENOUS BLD VENIPUNCTURE: CPT

## 2025-03-01 PROCEDURE — 6370000000 HC RX 637 (ALT 250 FOR IP): Performed by: PHYSICIAN ASSISTANT

## 2025-03-01 PROCEDURE — 6370000000 HC RX 637 (ALT 250 FOR IP): Performed by: PSYCHIATRY & NEUROLOGY

## 2025-03-01 PROCEDURE — 1240000000 HC EMOTIONAL WELLNESS R&B

## 2025-03-01 RX ADMIN — OLANZAPINE 5 MG: 5 TABLET, FILM COATED ORAL at 08:16

## 2025-03-01 RX ADMIN — NICOTINE POLACRILEX 2 MG: 2 LOZENGE ORAL at 09:05

## 2025-03-01 RX ADMIN — PANTOPRAZOLE SODIUM 40 MG: 40 TABLET, DELAYED RELEASE ORAL at 08:16

## 2025-03-01 RX ADMIN — HYDROXYZINE PAMOATE 50 MG: 25 CAPSULE ORAL at 20:34

## 2025-03-01 RX ADMIN — ACETAMINOPHEN 650 MG: 325 TABLET ORAL at 15:50

## 2025-03-01 RX ADMIN — OLANZAPINE 5 MG: 5 TABLET, FILM COATED ORAL at 20:34

## 2025-03-01 RX ADMIN — ATORVASTATIN CALCIUM 40 MG: 40 TABLET, FILM COATED ORAL at 08:16

## 2025-03-01 RX ADMIN — TIZANIDINE 8 MG: 4 TABLET ORAL at 20:34

## 2025-03-01 RX ADMIN — DIVALPROEX SODIUM 500 MG: 500 TABLET, EXTENDED RELEASE ORAL at 20:34

## 2025-03-01 RX ADMIN — NICOTINE POLACRILEX 2 MG: 2 LOZENGE ORAL at 20:53

## 2025-03-01 RX ADMIN — NICOTINE POLACRILEX 2 MG: 2 LOZENGE ORAL at 15:50

## 2025-03-01 RX ADMIN — CLOPIDOGREL BISULFATE 75 MG: 75 TABLET ORAL at 08:16

## 2025-03-01 RX ADMIN — ACETAMINOPHEN 650 MG: 325 TABLET ORAL at 20:35

## 2025-03-01 RX ADMIN — ACETAMINOPHEN 650 MG: 325 TABLET ORAL at 08:15

## 2025-03-01 ASSESSMENT — PAIN DESCRIPTION - FREQUENCY: FREQUENCY: INTERMITTENT

## 2025-03-01 ASSESSMENT — PAIN DESCRIPTION - LOCATION
LOCATION: HEAD
LOCATION: ABDOMEN;SHOULDER

## 2025-03-01 ASSESSMENT — PAIN SCALES - GENERAL
PAINLEVEL_OUTOF10: 1
PAINLEVEL_OUTOF10: 4
PAINLEVEL_OUTOF10: 3

## 2025-03-01 ASSESSMENT — PAIN DESCRIPTION - DESCRIPTORS
DESCRIPTORS: ACHING;SPASM
DESCRIPTORS: ACHING

## 2025-03-01 ASSESSMENT — PAIN DESCRIPTION - PAIN TYPE: TYPE: ACUTE PAIN

## 2025-03-01 ASSESSMENT — PAIN DESCRIPTION - ORIENTATION: ORIENTATION: LEFT;RIGHT

## 2025-03-01 ASSESSMENT — PAIN DESCRIPTION - ONSET: ONSET: GRADUAL

## 2025-03-01 NOTE — PROGRESS NOTES
Psychiatry Progress Note      3-1-2025    CC: Psychosis; Manic Behavior                   Subjective    Progress:  Ana presents with some paranioia and elevated mood. Denies AH/VH. Tends to ramble with some flight of ideas. Denies feelings of harm towards self or others. Verified is taking Zyprexa but is refusing Depakote. Stating that is not her med even after being reassured Depakote is Rxed for her. States there is really nothing wrong with her. Reports appetite and sleep are \"ok\"  Verified slept 8.5 hours continuous. Denies going to groups. Denies getting any visits. But reports has talked to her brother on phone.     Objective  BP (!) 99/58   Pulse 63   Temp 97.9 °F (36.6 °C) (Tympanic)   Resp 14   Wt 45.4 kg (100 lb)   LMP 01/06/2010   SpO2 99%   BMI 19.53 kg/m²      MSE:  Level of consciousness: Alert  Appearance: hospital attire, in chair and fair grooming   Behavior/Motor: Calm   Attitude toward examiner: cooperative   Speech: Normal volume, tends to ramble   Mood: Elevated  Affect: Reactive  Thought processes: Some flight of ideas  Suicidal Ideation: Denies suicidal ideations  Homicidal ideation: Denies homicidal ideations  Delusions: Some paranoia   Perceptual Disturbance: Denies AH/VH  Cognition: Oriented to person, place, time   Concentration Poor   Memory intact   Insight: Limited  Judgment: Limited    Assessment:  Acute Psychosis     Plan:  Medications: continue current medications  Continue to encourage group participation   Attempt to develop insight  Psycho-education conducted.  Supportive Therapy conducted.  Probable discharge is TBD     Varun Raphael, CNP  3-1-2025

## 2025-03-01 NOTE — PATIENT CARE CONFERENCE
Behavioral Health   Day 3 Interdisciplinary Treatment Plan NOTE    Review Date & Time: 3/1/25 0928    Patient was not in treatment team    Admission Type:   Admission Type: Involuntary    Reason for admission:  Reason for Admission: psychosis  Estimated Length of Stay Update:  2-3 days  Estimated Discharge Date Update: 3/5/25    Patient Strengths/Barriers  Strengths (Must Choose Two): Other (comment) (GADIEL)  Barriers: Other (comment) (GADIEL)  Addictive Behavior:Addictive Behavior  In the Past 3 Months, Have You Felt or Has Someone Told You That You Have a Problem With  : Other (comment) (GADIEL)  Medical Problems:  Past Medical History:   Diagnosis Date    Anxiety     Celiac sprue     Paralysis gastric     Paralysis of colon (HCC)     Pneumonia 2010    Superior mesenteric artery syndrome     Uncomplicated severe persistent asthma (HCC) 11/28/2016       Risk:  Fall Risk   Tae Scale Tae Scale Score: 21    Status EXAM:   Mental Status and Behavioral Exam  Normal: No  Level of Assistance: Needs encouragement  Facial Expression: Flat, Hostile  Affect: Blunt, Constricted  Level of Consciousness: Somnolent  Frequency of Checks: 4 times per hour, close  Mood:Normal: No  Mood: Irritable, Suspicious  Motor Activity:Normal: No  Motor Activity: Decreased  Eye Contact: Poor  Observed Behavior: Withdrawn, Guarded  Sexual Misconduct History: Current - no  Preception: [place] to person, [place] to place  Attention:Normal: No  Attention: Others (comment) (GADIEL. Declines nursing assessment)  Thought Processes: Blocking (Declines nursing assessment)  Thought Content:Normal: No  Thought Content: Other (comment) (GADIEL. Declines nursing assessment)  Depression Symptoms: Isolative, Change in energy level  Anxiety Symptoms: Generalized, Other (comment) (GADIEL. Declines assessment)  Vannesa Symptoms:  (GADIEL. Declines nursing assessment.)  Hallucinations: Unable to assess (GADIEL. Declines nursing assessment.)  Delusions:  (GADIEL. Declines nursing

## 2025-03-01 NOTE — BH NOTE
Goal Wrap-Up/Relaxation Group    Date: 2/28/2025  Start Time: 2000  End Time:  2020    Type of Group: Goal Wrap Up    States that goal today was: No goal set.    Goal for today was No goal set today    Patient Did not participate in group/activities. Patient somnolent and in bed.       Signature: Adriane Wood LPN

## 2025-03-01 NOTE — PROGRESS NOTES
Pt's  Polo called and expressed his concerns regarding patient. Polo feels that Ana isn't getting the care she needs while being admitted on the unit. Polo feels like everything is getting pushed aside until Monday.  States if this hospital is unable to do anything for her then he would like her discharged so he can take her home and take care of her.  Polo feels that every time he comes to see her she is the same and is not making any progress. Polo states patient has lost her job do to her illness. Polo states \"I am beyond pissed that nothing is getting done\". Gave emotional support to Polo and advised that this nurse will pass on to have Dr Romero call him in the morning.

## 2025-03-01 NOTE — PROGRESS NOTES
KEESHA has reviewed and agrees with CHI Wood LPN's shift   Assessment.    Rosa Maldonado, RN  2/28/2025

## 2025-03-01 NOTE — BH NOTE
Patient in bed with eyes closed thus far this shift. Somnolent. Declines to participate in nursing assessment. Does allow vital signs and does accept PO scheduled medications per MAR.

## 2025-03-02 PROBLEM — H10.31 ACUTE BACTERIAL CONJUNCTIVITIS OF RIGHT EYE: Status: ACTIVE | Noted: 2025-03-02

## 2025-03-02 PROCEDURE — 6370000000 HC RX 637 (ALT 250 FOR IP): Performed by: PHYSICIAN ASSISTANT

## 2025-03-02 PROCEDURE — 6370000000 HC RX 637 (ALT 250 FOR IP): Performed by: PSYCHIATRY & NEUROLOGY

## 2025-03-02 PROCEDURE — 6370000000 HC RX 637 (ALT 250 FOR IP): Performed by: STUDENT IN AN ORGANIZED HEALTH CARE EDUCATION/TRAINING PROGRAM

## 2025-03-02 PROCEDURE — 1240000000 HC EMOTIONAL WELLNESS R&B

## 2025-03-02 PROCEDURE — 99231 SBSQ HOSP IP/OBS SF/LOW 25: CPT | Performed by: NURSE PRACTITIONER

## 2025-03-02 PROCEDURE — 99222 1ST HOSP IP/OBS MODERATE 55: CPT | Performed by: STUDENT IN AN ORGANIZED HEALTH CARE EDUCATION/TRAINING PROGRAM

## 2025-03-02 RX ORDER — POLYVINYL ALCOHOL 14 MG/ML
1 SOLUTION/ DROPS OPHTHALMIC EVERY 4 HOURS PRN
Status: DISCONTINUED | OUTPATIENT
Start: 2025-03-02 | End: 2025-03-02

## 2025-03-02 RX ORDER — CIPROFLOXACIN HYDROCHLORIDE 3.5 MG/ML
1 SOLUTION/ DROPS TOPICAL 4 TIMES DAILY
Status: DISCONTINUED | OUTPATIENT
Start: 2025-03-02 | End: 2025-03-02

## 2025-03-02 RX ORDER — CIPROFLOXACIN HYDROCHLORIDE 3.5 MG/ML
1 SOLUTION/ DROPS TOPICAL 4 TIMES DAILY
Status: DISCONTINUED | OUTPATIENT
Start: 2025-03-02 | End: 2025-03-03 | Stop reason: HOSPADM

## 2025-03-02 RX ORDER — CIPROFLOXACIN HYDROCHLORIDE 3.5 MG/ML
1 SOLUTION/ DROPS TOPICAL 4 TIMES DAILY
Status: DISCONTINUED | OUTPATIENT
Start: 2025-03-02 | End: 2025-03-02 | Stop reason: SDUPTHER

## 2025-03-02 RX ADMIN — NICOTINE POLACRILEX 2 MG: 2 LOZENGE ORAL at 09:14

## 2025-03-02 RX ADMIN — DIVALPROEX SODIUM 500 MG: 500 TABLET, EXTENDED RELEASE ORAL at 21:26

## 2025-03-02 RX ADMIN — POLYETHYLENE GLYCOL 400 AND PROPYLENE GLYCOL 1 DROP: 4; 3 SOLUTION/ DROPS OPHTHALMIC at 21:29

## 2025-03-02 RX ADMIN — OLANZAPINE 5 MG: 5 TABLET, FILM COATED ORAL at 21:27

## 2025-03-02 RX ADMIN — ALUMINUM HYDROXIDE, MAGNESIUM HYDROXIDE, AND SIMETHICONE 30 ML: 200; 200; 20 SUSPENSION ORAL at 09:20

## 2025-03-02 RX ADMIN — NICOTINE POLACRILEX 2 MG: 2 LOZENGE ORAL at 16:11

## 2025-03-02 RX ADMIN — OLANZAPINE 5 MG: 5 TABLET, FILM COATED ORAL at 09:14

## 2025-03-02 RX ADMIN — TIZANIDINE 8 MG: 4 TABLET ORAL at 21:27

## 2025-03-02 RX ADMIN — ATORVASTATIN CALCIUM 40 MG: 40 TABLET, FILM COATED ORAL at 09:13

## 2025-03-02 RX ADMIN — HYDROXYZINE PAMOATE 50 MG: 25 CAPSULE ORAL at 21:26

## 2025-03-02 RX ADMIN — CLOPIDOGREL BISULFATE 75 MG: 75 TABLET ORAL at 09:13

## 2025-03-02 RX ADMIN — ACETAMINOPHEN 650 MG: 325 TABLET ORAL at 16:06

## 2025-03-02 RX ADMIN — ACETAMINOPHEN 650 MG: 325 TABLET ORAL at 09:14

## 2025-03-02 RX ADMIN — NICOTINE POLACRILEX 2 MG: 2 LOZENGE ORAL at 21:36

## 2025-03-02 ASSESSMENT — PAIN DESCRIPTION - LOCATION
LOCATION: ABDOMEN;PELVIS
LOCATION: ABDOMEN

## 2025-03-02 ASSESSMENT — PAIN DESCRIPTION - DESCRIPTORS
DESCRIPTORS: ACHING
DESCRIPTORS: TIGHTNESS;SPASM

## 2025-03-02 ASSESSMENT — PAIN DESCRIPTION - ORIENTATION
ORIENTATION: LOWER
ORIENTATION: MID

## 2025-03-02 ASSESSMENT — PAIN SCALES - GENERAL
PAINLEVEL_OUTOF10: 3
PAINLEVEL_OUTOF10: 3
PAINLEVEL_OUTOF10: 0

## 2025-03-02 ASSESSMENT — PAIN DESCRIPTION - PAIN TYPE: TYPE: ACUTE PAIN

## 2025-03-02 NOTE — H&P
spouse is talking and pt redirected back to assessment questions.   This writer notes that she was going to speak with doctors and will get back to her on plan. Pt then begins talking again about medical issues.   COLLATERAL Spouse Polo Cuellar notes that pt had a stroke three years ago and has had some recent medical problems but notes he is unsure of all of them. He does state she lost a lot of weight. He states that pt began deteriorating in October, and the mid December she really started to decline fast. He states that she believes everyone is stealing from her as she will often misplace things and cannot remember where they are. He states that she was just standing in the HVAC closet when he was at the house. He notes that she stated she lost her wedding ring, and he found it in a file of rocks. He states that she has told him that she hears voices through the vents. He states that she doesn't know the day and time often. He states that she has fallen, Polo stating he has seen bruises on her. He reports that the house is a mess, everything is scattered everywhere. He states that he was unable to stay there because he had to work and while he would be there she would yell at him nonstop. He states that he would not be able to sleep due to pt constantly talking and pacing. He states that he now comes to check on her daily.  Polo states they have been  6 years and he has never seen pt like this before, nor has her son. Polo indicates pt was the nicest person, an  like himself, and recently has not been anything close to herself. He states that she has also been increasingly aggressive, yelling and screaming at him, even grabbing at him.   He states that she had short term disability from her job, and at the beginning of the year he would schedule all her appointments and she would miss them due to her altered state, and ended up losing it. He states that she then kept telling him 
MD Donte on    02/27/2025 09:55 PM        MRI BRAIN WO CONTRAST    Result Date: 2/27/2025  MR Brain without gadolinium Comparison: CT/SR - CT HEAD WO CONTRAST - 9/29/24 15:51 EDT Findings: Focal region of T2/FLAIR signal hyperintensity along the posterior right corona radiata extending inferiorly near the thalamocapsular region. No restricted diffusion. No evidence of infarcts or acute intracranial hemorrhage. No mass effect or midline shift. Vascular flow voids are intact. The orbits are normal. The sinuses and mastoid air cells are clear. No focal bone lesion. Empty sella is demonstrated, nonspecific.     1. No acute intracranial abnormality. 2. Focal increased T2/FLAIR signal in the right cerebral white matter. Findings are broad including demyelination and are nonspecific. Recommend clinical correlation and follow-up as warranted. This document has been electronically signed by: Cedric Kent MD on 02/27/2025 09:55 PM        Electronically signed by Maximo Good DO on 3/2/2025 at 10:52 AM

## 2025-03-02 NOTE — BH NOTE
Goal Wrap-Up/Relaxation Group    Date: 3/1/2025  Start Time: 1930  End Time:  2010    Type of Group: Relaxation    Patient Did not participate in group/activities      Signature: Corie Arteaga RN

## 2025-03-02 NOTE — PROGRESS NOTES
Psychiatry Progress Note                                                  3-2-2025     CC: Psychosis; Manic Behavior                                                                          Subjective     Progress:  Ana presents with some irritability today. Remains with paranioia and some mood elevation but less. Still has some flight of ideas. But speech is less rambling. Denies AH/VH.  Denies feelings of harm towards self or others. Verified isstill  taking Zyprexa but is still refusing Depakote. Stating again  that is not her med even after being reassured Depakote is Rxed for her. States there is  nothing wrong with her. Reports appetite and sleep are still \"alright\"  Verified slept 8 hours and woke once.  Denies going to groups. Reports  visited yesterday. And states she sent him away. Staff reports when Pt saw  sent her into a rage. And tood a while for her to calm down.  did bring her glasses to her.    Ana appears to have a conjunctivitis right eye possibly due to contact lense will counsult hospitalist.      Objective  /89   Pulse 84   Temp 97.4 °F (36.3 °C) (Tympanic)   Resp 18   Wt 45.4 kg (100 lb)   LMP 01/06/2010   SpO2 100%   BMI 19.53 kg/m²       MSE:  Level of consciousness: Alert  Appearance: hospital attire, in chair and fair grooming   Behavior/Motor: Calm   Attitude toward examiner: cooperative   Speech: Normal volume, less rambling   Mood: Irritable; some elevation but less.   Affect: Reactive  Thought processes: Some flight of ideas  Suicidal Ideation: Denies suicidal ideations  Homicidal ideation: Denies homicidal ideations  Delusions: Some paranoia   Perceptual Disturbance: Denies AH/VH  Cognition: Oriented to person, place, time   Concentration Poor   Memory intact   Insight: Limited  Judgment: Limited     Assessment:  Acute Psychosis      Plan:  Consult hospitalist for possible conjunctivitis right eye   Medications: continue current

## 2025-03-02 NOTE — PROGRESS NOTES
Patient is compliant and accepting of nursing assessment this shift. Accepts both Depakote and Zyprexa as ordered, but requires encouragement. Pressured, rambled speech noted with tangential and referential delusional thought processes. Is seen working non-sense mathematic equations on white board, states she's calculating \"the dates of the piece of shit when my teeth should be fixed but weren't\". Is seen vigorously rubbing right eye which is swollen with very reddened sclera. No discharge noted or acute vision changes reported. Patient reports eye is tender and voices concern for conjunctivitis. Verified that contact lenses have been removed and educated patient to only utilize eyeglasses and not to rub or further irritate eye. Assisted to flush with NS x3 and applied cool compress which patient reported provided relief and did seem to reduce redness.

## 2025-03-02 NOTE — PROGRESS NOTES
KEESHA has reviewed and agrees with CHI Wood LPN's shift   Assessment.    Rosa Maldonado, RN  3/2/2025

## 2025-03-03 VITALS
TEMPERATURE: 98.4 F | BODY MASS INDEX: 19.53 KG/M2 | RESPIRATION RATE: 15 BRPM | DIASTOLIC BLOOD PRESSURE: 94 MMHG | WEIGHT: 100 LBS | SYSTOLIC BLOOD PRESSURE: 135 MMHG | HEART RATE: 79 BPM | OXYGEN SATURATION: 98 %

## 2025-03-03 PROCEDURE — 99239 HOSP IP/OBS DSCHRG MGMT >30: CPT | Performed by: PSYCHIATRY & NEUROLOGY

## 2025-03-03 PROCEDURE — 6370000000 HC RX 637 (ALT 250 FOR IP): Performed by: PSYCHIATRY & NEUROLOGY

## 2025-03-03 PROCEDURE — 6370000000 HC RX 637 (ALT 250 FOR IP): Performed by: PHYSICIAN ASSISTANT

## 2025-03-03 PROCEDURE — 5130000000 HC BRIDGE APPOINTMENT

## 2025-03-03 RX ORDER — CIPROFLOXACIN HYDROCHLORIDE 3.5 MG/ML
1 SOLUTION/ DROPS TOPICAL 4 TIMES DAILY
Qty: 2.5 ML | Refills: 0 | Status: SHIPPED | OUTPATIENT
Start: 2025-03-03 | End: 2025-03-09

## 2025-03-03 RX ORDER — OLANZAPINE 5 MG/1
5 TABLET ORAL 2 TIMES DAILY
Qty: 60 TABLET | Refills: 0 | Status: SHIPPED | OUTPATIENT
Start: 2025-03-03

## 2025-03-03 RX ORDER — NICOTINE 21 MG/24HR
1 PATCH, TRANSDERMAL 24 HOURS TRANSDERMAL DAILY
Status: DISCONTINUED | OUTPATIENT
Start: 2025-03-03 | End: 2025-03-03 | Stop reason: HOSPADM

## 2025-03-03 RX ORDER — DIVALPROEX SODIUM 500 MG/1
500 TABLET, FILM COATED, EXTENDED RELEASE ORAL 2 TIMES DAILY
Qty: 60 TABLET | Refills: 0 | Status: SHIPPED | OUTPATIENT
Start: 2025-03-03

## 2025-03-03 RX ORDER — HYDROXYZINE PAMOATE 50 MG/1
50 CAPSULE ORAL 3 TIMES DAILY PRN
Qty: 30 CAPSULE | Refills: 0 | Status: SHIPPED | OUTPATIENT
Start: 2025-03-03 | End: 2025-03-13

## 2025-03-03 RX ADMIN — DIVALPROEX SODIUM 500 MG: 500 TABLET, EXTENDED RELEASE ORAL at 08:51

## 2025-03-03 RX ADMIN — PANTOPRAZOLE SODIUM 40 MG: 40 TABLET, DELAYED RELEASE ORAL at 08:51

## 2025-03-03 RX ADMIN — CLOPIDOGREL BISULFATE 75 MG: 75 TABLET ORAL at 08:51

## 2025-03-03 RX ADMIN — OLANZAPINE 5 MG: 5 TABLET, FILM COATED ORAL at 08:51

## 2025-03-03 RX ADMIN — ALUMINUM HYDROXIDE, MAGNESIUM HYDROXIDE, AND SIMETHICONE 30 ML: 200; 200; 20 SUSPENSION ORAL at 01:59

## 2025-03-03 RX ADMIN — ACETAMINOPHEN 650 MG: 325 TABLET ORAL at 03:12

## 2025-03-03 ASSESSMENT — PAIN SCALES - WONG BAKER: WONGBAKER_NUMERICALRESPONSE: NO HURT

## 2025-03-03 ASSESSMENT — PAIN DESCRIPTION - DESCRIPTORS: DESCRIPTORS: ACHING

## 2025-03-03 ASSESSMENT — PAIN SCALES - GENERAL
PAINLEVEL_OUTOF10: 3
PAINLEVEL_OUTOF10: 0

## 2025-03-03 ASSESSMENT — PAIN - FUNCTIONAL ASSESSMENT
PAIN_FUNCTIONAL_ASSESSMENT: ACTIVITIES ARE NOT PREVENTED
PAIN_FUNCTIONAL_ASSESSMENT: PREVENTS OR INTERFERES SOME ACTIVE ACTIVITIES AND ADLS

## 2025-03-03 ASSESSMENT — PAIN DESCRIPTION - LOCATION: LOCATION: HEAD

## 2025-03-03 ASSESSMENT — PAIN DESCRIPTION - ORIENTATION: ORIENTATION: MID

## 2025-03-03 NOTE — PROGRESS NOTES
2:41 pm: I called patient's  Polo to provide him with an update on the patient.  Polo said he is hoping to get better news today. He said he \"goes there and nothing has been done.\" He said his wife never remembers when he shows up. He then said the nurses have been nice to him but he just wants an update on what's going on.  I updated Polo on Ana's progress and informed him we were planning for discharge tomorrow. I also informed him that neurology did not feel Ana's symptoms were acute enough to warrant a consult evaluation from their end. I also shared they reviewed her MRI and again did not see enough concern from the MRI to come see her here.   He said he wishes there was more tests done. He said that she does not have a neurology appointment until the end of April. He was not sure who the neurologist was.  He then asked for her to be discharged today. He stated that he has been driving all over the place to work, visit Ana and to let their dogs out so he would just like to take her home with him today instead of tomorrow. He seemed annoyed when talking about this.   He said that he did not know how it was going to work taking her home. I encouraged him to take her back to the ED if symptoms worsened. He then verbalized concern that she would not be receptive to going to the ED after everything that happened    2:36pm: call to Dr Romero to inform him of Polo's request for Ana to be discharged today. He was okay with discharge today instead of tomorrow morning.     2:50pm: Call back to Polo to inform him of this.

## 2025-03-03 NOTE — DISCHARGE INSTRUCTIONS
[facility] Hotline:  [phone]    Crisis phone numbers:  [facility], and [facility] [phone].  [facility], and [facility] [phone]  [facility] [place] [phone].  [facility] [phone].  [facility] [phone].  [facility], [facility] [phone].    [facility]  [address] [place], [place] [address]  [phone]    [facility]  [address]  [address], [place] [address]  [place], [place] [id number] [phone]    [facility]  [address] [address] [address] [facility] [place] [address]  [phone]    [facility]  [address] [place] [address]  [phone]    [facility]  [address] [place] [address]  [phone]    [facility]  [address], [place] [id number] [phone]    [facility]  [address] [place] [address]  [phone]    [facility]  Rec[facility]  [address] [place], [place] [id number] [phone]    [facility]  [address] [address]  [phone]    [address] [address]  [phone]    [facility]  [address] [place], [place] [address] [phone]    [facility]  [address] [place] [address]  [phone]    [facility]  [address] [place], [place] [address] [phone]

## 2025-03-03 NOTE — PROGRESS NOTES
Discharge Planning-This clinician called [facility]. Maia was out of the office for the day. Voicemail left for Lorena requesting a return call.

## 2025-03-03 NOTE — DISCHARGE INSTR - DIET

## 2025-03-03 NOTE — BH NOTE
Goal Wrap-Up/Relaxation Group    Date: 3/2/2025  Start Time: 2000  End Time:  2020    Type of Group: Relaxation    States that goal today was: No goal stated    Goal for today was No goal set today    Patient Participated in group/activities appropriately      Signature: Adriane Wood LPN

## 2025-03-03 NOTE — PROGRESS NOTES
Discharge Planning-Ana is to go to [facility] for an appointment on March 6, 2025 at 3:00 pm. Ana is to arrive for her appointment at 2:30 pm to complete paperwork.

## 2025-03-03 NOTE — TRANSITION OF CARE
Behavioral Health Transition Record    Patient Name: Ana Puente  YOB: 1985   Medical Record Number: [id number]  Date of Admission: 2/27/2025  8:44 AM   Date of Discharge: 3/3/25    Attending Provider: Ysabel Romero,*   Discharging Provider: Dr. Romero  To contact this individual call [phone] and ask the  to page.  If unavailable, ask to be transferred to Behavioral Health Provider on call.  A Behavioral Health Provider will be available on call 24/7 and during holidays.    Primary Care Provider: Usha Amaya MD    Allergies   Allergen Reactions    Latex Itching and Rash    Metoclopramide Other (See Comments)     Other Reaction(s): Other (See Comments), Other: See Comments    Parkinsonism     Parkinsonism       Parkinsonism    Parkinsonism    Peanuts [Peanut Oil] Shortness Of Breath     Throat swells    Cat Dander      sneezing    Gluten Meal     Imitrex [Sumatriptan] Other (See Comments)     Heart races    Toradol [Ketorolac Tromethamine] Hives    Tramadol Rash       Reason for Admission: Acute Psychosis    Admission Diagnosis: Acute psychosis (HCC) [F23]    * No surgery found *    Results for orders placed or performed during the hospital encounter of 02/27/25   CK   Result Value Ref Range    Total  26 - 192 U/L       Immunizations administered during this encounter:   Immunization History   Administered Date(s) Administered    COVID-19, PFIZER Bivalent, DO NOT Dilute, (age 12y+), IM, 30 mcg/0.3 mL 11/23/2022    COVID-19, PFIZER PURPLE top, DILUTE for use, (age 12 y+), 30mcg/0.3mL 09/03/2021, 09/24/2021    Influenza Vaccine, unspecified formulation 10/01/2016    Influenza Virus Vaccine 12/01/2013    Influenza, AFLURIA (age 3 y+), FLUZONE, (age 6 mo+), Quadv MDV, 0.5mL 10/24/2017, 09/11/2019    Pneumococcal Conjugate 7-valent (Prevnar7) 01/01/2010    Pneumococcal, PPSV23, PNEUMOVAX 23, (age 2y+), SC/IM, 0.5mL 10/24/2017    TDaP, ADACEL (age 10y-64y), BOOSTRIX

## 2025-03-03 NOTE — PLAN OF CARE
Problem: Behavior  Goal: Pt/Family maintain appropriate behavior and adhere to behavioral management agreement, if implemented  Description: INTERVENTIONS:  1. Assess patient/family's coping skills and  non-compliant behavior (including use of illegal substances)  2. Notify security of behavior or suspected illegal substances which indicate the need for search of the family and/or belongings  3. Encourage verbalization of thoughts and concerns in a socially appropriate manner  4. Utilize positive, consistent limit setting strategies supporting safety of patient, staff and others  5. Encourage participation in the decision making process about the behavioral management agreement  6. If a visitor's behavior poses a threat to safety call refer to organization policy.  7. Initiate consult with , Psychosocial CNS, Spiritual Care as appropriate  Outcome: Not Progressing  Note: Patient agitated, angry, demanding, irritable, non- compliant, uncooperative      Problem: Involuntary Admit  Goal: Will cooperate with staff recommendations and doctor's orders and will demonstrate appropriate behavior  Description: INTERVENTIONS:  1. Treat underlying conditions and offer medication as ordered  2. Educate regarding involuntary admission procedures and rules  3. Contain excessive/inappropriate behavior per unit and hospital policies  Outcome: Not Progressing     Problem: Vannesa  Goal: Will exhibit normal sleep and speech and no impulsivity  Description: INTERVENTIONS:  1. Administer medication as ordered  2. Set limits on impulsive behavior  3. Make attempts to decrease external stimuli as possible  Outcome: Not Progressing  Note: Hyper verbal, impulsive        Problem: Safety - Violent/Self-destructive Restraint  Goal: Remains free of injury from restraints (Restraint for Violent/Self-Destructive Behavior)  Description: INTERVENTIONS:  1. Determine that de-escalation and other, less restrictive measures have been 
  Problem: Pain  Goal: Verbalizes/displays adequate comfort level or baseline comfort level  3/3/2025 1243 by Cheli Raphael RN  Outcome: Progressing  Flowsheets (Taken 3/3/2025 0758)  Verbalizes/displays adequate comfort level or baseline comfort level:   Encourage patient to monitor pain and request assistance   Assess pain using appropriate pain scale  Note: Patient denies pain so far this shift.       Problem: Risk for Elopement  Goal: Patient will not exit the unit/facility without proper excort  3/3/2025 1243 by Cheli Raphael RN  Outcome: Progressing  Flowsheets (Taken 3/3/2025 0758)  Nursing Interventions for Elopement Risk:   Make sure patient has all necessary personal care items   Reduce environmental triggers  Note: Patient has not been observed to be checking the exit doors or demonstrating behaviors of attempting to leave the unit.       Problem: ABCDS Injury Assessment  Goal: Absence of physical injury  3/3/2025 1243 by Cheli Raphael RN  Outcome: Progressing  Flowsheets (Taken 3/3/2025 1239)  Absence of Physical Injury: Implement safety measures based on patient assessment     Problem: Anxiety  Goal: Will report anxiety at manageable levels  Description: INTERVENTIONS:  1. Administer medication as ordered  2. Teach and rehearse alternative coping skills  3. Provide emotional support with 1:1 interaction with staff  3/3/2025 1243 by Cheli Raphael RN  Outcome: Progressing  Flowsheets  Taken 3/3/2025 1239  Will report anxiety at manageable levels:   Administer medication as ordered   Teach and rehearse alternative coping skills  Taken 3/3/2025 0758  Will report anxiety at manageable levels:   Teach and rehearse alternative coping skills   Provide emotional support with 1:1 interaction with staff  Note: Patient denies anxiety so far this shift.       Problem: Behavior  Goal: Pt/Family maintain appropriate behavior and adhere to behavioral management agreement, if implemented  Description: 
  Problem: Safety - Violent/Self-destructive Restraint  Goal: Remains free of injury from restraints (Restraint for Violent/Self-Destructive Behavior)  Description: INTERVENTIONS:  1. Determine that de-escalation and other, less restrictive measures have been tried or would not be effective before applying the restraint  2. Identify and document the criteria for restraint  3. Evaluate the patient's condition at the time of restraint application  4. Inform patient/family regarding the reason for restraint/seclusion  5. Q2H: Monitor comfort, nutrition and hydration needs  6. Q15M: Perform safety checks including skin, circulation, sensory, respiratory and psychological status  7. Ensure continuous observation  8. Identify and implement measures to help patient regain control, assess readiness for release and initiate progressive release per policy  2/28/2025 1105 by Karin Stanton, RN  Outcome: Completed  Note: No restraints needed at this time during shift.     Problem: Pain  Goal: Verbalizes/displays adequate comfort level or baseline comfort level  2/28/2025 2147 by Adriane Wood LPN  Outcome: Progressing  Flowsheets (Taken 2/28/2025 2147)  Verbalizes/displays adequate comfort level or baseline comfort level:   Encourage patient to monitor pain and request assistance   Assess pain using appropriate pain scale  Note: Denied pain when asked  2/28/2025 1105 by Karin Stanton, RN  Outcome: Progressing  Flowsheets (Taken 2/28/2025 0151 by Abi Iyer LPN)  Verbalizes/displays adequate comfort level or baseline comfort level:   Assess pain using appropriate pain scale   Encourage patient to monitor pain and request assistance  Note: Patient reports headache and \"ovary\" pain during shift assessment. PRN tylenol given as prescribed. Patient reports medication \"helps a little\".      Problem: Risk for Elopement  Goal: Patient will not exit the unit/facility without proper excort  2/28/2025 2147 by Adriane Wood 
as ordered  2. Set limits on impulsive behavior  3. Make attempts to decrease external stimuli as possible  2/28/2025 1105 by Karin Stanton, RN  Outcome: Not Progressing  Note: Continues to appear very impulsive, with rapid speech. Previous shift reports patient sleeping throughout the night. Per charting patient slept 7 1/2 hours broken.  2/28/2025 0202 by Abi Iyer LPN  Outcome: Not Progressing  Note: Hyper verbal, impulsive      Problem: Psychosis  Goal: Will report no hallucinations or delusions  Description: INTERVENTIONS:  1. Administer medication as  ordered  2. Assist with reality testing to support increasing orientation  3. Assess if patient's hallucinations or delusions are encouraging self harm or harm to others and intervene as appropriate  2/28/2025 1105 by Karin Stanton, RN  Outcome: Not Progressing  Note: Patient refused to answer assessment questions regarding hallucinations or delusional thoughts. Does not appear to be responding to internal stimuli. Patient appears to be delusional and is very impulsive.  2/28/2025 0202 by Abi Iyer LPN  Outcome: Progressing    Care plan reviewed with patient.  Patient does not verbalize understanding of the plan of care and does not contribute to goal setting at this time.    
to safety call refer to organization policy.  7. Initiate consult with , Psychosocial CNS, Spiritual Care as appropriate  3/1/2025 2127 by Adriane Wood LPN  Outcome: Progressing  Flowsheets (Taken 3/1/2025 2127)  Patient/family maintains appropriate behavior and adheres to behavioral management agreement, if implemented:   Encourage participation in the decision making process about the behavioral management agreement   Encourage verbalization of thoughts and concerns in a socially appropriate manner  Note: Compliant with nursing tasks and appropriate with staff this shift.   3/1/2025 1424 by Michelle Tavera, RN  Outcome: Not Progressing  Flowsheets (Taken 3/1/2025 0800)  Patient/family maintains appropriate behavior and adheres to behavioral management agreement, if implemented: Assess patient/family’s coping skills and  non-compliant behavior (including use of illegal substances)  Note: Patient has out burst at  during lunchtime visitation. See note.      Problem: Involuntary Admit  Goal: Will cooperate with staff recommendations and doctor's orders and will demonstrate appropriate behavior  Description: INTERVENTIONS:  1. Treat underlying conditions and offer medication as ordered  2. Educate regarding involuntary admission procedures and rules  3. Contain excessive/inappropriate behavior per unit and hospital policies  3/1/2025 2127 by Adriane Wodo LPN  Outcome: Progressing  Flowsheets (Taken 3/1/2025 0800 by Michelle Tavera, RN)  Will cooperate with staff recommendations and doctor's orders and will demonstrate appropriate behavior: Treat underlying conditions and offer medication as ordered  Note: Compliant with medication therapy this shift and cooperative with nursing tasks.   3/1/2025 1424 by Michelle Tavera, RN  Outcome: Progressing  Flowsheets (Taken 3/1/2025 0800)  Will cooperate with staff recommendations and doctor's orders and will demonstrate appropriate behavior: Treat underlying 
consult with , Psychosocial CNS, Spiritual Care as appropriate  Outcome: Not Progressing  Flowsheets (Taken 3/1/2025 0800)  Patient/family maintains appropriate behavior and adheres to behavioral management agreement, if implemented: Assess patient/family’s coping skills and  non-compliant behavior (including use of illegal substances)  Note: Patient has out burst at  during lunchtime visitation. See note.      Problem: Discharge Planning  Goal: Discharge to home or other facility with appropriate resources  Outcome: Not Progressing  Flowsheets (Taken 3/1/2025 0800)  Discharge to home or other facility with appropriate resources: Identify barriers to discharge with patient and caregiver  Note: Patient not discharged this shift. Patient continues to work with care team toward discharge goal.      Problem: Depression  Goal: Will be euthymic at discharge  Description: INTERVENTIONS:  1. Administer medication as ordered  2. Provide emotional support via 1:1 interaction with staff  3. Encourage involvement in milieu/groups/activities  4. Monitor for social isolation  Outcome: Not Progressing  Note: Patient remains delusional. Oriented x3 this shift. Denies all but continues to exhibits symptoms of anxiety this shift.      Problem: Psychosis  Goal: Will report no hallucinations or delusions  Description: INTERVENTIONS:  1. Administer medication as  ordered  2. Assist with reality testing to support increasing orientation  3. Assess if patient's hallucinations or delusions are encouraging self harm or harm to others and intervene as appropriate  Outcome: Not Progressing  Note: No hallucinations noted. Continues to have referential/paranoid delusions.      
day  3/2/2025 2325 by Adriane Wood LPN  Outcome: Progressing  Note: Sleep patterns improving.   3/2/2025 1308 by Michelle Tavera, RN  Outcome: Progressing  Note: 6.5B     
other facility with appropriate resources: Identify barriers to discharge with patient and caregiver  Note: Patient not discharged this shift. Patient continues to work with care team toward discharge goal.   3/2/2025 1143 by Michelle Tavera RN  Outcome: Not Progressing  Flowsheets (Taken 3/2/2025 0800)  Discharge to home or other facility with appropriate resources: Identify barriers to discharge with patient and caregiver  Note: Patient not discharged this shift. Patient contijnues to work with care team toward discharge goal.      Problem: Vannesa  Goal: Will exhibit normal sleep and speech and no impulsivity  Description: INTERVENTIONS:  1. Administer medication as ordered  2. Set limits on impulsive behavior  3. Make attempts to decrease external stimuli as possible  Outcome: Not Progressing  Note: 6.5B. Remains impulsive     Problem: Psychosis  Goal: Will report no hallucinations or delusions  Description: INTERVENTIONS:  1. Administer medication as  ordered  2. Assist with reality testing to support increasing orientation  3. Assess if patient's hallucinations or delusions are encouraging self harm or harm to others and intervene as appropriate  Outcome: Not Progressing  Note: Denies hallucinations. Remains delusional with paranoia, flight of ideas, ideas of reference.

## 2025-03-03 NOTE — PROGRESS NOTES
Group Therapy Note    Date: 3/3/2025  Start Time: 0930  End Time:  1030  Number of Participants: 11    Type of Group: Psychotherapy    Notes:  Patient was present during group. Patient provided appropriate conversation. Group members discussed the topic of self care. Group members identified different forms of self care. Group members discussed what makes self care difficult and reflected on habits that could make self care less difficult. Group members discussed the benefits of self care.    Status After Intervention:  Improved    Participation Level: Active Listener and Interactive    Participation Quality: Appropriate, Attentive, Sharing, and Supportive      Speech:  hesitant      Thought Process/Content: Logical  Linear      Affective Functioning: Congruent      Mood: euthymic      Level of consciousness:  Alert, Oriented x4, and Attentive      Response to Learning: Able to verbalize current knowledge/experience, Able to verbalize/acknowledge new learning, Able to retain information, Capable of insight, Able to change behavior, and Progressing to goal      Endings: None Reported    Modes of Intervention: Education, Support, Socialization, Exploration, Clarifying, and Problem-solving      Discipline Responsible: /Counselor      Signature:  GERONIMO Michel

## 2025-03-03 NOTE — PROGRESS NOTES
Case Management-This clinician called [facility] and asked to speak with Madiha. Madiha was unavailable at the time and stated that she will call back in 30 minutes to schedule an appointment for the patient.

## 2025-03-03 NOTE — PROGRESS NOTES
Behavioral Health   Discharge Note    Pt discharged with followings belongings:   Dental Appliances: None  Vision - Corrective Lenses: None (sunglasses)  Hearing Aid: None  Jewelry: Body Piercing  Body Piercings Removed: No  Clothing: Footwear, Pants, Shirt, Shorts, At bedside  Other Valuables: Money   Valuables retrieved from safe, security envelope number:  [id number] and returned to patient.  Patient left department with  via private car.  Discharged to home. \"An Important Message from Medicare About Your Rights\" (IMM) form photocopy original from admission and provided to pt at least 4 hours prior to discharge N/A. \"An Important Message from [employer] About Your Rights\" (IMM) form photocopy original from admission. N/A. If pt left within 4 hours of receiving 2nd delivery of IMM, this is because pt was agreeable with hospital discharge.  Patient/guardian education on aftercare instructions: Yes  Bridge appointment completed:  yes.  Reviewed Discharge Instructions with patient/family/nursing facility.  Patient/family verbalizes understanding and agreement with the discharge plan using the teachback method.  Patient/family verbalize understanding of AVS:Yes    Status EXAM upon discharge:  Mental Status and Behavioral Exam  Normal: No  Level of Assistance: Independent/Self  Facial Expression: Flat  Affect: Stable  Level of Consciousness: Alert  Frequency of Checks: 4 times per hour, close  Mood:Normal: No  Mood: Sad  Motor Activity:Normal: Yes  Motor Activity: Increased  Eye Contact: Good  Observed Behavior: Cooperative, Preoccupied  Sexual Misconduct History: Current - no  Preception: [place] to person, [place] to time, [place] to place  Attention:Normal: No  Attention: Distractible  Thought Processes: Flight of ideas  Thought Content:Normal: No  Thought Content: Preoccupations  Depression Symptoms: No problems reported or observed.  Anxiety Symptoms: No problems reported or observed.  Vannesa Symptoms:

## 2025-03-03 NOTE — PROGRESS NOTES
Patient experienced large, loose episode of fecal incontinence while sleeping. Patient denied episode when discovered. Attempted to throw-away personal clothing items, despite writer offer to launder items. Writer provided reassurance that the episode is not troubling and that I'm happy to help. During assist, patient asked writer for \"Milk of Mag\". Educated that MOM would worsen loose stools. Administered Maalox per MAR.

## 2025-03-03 NOTE — GROUP NOTE
Group Therapy Note    Date: 3/3/2025    Group Start Time: 1100  Group End Time: 1130  Group Topic: Recreational    [facility]    Lynsey Fermin [facility]        Group Therapy Note    Attendees: 11       Patient's Goal:  To promote social interaction and skill development through music activity.      Notes: Pt. Was an active participant in the music activity that was provided from volunteers. Pt. Participated in music activity by playing the percussive instruments provided. Pt was an active participant in group conversation when discussing the music and the instruments with verbal cues and prompting from writer. Without verbal prompt, patient is an active listener    Status After Intervention:  Improved    Participation Level: Active Listener and Interactive    Participation Quality: Appropriate, Attentive, Sharing, and Supportive      Speech:  normal      Thought Process/Content: Logical  Linear      Affective Functioning: Congruent      Mood: euthymic      Level of consciousness:  Alert, Oriented x4, and Attentive      Response to Learning: Able to verbalize current knowledge/experience, Able to verbalize/acknowledge new learning, Able to retain information, Capable of insight, Able to change behavior, and Progressing to goal      Endings: None Reported    Modes of Intervention: Education, Support, Socialization, Exploration, Clarifying, and Problem-solving      Discipline Responsible: Recreational Therapist      Signature:  NAJMA Matute

## 2025-03-04 NOTE — DISCHARGE SUMMARY
Provider Discharge Summary     Patient ID:  Ana Puente  [id number]  39 y.o.  1985    Admit date: 2/27/2025    Discharge date and time: 3/3/25 7pm    Admitting Physician: Ysabel Romero MD     Discharge Physician: Ysabel Romero MD    Admission Diagnoses: Acute psychosis (HCC) [F23]    Discharge Diagnoses:      Acute psychosis (HCC)     Patient Active Problem List   Diagnosis Code    JESUSITA (generalized anxiety disorder) F41.1    Tobacco abuse Z72.0    Asthma J45.909    Gastroesophageal reflux disease K21.9    Herpes simplex vulvovaginitis A60.04    Controlled substance agreement signed Z79.899    Abdominal pain R10.9    Celiac sprue K90.0    SMAS (superior mesenteric artery syndrome) K55.1    History of CVA (cerebrovascular accident) Z86.73    Severe malnutrition E43    Hypotension I95.9    Low grade fever R50.9    Anxiety F41.9    Starvation ketoacidosis T73.0XXA, E87.29    Malnutrition, unspecified type E46    Intractable abdominal pain R10.9    Acute psychosis (HCC) F23    Acute bacterial conjunctivitis of right eye H10.31        Admission Condition: poor    Discharged Condition: stable    Indication for Admission: threat to self    History of Present Illnes (present tense wording is of findings from admission exam and are not necessarily indicative of current findings):   Ana Puente is a 39 y.o. female with a history of MDD, JESUSITA and alcohol use disorder who was admitted from [place] [facility] due to concerns for psychosis and manic behavior      Patient was seen and evaluated by psychiatric consult services in the [place] [facility]. Per the consult note:  \"Pt was seen through virtual consult. Pt was sitting on cot with street clothes on and monitors hooked up. Pt was engaged in assessment; however, it was difficult to obtain information due to pt disorganized thoughts, and tangential/circumstantial speech. Pt eye contact was appropriate. Pt mood was irritated. Pt did not appear to be responding

## 2025-03-07 ENCOUNTER — TELEPHONE (OUTPATIENT)
Age: 40
End: 2025-03-07

## 2025-03-07 NOTE — TELEPHONE ENCOUNTER
Follow up call to patient to assess if patient had questions related to DC>  No answer at number provided by patient and no voicemail came on to allow a message.

## 2025-07-31 LAB
A/G RATIO: 2 (ref 1.5–2.5)
ALBUMIN: 4.3 G/DL (ref 3.5–5)
ALP BLD-CCNC: 62 IU/L (ref 39–118)
ALT SERPL-CCNC: 19 IU/L (ref 10–40)
AMYLASE: 34 U/L (ref 30–100)
ANION GAP SERPL CALCULATED.3IONS-SCNC: 6 MMOL/L (ref 4–12)
AST SERPL-CCNC: 14 IU/L (ref 15–41)
BASOPHILS ABSOLUTE: 100 /CMM (ref 0–200)
BASOPHILS RELATIVE PERCENT: 0.8 % (ref 0–2)
BILIRUB SERPL-MCNC: 0.3 MG/DL (ref 0.2–1)
BUN BLDV-MCNC: 11 MG/DL (ref 7–20)
CALCIUM SERPL-MCNC: 9.3 MG/DL (ref 8.8–10.5)
CHLORIDE BLD-SCNC: 104 MEQ/L (ref 101–111)
CHOLESTEROL, TOTAL: 98 MG/DL
CHOLESTEROL/HDL RELATIVE RISK: 2.6 (ref 4–4.4)
CO2: 29 MEQ/L (ref 21–32)
CREAT SERPL-MCNC: 0.72 MG/DL (ref 0.6–1.3)
CREATININE CLEARANCE: >60
DIRECT-LDL / HDL RISK: 1.2
EOSINOPHILS ABSOLUTE: 100 /CMM (ref 0–500)
EOSINOPHILS RELATIVE PERCENT: 1.1 % (ref 0–6)
GLUCOSE: 101 MG/DL (ref 70–110)
HCT VFR BLD CALC: 38.8 % (ref 35–44)
HDLC SERPL-MCNC: 37 MG/DL
HEMOGLOBIN: 13.5 GM/DL (ref 12–15)
LDL CHOLESTEROL DIRECT: 48 MG/DL
LIPASE: 22 IU/L (ref 21–51)
LYMPHOCYTES ABSOLUTE: 2000 /CMM (ref 1000–4800)
LYMPHOCYTES RELATIVE PERCENT: 26.7 % (ref 15–45)
MCH RBC QN AUTO: 32.4 PG (ref 27.5–33)
MCHC RBC AUTO-ENTMCNC: 34.9 GM/DL (ref 33–36)
MCV RBC AUTO: 92.7 CU MIC (ref 80–97)
MONOCYTES ABSOLUTE: 600 /CMM (ref 0–800)
MONOCYTES RELATIVE PERCENT: 7.5 % (ref 2–10)
NEUTROPHILS ABSOLUTE: 4700 /CMM (ref 1800–7700)
NEUTROPHILS RELATIVE PERCENT: 63.9 % (ref 40–70)
NUCLEATED RBCS: 0.1 /100 WBC
PDW BLD-RTO: 12.7 % (ref 12–16)
PLATELET # BLD: 300 TH/CMM (ref 150–400)
POTASSIUM SERPL-SCNC: 3.6 MEQ/L (ref 3.6–5)
RBC # BLD: 4.18 MIL/CMM (ref 4–5.1)
SODIUM BLD-SCNC: 139 MEQ/L (ref 135–145)
TOTAL PROTEIN: 6.5 G/DL (ref 6.2–8)
TRIGL SERPL-MCNC: 65 MG/DL
VITAMIN D 25-HYDROXY: 49.9 NG/ML (ref 30–100)
VLDLC SERPL CALC-MCNC: 13 MG/DL
WBC # BLD: 7.3 TH/CMM (ref 4.4–10.5)